# Patient Record
Sex: FEMALE | Race: BLACK OR AFRICAN AMERICAN | NOT HISPANIC OR LATINO | Employment: FULL TIME | URBAN - METROPOLITAN AREA
[De-identification: names, ages, dates, MRNs, and addresses within clinical notes are randomized per-mention and may not be internally consistent; named-entity substitution may affect disease eponyms.]

---

## 2023-02-21 LAB
ALANINE AMINOTRANSFERASE (SGPT) (U/L) IN SER/PLAS: 14 U/L (ref 7–45)
ALBUMIN (G/DL) IN SER/PLAS: 3.9 G/DL (ref 3.4–5)
ALKALINE PHOSPHATASE (U/L) IN SER/PLAS: 39 U/L (ref 33–110)
ANION GAP IN SER/PLAS: 13 MMOL/L (ref 10–20)
ASPARTATE AMINOTRANSFERASE (SGOT) (U/L) IN SER/PLAS: 18 U/L (ref 9–39)
BASOPHILS (10*3/UL) IN BLOOD BY AUTOMATED COUNT: 0.02 X10E9/L (ref 0–0.1)
BASOPHILS/100 LEUKOCYTES IN BLOOD BY AUTOMATED COUNT: 0.3 % (ref 0–2)
BILIRUBIN TOTAL (MG/DL) IN SER/PLAS: 0.5 MG/DL (ref 0–1.2)
CALCIDIOL (25 OH VITAMIN D3) (NG/ML) IN SER/PLAS: 12 NG/ML
CALCIUM (MG/DL) IN SER/PLAS: 9.2 MG/DL (ref 8.6–10.3)
CARBON DIOXIDE, TOTAL (MMOL/L) IN SER/PLAS: 25 MMOL/L (ref 21–32)
CHLORIDE (MMOL/L) IN SER/PLAS: 103 MMOL/L (ref 98–107)
CHOLESTEROL (MG/DL) IN SER/PLAS: 181 MG/DL (ref 0–199)
CHOLESTEROL IN HDL (MG/DL) IN SER/PLAS: 64.8 MG/DL
CHOLESTEROL/HDL RATIO: 2.8
COBALAMIN (VITAMIN B12) (PG/ML) IN SER/PLAS: 275 PG/ML (ref 211–911)
CREATININE (MG/DL) IN SER/PLAS: 0.75 MG/DL (ref 0.5–1.05)
EOSINOPHILS (10*3/UL) IN BLOOD BY AUTOMATED COUNT: 0.02 X10E9/L (ref 0–0.7)
EOSINOPHILS/100 LEUKOCYTES IN BLOOD BY AUTOMATED COUNT: 0.3 % (ref 0–6)
ERYTHROCYTE DISTRIBUTION WIDTH (RATIO) BY AUTOMATED COUNT: 16.9 % (ref 11.5–14.5)
ERYTHROCYTE MEAN CORPUSCULAR HEMOGLOBIN CONCENTRATION (G/DL) BY AUTOMATED: 31.4 G/DL (ref 32–36)
ERYTHROCYTE MEAN CORPUSCULAR VOLUME (FL) BY AUTOMATED COUNT: 82 FL (ref 80–100)
ERYTHROCYTES (10*6/UL) IN BLOOD BY AUTOMATED COUNT: 4.72 X10E12/L (ref 4–5.2)
GFR FEMALE: >90 ML/MIN/1.73M2
GLUCOSE (MG/DL) IN SER/PLAS: 70 MG/DL (ref 74–99)
HEMATOCRIT (%) IN BLOOD BY AUTOMATED COUNT: 38.9 % (ref 36–46)
HEMOGLOBIN (G/DL) IN BLOOD: 12.2 G/DL (ref 12–16)
IMMATURE GRANULOCYTES/100 LEUKOCYTES IN BLOOD BY AUTOMATED COUNT: 0 % (ref 0–0.9)
IRON (UG/DL) IN SER/PLAS: 109 UG/DL (ref 35–150)
IRON BINDING CAPACITY (UG/DL) IN SER/PLAS: 327 UG/DL (ref 240–445)
IRON SATURATION (%) IN SER/PLAS: 33 % (ref 25–45)
LDL: 100 MG/DL (ref 0–99)
LEUKOCYTES (10*3/UL) IN BLOOD BY AUTOMATED COUNT: 5.8 X10E9/L (ref 4.4–11.3)
LYMPHOCYTES (10*3/UL) IN BLOOD BY AUTOMATED COUNT: 1.69 X10E9/L (ref 1.2–4.8)
LYMPHOCYTES/100 LEUKOCYTES IN BLOOD BY AUTOMATED COUNT: 29.1 % (ref 13–44)
MONOCYTES (10*3/UL) IN BLOOD BY AUTOMATED COUNT: 0.39 X10E9/L (ref 0.1–1)
MONOCYTES/100 LEUKOCYTES IN BLOOD BY AUTOMATED COUNT: 6.7 % (ref 2–10)
NEUTROPHILS (10*3/UL) IN BLOOD BY AUTOMATED COUNT: 3.69 X10E9/L (ref 1.2–7.7)
NEUTROPHILS/100 LEUKOCYTES IN BLOOD BY AUTOMATED COUNT: 63.6 % (ref 40–80)
PLATELETS (10*3/UL) IN BLOOD AUTOMATED COUNT: 204 X10E9/L (ref 150–450)
POTASSIUM (MMOL/L) IN SER/PLAS: 3.7 MMOL/L (ref 3.5–5.3)
PROTEIN TOTAL: 7.2 G/DL (ref 6.4–8.2)
SODIUM (MMOL/L) IN SER/PLAS: 137 MMOL/L (ref 136–145)
THYROTROPIN (MIU/L) IN SER/PLAS BY DETECTION LIMIT <= 0.05 MIU/L: 1.01 MIU/L (ref 0.44–3.98)
TRIGLYCERIDE (MG/DL) IN SER/PLAS: 83 MG/DL (ref 0–149)
UREA NITROGEN (MG/DL) IN SER/PLAS: 9 MG/DL (ref 6–23)
VLDL: 17 MG/DL (ref 0–40)

## 2023-02-22 LAB — FERRITIN (UG/LL) IN SER/PLAS: 118 UG/L (ref 8–150)

## 2023-05-05 ENCOUNTER — HOSPITAL ENCOUNTER (OUTPATIENT)
Dept: DATA CONVERSION | Facility: HOSPITAL | Age: 43
End: 2023-05-05
Attending: ANESTHESIOLOGY | Admitting: ANESTHESIOLOGY
Payer: COMMERCIAL

## 2023-05-05 DIAGNOSIS — M25.551 PAIN IN RIGHT HIP: ICD-10-CM

## 2023-05-05 DIAGNOSIS — M16.11 UNILATERAL PRIMARY OSTEOARTHRITIS, RIGHT HIP: ICD-10-CM

## 2023-05-22 DIAGNOSIS — Z00.00 ROUTINE GENERAL MEDICAL EXAMINATION AT A HEALTH CARE FACILITY: Primary | ICD-10-CM

## 2023-05-23 RX ORDER — MELOXICAM 7.5 MG/1
TABLET ORAL
Qty: 60 TABLET | Refills: 3 | Status: SHIPPED | OUTPATIENT
Start: 2023-05-23 | End: 2023-11-07 | Stop reason: WASHOUT

## 2023-06-13 ENCOUNTER — HOSPITAL ENCOUNTER (OUTPATIENT)
Dept: DATA CONVERSION | Facility: HOSPITAL | Age: 43
End: 2023-06-13
Attending: ANESTHESIOLOGY

## 2023-06-15 ENCOUNTER — TELEPHONE (OUTPATIENT)
Dept: PRIMARY CARE | Facility: CLINIC | Age: 43
End: 2023-06-15
Payer: COMMERCIAL

## 2023-06-15 NOTE — TELEPHONE ENCOUNTER
Shoneia has been on an antibiotic for sinus infection.  She now has a yeast infection and is requesting that something be sent in for that.  She would also like a cream or gel as well.  Thank you

## 2023-06-19 DIAGNOSIS — B37.9 CANDIDIASIS: Primary | ICD-10-CM

## 2023-06-19 RX ORDER — FLUCONAZOLE 150 MG/1
150 TABLET ORAL ONCE
Qty: 1 TABLET | Refills: 0 | Status: SHIPPED | OUTPATIENT
Start: 2023-06-19 | End: 2023-12-27

## 2023-07-11 ENCOUNTER — HOSPITAL ENCOUNTER (OUTPATIENT)
Dept: DATA CONVERSION | Facility: HOSPITAL | Age: 43
End: 2023-07-11
Attending: ANESTHESIOLOGY
Payer: COMMERCIAL

## 2023-07-11 DIAGNOSIS — M25.551 PAIN IN RIGHT HIP: ICD-10-CM

## 2023-07-11 DIAGNOSIS — M16.11 UNILATERAL PRIMARY OSTEOARTHRITIS, RIGHT HIP: ICD-10-CM

## 2023-07-20 ENCOUNTER — TELEPHONE (OUTPATIENT)
Dept: PRIMARY CARE | Facility: CLINIC | Age: 43
End: 2023-07-20
Payer: COMMERCIAL

## 2023-07-20 DIAGNOSIS — Z12.31 ENCOUNTER FOR SCREENING MAMMOGRAM FOR MALIGNANT NEOPLASM OF BREAST: ICD-10-CM

## 2023-07-20 DIAGNOSIS — R92.8 ABNORMAL MAMMOGRAM OF RIGHT BREAST: Primary | ICD-10-CM

## 2023-07-20 NOTE — TELEPHONE ENCOUNTER
Shoneia is requesting an order for a mammogram & ultrasound.  She thought that [facility] had already ordered these, but I don't see anything.

## 2023-07-27 ENCOUNTER — APPOINTMENT (OUTPATIENT)
Dept: PRIMARY CARE | Facility: CLINIC | Age: 43
End: 2023-07-27
Payer: COMMERCIAL

## 2023-07-28 ENCOUNTER — APPOINTMENT (OUTPATIENT)
Dept: PRIMARY CARE | Facility: CLINIC | Age: 43
End: 2023-07-28
Payer: COMMERCIAL

## 2023-08-02 ENCOUNTER — OFFICE VISIT (OUTPATIENT)
Dept: PRIMARY CARE | Facility: CLINIC | Age: 43
End: 2023-08-02
Payer: COMMERCIAL

## 2023-08-02 VITALS
WEIGHT: 293 LBS | RESPIRATION RATE: 18 BRPM | OXYGEN SATURATION: 98 % | DIASTOLIC BLOOD PRESSURE: 60 MMHG | HEIGHT: 67 IN | SYSTOLIC BLOOD PRESSURE: 110 MMHG | BODY MASS INDEX: 45.99 KG/M2 | HEART RATE: 66 BPM | TEMPERATURE: 98.4 F

## 2023-08-02 DIAGNOSIS — M16.11 PRIMARY OSTEOARTHRITIS OF RIGHT HIP: Primary | ICD-10-CM

## 2023-08-02 DIAGNOSIS — Z90.710 S/P TAH (TOTAL ABDOMINAL HYSTERECTOMY): ICD-10-CM

## 2023-08-02 DIAGNOSIS — R10.84 GENERALIZED ABDOMINAL PAIN: ICD-10-CM

## 2023-08-02 DIAGNOSIS — R10.32 LEFT LOWER QUADRANT ABDOMINAL PAIN: ICD-10-CM

## 2023-08-02 DIAGNOSIS — E11.9 TYPE 2 DIABETES MELLITUS WITHOUT COMPLICATION, WITHOUT LONG-TERM CURRENT USE OF INSULIN (MULTI): ICD-10-CM

## 2023-08-02 DIAGNOSIS — E55.9 VITAMIN D DEFICIENCY: ICD-10-CM

## 2023-08-02 PROBLEM — M19.90 ARTHRITIS: Status: ACTIVE | Noted: 2023-08-02

## 2023-08-02 PROBLEM — R43.0 ANOSMIA: Status: ACTIVE | Noted: 2023-08-02

## 2023-08-02 PROBLEM — K21.9 GERD (GASTROESOPHAGEAL REFLUX DISEASE): Status: ACTIVE | Noted: 2023-08-02

## 2023-08-02 PROBLEM — I83.893 VARICOSE VEINS OF LEG WITH EDEMA, BILATERAL: Status: ACTIVE | Noted: 2023-08-02

## 2023-08-02 PROBLEM — I80.9 THROMBOPHLEBITIS: Status: ACTIVE | Noted: 2023-08-02

## 2023-08-02 PROBLEM — R53.83 FATIGUE: Status: ACTIVE | Noted: 2023-08-02

## 2023-08-02 PROBLEM — E53.8 VITAMIN B12 DEFICIENCY: Status: ACTIVE | Noted: 2023-08-02

## 2023-08-02 PROBLEM — R06.83 SNORING: Status: ACTIVE | Noted: 2023-08-02

## 2023-08-02 PROBLEM — B37.31 CANDIDAL VULVOVAGINITIS: Status: ACTIVE | Noted: 2023-08-02

## 2023-08-02 PROBLEM — R63.2 POLYPHAGIA: Status: ACTIVE | Noted: 2023-08-02

## 2023-08-02 PROBLEM — L73.2 HIDRADENITIS SUPPURATIVA: Status: ACTIVE | Noted: 2023-08-02

## 2023-08-02 PROBLEM — I10 BENIGN HYPERTENSION: Status: ACTIVE | Noted: 2018-10-30

## 2023-08-02 PROBLEM — E66.01 MORBID OBESITY (MULTI): Status: ACTIVE | Noted: 2023-08-02

## 2023-08-02 PROBLEM — R59.0 CERVICAL LYMPHADENOPATHY: Status: ACTIVE | Noted: 2023-08-02

## 2023-08-02 PROBLEM — G47.30 SLEEP APNEA: Status: ACTIVE | Noted: 2023-08-02

## 2023-08-02 PROBLEM — D64.9 ANEMIA: Status: ACTIVE | Noted: 2023-08-02

## 2023-08-02 PROBLEM — N97.0 ANOVULAR MENSTRUATION: Status: ACTIVE | Noted: 2023-08-02

## 2023-08-02 PROBLEM — N63.10 MASS OF BREAST, RIGHT: Status: ACTIVE | Noted: 2022-08-16

## 2023-08-02 PROBLEM — N92.0 MENORRHAGIA: Status: ACTIVE | Noted: 2023-08-02

## 2023-08-02 PROBLEM — N94.6 DYSMENORRHEA: Status: ACTIVE | Noted: 2023-08-02

## 2023-08-02 PROBLEM — B35.4 TINEA CORPORIS: Status: ACTIVE | Noted: 2023-08-02

## 2023-08-02 LAB
POC APPEARANCE, URINE: ABNORMAL
POC BILIRUBIN, URINE: ABNORMAL
POC BLOOD, URINE: NEGATIVE
POC COLOR, URINE: ABNORMAL
POC GLUCOSE, URINE: NEGATIVE MG/DL
POC KETONES, URINE: ABNORMAL MG/DL
POC LEUKOCYTES, URINE: NEGATIVE
POC NITRITE,URINE: NEGATIVE
POC PH, URINE: 6 PH
POC PROTEIN, URINE: ABNORMAL MG/DL
POC SPECIFIC GRAVITY, URINE: >=1.03
POC UROBILINOGEN, URINE: 0.2 EU/DL

## 2023-08-02 PROCEDURE — 3008F BODY MASS INDEX DOCD: CPT | Performed by: INTERNAL MEDICINE

## 2023-08-02 PROCEDURE — 87086 URINE CULTURE/COLONY COUNT: CPT

## 2023-08-02 PROCEDURE — 81002 URINALYSIS NONAUTO W/O SCOPE: CPT | Performed by: INTERNAL MEDICINE

## 2023-08-02 PROCEDURE — 3074F SYST BP LT 130 MM HG: CPT | Performed by: INTERNAL MEDICINE

## 2023-08-02 PROCEDURE — 1036F TOBACCO NON-USER: CPT | Performed by: INTERNAL MEDICINE

## 2023-08-02 PROCEDURE — 3078F DIAST BP <80 MM HG: CPT | Performed by: INTERNAL MEDICINE

## 2023-08-02 PROCEDURE — 99215 OFFICE O/P EST HI 40 MIN: CPT | Performed by: INTERNAL MEDICINE

## 2023-08-02 RX ORDER — FLUTICASONE PROPIONATE 50 MCG
SPRAY, SUSPENSION (ML) NASAL AS NEEDED
COMMUNITY
Start: 2022-06-30

## 2023-08-02 RX ORDER — CLINDAMYCIN PHOSPHATE 10 UG/ML
LOTION TOPICAL
COMMUNITY
Start: 2022-07-06 | End: 2023-08-02 | Stop reason: ALTCHOICE

## 2023-08-02 RX ORDER — UBIDECARENONE 75 MG
1 CAPSULE ORAL DAILY
COMMUNITY
Start: 2023-02-22 | End: 2024-04-09 | Stop reason: SDUPTHER

## 2023-08-02 RX ORDER — TIZANIDINE HYDROCHLORIDE 4 MG/1
4 CAPSULE, GELATIN COATED ORAL 3 TIMES DAILY PRN
Qty: 30 CAPSULE | Refills: 3 | Status: SHIPPED | OUTPATIENT
Start: 2023-08-02 | End: 2023-08-10

## 2023-08-02 RX ORDER — FLUCONAZOLE 150 MG/1
TABLET ORAL
COMMUNITY
Start: 2023-06-19 | End: 2023-08-02 | Stop reason: ALTCHOICE

## 2023-08-02 RX ORDER — KETOCONAZOLE 20 MG/G
CREAM TOPICAL 2 TIMES DAILY
COMMUNITY
Start: 2022-09-19 | End: 2023-08-02 | Stop reason: ALTCHOICE

## 2023-08-02 RX ORDER — DULOXETIN HYDROCHLORIDE 30 MG/1
CAPSULE, DELAYED RELEASE ORAL
COMMUNITY
Start: 2023-04-24 | End: 2023-08-02 | Stop reason: SDDI

## 2023-08-02 RX ORDER — DULOXETIN HYDROCHLORIDE 60 MG/1
CAPSULE, DELAYED RELEASE ORAL
COMMUNITY
Start: 2023-04-24 | End: 2023-08-02 | Stop reason: SDDI

## 2023-08-02 RX ORDER — FOLIC ACID 1 MG/1
TABLET ORAL
COMMUNITY
Start: 2022-06-23 | End: 2024-04-09 | Stop reason: SDUPTHER

## 2023-08-02 RX ORDER — FERROUS SULFATE 325(65) MG
1 TABLET ORAL DAILY
COMMUNITY
Start: 2021-10-25 | End: 2023-08-02 | Stop reason: SDUPTHER

## 2023-08-02 RX ORDER — TIZANIDINE HYDROCHLORIDE 4 MG/1
CAPSULE, GELATIN COATED ORAL
COMMUNITY
Start: 2022-04-11 | End: 2023-08-02 | Stop reason: SDUPTHER

## 2023-08-02 RX ORDER — LORATADINE 10 MG
1 TABLET,DISINTEGRATING ORAL DAILY
COMMUNITY
Start: 2023-06-11

## 2023-08-02 RX ORDER — FERROUS SULFATE 325(65) MG
1 TABLET ORAL DAILY
Qty: 90 TABLET | Refills: 3 | Status: SHIPPED | OUTPATIENT
Start: 2023-08-02 | End: 2024-04-09 | Stop reason: SDUPTHER

## 2023-08-02 RX ORDER — ERGOCALCIFEROL 1.25 MG/1
CAPSULE ORAL
COMMUNITY
Start: 2021-07-28 | End: 2023-08-02 | Stop reason: ALTCHOICE

## 2023-08-02 ASSESSMENT — PAIN SCALES - GENERAL: PAINLEVEL: 10-WORST PAIN EVER

## 2023-08-02 NOTE — LETTER
August 2, 2023     Patient: Shoneia M Collins   YOB: 1980   Date of Visit: 8/2/2023       To Whom It May Concern:    Shoneia Collins was seen in my clinic on 8/2/2023 at 2:40 pm. Please excuse Shoneia for her absence from work on this day to make the appointment and until 8/5/2023.    If you have any questions or concerns, please don't hesitate to call.         Sincerely,         Goran Triplett,         CC: No Recipients

## 2023-08-02 NOTE — PROGRESS NOTES
"Subjective   Shoneia M Collins is a 43 y.o. female who presents for Follow-up (Medications ), Establish Care (Previous pcp was [facility] ), Constipation, and Abdominal Pain.      Appointment made to get tizanidine refilled.  Since Friday has had trouble with eating and sleeping.  All day Saturday laid in bed.  Urinating frequently and has had incontinence when standing up.  No burning with urination.      Sore to the touch and feels like someone is pushing and jabbing there.   Relief with pressure to the LLQ.  Has had this 2 other times before and thought it was associated with vaginal intercourse.  Has had anal sex but not associated with it this time.  Did not do the enema.      Patient states that she hasn't been able to eat, only that she can keep down is fruit, water and tea anything else she will throw up   Gets a little relief when she is able to make a bowel movement, but her bowel movements are very small, states that its like a \" pebble\" that comes out  Patient states that this has happened before and she has always associated it to intercourse but this time it has lasted longer then previous times   Patient has no gallbladder   Patient states that her lower abdomen is really hard and feels like \" someone is twisting her insides\"   Feeling very weak and fatigue- has been taking her muscle relaxer's to help her sleep through the pain     Was recently treated for bacterial vaginosis and a recent sinusitis.  Amoxil given.        Abdominal Pain  This is a new problem. The current episode started in the past 7 days. The onset quality is sudden. The problem occurs constantly. The problem has been gradually worsening. The pain is located in the generalized abdominal region. The pain is at a severity of 10/10. The pain is moderate. The quality of the pain is sharp. The pain is aggravated by eating and vomiting. The pain is relieved by Bowel movements. She has tried nothing for the symptoms. The treatment " "provided no relief.   Fatigue  This is a new problem. The current episode started in the past 7 days. The problem occurs constantly. The problem has been unchanged.       Review of Systems   All other systems reviewed and are negative.      Objective   /60   Pulse 66   Temp 36.9 °C (98.4 °F)   Resp 18   Ht 1.702 m (5' 7\")   Wt (!) 151 kg (332 lb 12.8 oz)   LMP 10/07/2021   SpO2 98%   BMI 52.12 kg/m²       Physical Exam    Assessment/Plan   Problem List Items Addressed This Visit       Diabetes mellitus (CMS/[facility])    Osteoarthritis of right hip - Primary    Relevant Medications    ferrous sulfate 325 (65 Fe) MG tablet    tiZANidine (Zanaflex) 4 mg capsule    Vitamin D deficiency    Relevant Orders    Vitamin D 25-Hydroxy,Total    S/P GAVIN (total abdominal hysterectomy)    Relevant Medications    ferrous sulfate 325 (65 Fe) MG tablet    tiZANidine (Zanaflex) 4 mg capsule    Other Relevant Orders    CBC and Auto Differential    Vitamin D 25-Hydroxy,Total    CT abdomen pelvis w IV contrast    Comprehensive Metabolic Panel    Left lower quadrant abdominal pain    Relevant Medications    ferrous sulfate 325 (65 Fe) MG tablet    tiZANidine (Zanaflex) 4 mg capsule    Other Relevant Orders    CBC and Auto Differential    Vitamin D 25-Hydroxy,Total    CT abdomen pelvis w IV contrast    Comprehensive Metabolic Panel     Other Visit Diagnoses       Generalized abdominal pain        Relevant Medications    ferrous sulfate 325 (65 Fe) MG tablet    tiZANidine (Zanaflex) 4 mg capsule    Other Relevant Orders    POCT UA (nonautomated) manually resulted (Completed)    Urine Culture          Encounter Diagnoses   Name Primary?    Generalized abdominal pain     Primary osteoarthritis of right hip Yes    Left lower quadrant abdominal pain     S/P GAIVN (total abdominal hysterectomy)     Vitamin D deficiency     Type 2 diabetes mellitus without complication, without long-term current use of insulin (CMS/[facility])      Patient with " LLQ ain and discomfort and constipation.  She has had some chils but is afebrile at this time.  Suspect Diverticulitis and will order labs and CT abdomen STAT and she will call after CT for further orders.    Follow up in 24 to 48 hours.      Goran Triplett DO

## 2023-08-03 LAB — URINE CULTURE: NORMAL

## 2023-08-04 ENCOUNTER — TELEPHONE (OUTPATIENT)
Dept: PRIMARY CARE | Facility: CLINIC | Age: 43
End: 2023-08-04
Payer: COMMERCIAL

## 2023-08-04 DIAGNOSIS — K57.33 DIVERTICULITIS OF LARGE INTESTINE WITHOUT PERFORATION OR ABSCESS WITH BLEEDING: Primary | ICD-10-CM

## 2023-08-04 RX ORDER — AMOXICILLIN AND CLAVULANATE POTASSIUM 875; 125 MG/1; MG/1
1 TABLET, FILM COATED ORAL 2 TIMES DAILY
Qty: 14 TABLET | Refills: 0 | Status: SHIPPED | OUTPATIENT
Start: 2023-08-04 | End: 2023-08-11

## 2023-08-04 NOTE — TELEPHONE ENCOUNTER
Shoneia called and would like someone to call her and review the results of her stat labs and CT with her.

## 2023-08-10 ENCOUNTER — TELEPHONE (OUTPATIENT)
Dept: PRIMARY CARE | Facility: CLINIC | Age: 43
End: 2023-08-10
Payer: COMMERCIAL

## 2023-08-10 DIAGNOSIS — M62.838 MUSCLE SPASM: ICD-10-CM

## 2023-08-10 NOTE — TELEPHONE ENCOUNTER
Patient has been having issues getting her tizanidine covered by insurance. The pharmacy suggested switching it from capsules to tablets and it's likely the insurance would cover it that way. So she would like to know if that could be done. ALSO she wants to know if a nutritionist referral can be put in for her.

## 2023-08-15 DIAGNOSIS — M62.838 MUSCLE SPASM: ICD-10-CM

## 2023-08-15 RX ORDER — TIZANIDINE 4 MG/1
TABLET ORAL
Qty: 30 TABLET | Refills: 3 | Status: SHIPPED | OUTPATIENT
Start: 2023-08-15 | End: 2023-11-14 | Stop reason: WASHOUT

## 2023-08-15 RX ORDER — TIZANIDINE 4 MG/1
TABLET ORAL
Qty: 90 TABLET | Refills: 0 | OUTPATIENT
Start: 2023-08-15

## 2023-08-22 PROBLEM — R73.9 HYPERGLYCEMIA: Status: ACTIVE | Noted: 2023-08-22

## 2023-08-22 PROBLEM — M75.52 SUBACROMIAL BURSITIS OF LEFT SHOULDER JOINT: Status: ACTIVE | Noted: 2023-08-22

## 2023-08-22 PROBLEM — J35.1 LINGUAL TONSIL HYPERTROPHY: Status: ACTIVE | Noted: 2023-08-22

## 2023-08-22 PROBLEM — M25.512 CHRONIC LEFT SHOULDER PAIN: Status: ACTIVE | Noted: 2023-08-22

## 2023-08-22 PROBLEM — M54.9 BACK PAIN: Status: ACTIVE | Noted: 2023-08-22

## 2023-08-22 PROBLEM — R43.9 SMELL AND TASTE DISORDER: Status: ACTIVE | Noted: 2023-08-22

## 2023-08-22 PROBLEM — I83.892 HEMORRHAGE OF VARICOSE VEINS OF LEFT LOWER EXTREMITY: Status: ACTIVE | Noted: 2023-08-22

## 2023-08-22 PROBLEM — I83.819 VARICOSE VEINS WITH PAIN: Status: ACTIVE | Noted: 2023-08-22

## 2023-08-22 PROBLEM — G89.29 CHRONIC LEFT SHOULDER PAIN: Status: ACTIVE | Noted: 2023-08-22

## 2023-08-22 RX ORDER — CALCIUM CARBONATE 200(500)MG
1 TABLET,CHEWABLE ORAL DAILY
COMMUNITY
End: 2023-11-07 | Stop reason: WASHOUT

## 2023-08-22 RX ORDER — DULOXETIN HYDROCHLORIDE 30 MG/1
30 CAPSULE, DELAYED RELEASE ORAL
COMMUNITY
End: 2023-11-07 | Stop reason: WASHOUT

## 2023-08-22 RX ORDER — CLINDAMYCIN PHOSPHATE 10 UG/ML
LOTION TOPICAL AS NEEDED
COMMUNITY

## 2023-08-22 RX ORDER — DULOXETIN HYDROCHLORIDE 60 MG/1
60 CAPSULE, DELAYED RELEASE ORAL EVERY EVENING
COMMUNITY
End: 2024-04-09 | Stop reason: ALTCHOICE

## 2023-08-22 RX ORDER — KETOCONAZOLE 20 MG/G
CREAM TOPICAL AS NEEDED
COMMUNITY

## 2023-08-22 RX ORDER — CHLORHEXIDINE GLUCONATE 40 MG/ML
SOLUTION TOPICAL
COMMUNITY
End: 2023-12-11 | Stop reason: ALTCHOICE

## 2023-08-22 RX ORDER — ACETAMINOPHEN 500 MG
500 TABLET ORAL EVERY 8 HOURS PRN
COMMUNITY
End: 2024-04-09

## 2023-10-02 ENCOUNTER — APPOINTMENT (OUTPATIENT)
Dept: SURGERY | Facility: CLINIC | Age: 43
End: 2023-10-02
Payer: COMMERCIAL

## 2023-10-02 NOTE — OP NOTE
Post Operative Note:     PreOp Diagnosis: Right hip osteoarthritis   Post-Procedure Diagnosis: Right hip osteoarthritis   Procedure: 1.  Right hip intra-articular corticosteroid  injection under fluoroscopic guidance   2.  moderate sedation   Surgeon: Jami Chinchilla MD PhD   Resident/Fellow/Other Assistant: Yehuda Castillo DO   Estimated Blood Loss (mL): none   Specimen: no   Findings: None     Operative Report Dictated:  Dictation: not applicable - note contains Operative  Report   Operative Report:    Patient is a pleasant 43-year-old female who has severe right hip osteoarthritis, she is currently awaiting total joint replacement when she is medically optimized  and added healthy weight to have the surgery performed.  In the interim we have offered intra-articular corticosteroid injection under fluoroscopic guidance as well as possible consideration of referral and obturator nerve block if we do not see significant  lasting efficacy from this injection.    After informed consent was obtained, the patient was brought to the operating   room and placed in the left lateral decubitus position (procedure side up).  The skin and subcutaneous tissue overlying needle trajectory was anesthetized with 0.5% lidocaine under fluoroscopic guidance.  A 23-gauge spinal needle was then introduced into  the hip joint.  Injection of contrast revealed appropriate spread.  Subsequently 3mL of 0.5% ropivacaine and 40 mg methylprednisolone were injected.  The patient was then transferred to the recovery room in stable condition.       FOLLOW UP:  The patient will update us on their response to this procedure, and agrees to continue currently prescribed/recommended therapies       Attestation:   Note Completion:  I am a: Resident/Fellow   Attending Attestation I was present for the entire procedure          Electronic Signatures:  Jami Chinchilla)  (Signed 05-May-2023 12:07)   Authored: Post Operative Note, Note  Completion   Co-Signer: Post Operative Note, Note Completion  Yehuda Castillo (DO (Resident))  (Signed 05-May-2023 12:02)   Authored: Post Operative Note, Note Completion      Last Updated: 05-May-2023 12:07 by Jami Chinchilla)

## 2023-10-02 NOTE — OP NOTE
Post Operative Note:     PreOp Diagnosis: right hip osteoarthritis   Post-Procedure Diagnosis: right hip osteoarthritis   Procedure: 1. RIGHT HIP INTRAARTICULAR JOINT INJECTION  2. Fluoroscopic guidance   Surgeon: Jami Chinchilla MD PHD   Resident/Fellow/Other Assistant: Jeff Vences DO   Estimated Blood Loss (mL): none   Specimen: no   Findings: none     Operative Report Dictated:  Dictation: not applicable - note contains Operative  Report   Operative Report:    Ms. Shoeneia Collins is a 43-year-old female with past medical history of osteoarthritis of the hips who was seen in the pain clinic recently.  She presents for a  right-sided intra-articular hip injection.  Should she not receive pain relief with this she may benefit from a femoral and obturator nerve block of the right hip.    After informed consent was obtained, patient was brought to the operating room and placed in the left lateral position with the right side superior. a time out procedure was performed. The right hip area was prepped and draped with chlorhexidine in the  usual sterile fashion.    The appropriate entry point on the right hip was identified with fluoroscopic guidance.  Following this the skin and subcutaneous tissue was anesthetized with 2 mL of 0.5% lidocaine.  A 22-gauge Chiba needle was then inserted and a coaxial technique and  a lateral view to the desired area.  Following this confirmation of placement was confirmed with 1 mL of radiopaque dye in a AP view.  3 mL of half percent ropivacaine and 1 mL (40 mg) methylprednisolone was injected after negative aspiration.  At the  end of the procedure, the needles were removed.  Patient was transferred to recovery room in stable condition.     Given the patient's relatively young age, she would benefit from diagnostic injections of the sensory branches of the femoral and obturator nerves to the right hip and if she obtains adequate relief with the diagnostic injections, she may be  scheduled  for radiofrequency neurotomy in the future as these procedures are steroid free.      Attestation:   Note Completion:  I am a: Resident/Fellow   Attending Attestation I was present for the entire procedure          Electronic Signatures:  Jeff Vences ( (Resident))  (Signed 11-Jul-2023 15:41)   Authored: Post Operative Note, Note Completion  Jami Chinchilla)  (Signed 11-Jul-2023 15:44)   Authored: Post Operative Note, Note Completion   Co-Signer: Post Operative Note, Note Completion      Last Updated: 11-Jul-2023 15:44 by Jami Chinchilla)

## 2023-10-06 ENCOUNTER — OFFICE VISIT (OUTPATIENT)
Dept: SURGERY | Facility: HOSPITAL | Age: 43
End: 2023-10-06
Payer: COMMERCIAL

## 2023-10-06 VITALS
TEMPERATURE: 96.7 F | BODY MASS INDEX: 45.99 KG/M2 | WEIGHT: 293 LBS | HEIGHT: 67 IN | DIASTOLIC BLOOD PRESSURE: 85 MMHG | SYSTOLIC BLOOD PRESSURE: 141 MMHG | RESPIRATION RATE: 18 BRPM | HEART RATE: 64 BPM

## 2023-10-06 DIAGNOSIS — E63.9 INADEQUATE DIETARY CALORIC INTAKE: ICD-10-CM

## 2023-10-06 DIAGNOSIS — E11.9 TYPE 2 DIABETES MELLITUS WITHOUT COMPLICATION, WITHOUT LONG-TERM CURRENT USE OF INSULIN (MULTI): ICD-10-CM

## 2023-10-06 DIAGNOSIS — Z98.84 BARIATRIC SURGERY STATUS: ICD-10-CM

## 2023-10-06 DIAGNOSIS — E66.01 CLASS 3 SEVERE OBESITY DUE TO EXCESS CALORIES WITH SERIOUS COMORBIDITY AND BODY MASS INDEX (BMI) OF 50.0 TO 59.9 IN ADULT (MULTI): Primary | ICD-10-CM

## 2023-10-06 DIAGNOSIS — E63.9 INADEQUATE CALORIC INTAKE: ICD-10-CM

## 2023-10-06 PROCEDURE — 3008F BODY MASS INDEX DOCD: CPT

## 2023-10-06 PROCEDURE — 99204 OFFICE O/P NEW MOD 45 MIN: CPT

## 2023-10-06 PROCEDURE — 3079F DIAST BP 80-89 MM HG: CPT

## 2023-10-06 PROCEDURE — 3077F SYST BP >= 140 MM HG: CPT

## 2023-10-06 PROCEDURE — 99214 OFFICE O/P EST MOD 30 MIN: CPT

## 2023-10-06 PROCEDURE — 1036F TOBACCO NON-USER: CPT

## 2023-10-06 SDOH — ECONOMIC STABILITY: FOOD INSECURITY: WITHIN THE PAST 12 MONTHS, THE FOOD YOU BOUGHT JUST DIDN'T LAST AND YOU DIDN'T HAVE MONEY TO GET MORE.: NEVER TRUE

## 2023-10-06 SDOH — ECONOMIC STABILITY: FOOD INSECURITY: WITHIN THE PAST 12 MONTHS, YOU WORRIED THAT YOUR FOOD WOULD RUN OUT BEFORE YOU GOT MONEY TO BUY MORE.: NEVER TRUE

## 2023-10-06 ASSESSMENT — LIFESTYLE VARIABLES
AUDIT-C TOTAL SCORE: 6
HOW OFTEN DO YOU HAVE SIX OR MORE DRINKS ON ONE OCCASION: MONTHLY
HOW MANY STANDARD DRINKS CONTAINING ALCOHOL DO YOU HAVE ON A TYPICAL DAY: 1 OR 2
SKIP TO QUESTIONS 9-10: 0
HOW OFTEN DO YOU HAVE A DRINK CONTAINING ALCOHOL: 4 OR MORE TIMES A WEEK

## 2023-10-06 ASSESSMENT — SOCIAL DETERMINANTS OF HEALTH (SDOH)

## 2023-10-06 ASSESSMENT — ENCOUNTER SYMPTOMS
ARTHRALGIAS: 1
LOSS OF SENSATION IN FEET: 0
OCCASIONAL FEELINGS OF UNSTEADINESS: 1
DEPRESSION: 0

## 2023-10-06 ASSESSMENT — PATIENT HEALTH QUESTIONNAIRE - PHQ9
SUM OF ALL RESPONSES TO PHQ9 QUESTIONS 1 & 2: 0
2. FEELING DOWN, DEPRESSED OR HOPELESS: NOT AT ALL
1. LITTLE INTEREST OR PLEASURE IN DOING THINGS: NOT AT ALL

## 2023-10-06 ASSESSMENT — PAIN SCALES - GENERAL: PAINLEVEL: 8

## 2023-10-06 NOTE — PATIENT INSTRUCTIONS
"It was great to meet with you today!    Next steps include:    Please go to: [email or web address] - click on the link \"get started\" - watch the video and complete the patient information form on the website.    Once our navigators have completed your insurance verification, they will reach out to you directly to schedule your initial appointments with one of our bariatric surgeons and bariatric dietitians.    Per insurance guidelines all patients will need:   1. Copy of Sleep Study &/or a CPAP Compliance Report.  2. Cardiology Clearance; referral placed if needed.  3. Psychiatric Evaluation/Clearance; referral placed if needed.  4. Initial Bariatric Lab Work; requisition given today.  You will be contacted regarding abnormal results, results available to view on  Portal.   Other clearances may be needed depending on your specific medical conditions and history.     I have placed orders for your initial screening lab work which can be completed at any outpatient  lab facility.    I have also placed a sleep medicine evaluation which can be scheduled by calling [phone].    Here are some additional tips to help you prepare and begin to implement some of the recommended diet and exercise modifications to help you with successful weight loss long term:     Keep a food journal and log everything you eat and drink. You can use a phone applications like \"MyfitnessPal,\" \"Lose it\", \"Baritastic\" or a notebook.   Eat between 0735-3056 calories per day.    Consume less than 100 carbohydrates per day. Examples of carbohydrates include: bread, pasta, cakes, cookies, rice, cereal, fruit drinks, juice, soda and fruit.  This typically means keeping portion sizes of starchy foods as listed above to less than ½ cup serving size with meals and choosing snacks that are less than 15g of carbohydrate per serving.  If you use one of the suggested apps above, you can keep track of this by weighing/measuring portion sizes " to help keep within this goal.    Eat 3 meals and 1 snack. Each meal should have 3-4 oz of protein and vegetables. Limit starches.  Eat on a schedule and do not skip meals.  Eat more vegetables throughout your day; aim for 5+ servings daily.  When you want something sweet, have fruit but no more than 1 serving per day.   Eliminate any pop, juice or added sugar beverages, if you haven't already, from your diet.  Start taking a multivitamin daily- Try Flintstones complete.    Exercise Recommendations:   Aim for 30 minutes per day, 5 days per week to start, with progression over several weeks to more vigorous intensity.   Emphasize increasing duration, rather than intensity initially.   Moderate-intensity physical activity includes:  - Brisk Walking  - Biking (slower than 10 MPH)  - Water Aerobics  - Vigorous housework (washing windows, vacuuming, mopping)  - Mowing the lawn (push mower)  - Gardening      Take care!  Mery Aguliar, MSN, APRN, AGNP-C

## 2023-10-06 NOTE — H&P
History Of Present Illness  Alicja Trimble is a 43 y.o. female presenting with CWM.    Initial Weight: 341 lbs  Initial BMI:  Highest Weight: 474 lbs. Alicja has lost > 130 on her own within 1 year by adjusting her diet   Lowest weight:      Past Medical History  Past Medical History:   Diagnosis Date    Gestational diabetes mellitus in pregnancy, unspecified control 2017    Gestational diabetes mellitus (GDM) in second trimester, gestational diabetes method of control unspecified    Hypertrophy of tonsils with hypertrophy of adenoids 2014    Tonsillar and adenoid hypertrophy    Personal history of gestational diabetes 2017    History of gestational diabetes mellitus (GDM)    Snoring 11/10/2014    Snoring       Surgical History  Past Surgical History:   Procedure Laterality Date     SECTION, CLASSIC  11/10/2014     Section    GALLBLADDER SURGERY  11/10/2014    Gallbladder Surgery    OTHER SURGICAL HISTORY  2021    Laparoscopic hysterectomy    OTHER SURGICAL HISTORY  2021    Bilateral salpingectomy    OTHER SURGICAL HISTORY  2021    Cystoscopy    TUBAL LIGATION  2017    Tubal Ligation        Social History  She reports that she quit smoking about 20 years ago. Her smoking use included cigarettes. She has never used smokeless tobacco. She reports current alcohol use. She reports current drug use. Drug: Marijuana.    Family History  Family History   Problem Relation Name Age of Onset    Lung disease Mother      Diabetes Paternal Grandmother      Diabetes Other          Allergies  Pollen extracts      Review of Systems   Musculoskeletal:  Positive for arthralgias (left hip).          Physical Exam  General- No acute distress, well appearing and well nourished. Obese  HEENT - no erythema, swelling or discharge. Neck supple, no cervical lymphadenopathy.   Pulmonary - respiratory effort normal, lungs CTAB.   Cardiovascular - HRR, no m/r/g  Extremities - no edema  "and/or varicosities, no peripheral edema  Abdomen - Soft, Non-tender, non-distended, no abdominal masses.   Musculoskeletal - Range of motion WNL  Skin - normal without rashes or lesions.  Neurologic - reflexes intact, coordination WNL, gait WNL  Psychiatric - AXO x3, mood and affect appropriate       Last Recorded Vitals  Blood pressure 141/85, pulse 64, temperature 35.9 °C (96.7 °F), resp. rate 18, height 1.702 m (5' 7\"), weight (!) 155 kg (341 lb 1.6 oz), last menstrual period 10/07/2021.    Relevant Results          Current Outpatient Medications:     acetaminophen (Tylenol) 500 mg tablet, Take 1 tablet (500 mg) by mouth every 6 hours if needed for mild pain (1 - 3)., Disp: , Rfl:     calcium carbonate (Tums) 200 mg calcium chewable tablet, Chew 1 tablet (500 mg) once daily., Disp: , Rfl:     chlorhexidine (Hibiclens) 4 % external liquid, Apply topically., Disp: , Rfl:     clindamycin (Cleocin T) 1 % lotion, Apply topically., Disp: , Rfl:     cyanocobalamin (Vitamin B-12) 500 mcg tablet, Take 1 tablet (500 mcg) by mouth once daily., Disp: , Rfl:     ferrous sulfate 325 (65 Fe) MG tablet, Take 1 tablet (325 mg) by mouth once daily., Disp: 90 tablet, Rfl: 3    fluticasone (Flonase) 50 mcg/actuation nasal spray, Administer into affected nostril(s)., Disp: , Rfl:     folic acid (Folvite) 1 mg tablet, Take by mouth., Disp: , Rfl:     ketoconazole (NIZOral) 2 % cream, Apply topically 2 times a day., Disp: , Rfl:     loratadine (Claritin Reditabs) 10 mg disintegrating tablet, Take 1 tablet (10 mg) by mouth once daily., Disp: , Rfl:     meloxicam (Mobic) 7.5 mg tablet, TAKE ONE (1) OR TWO (2) TABLETS BY MOUTH AS NEEDED ONCE DAILY, Disp: 60 tablet, Rfl: 3    tiZANidine (Zanaflex) 4 mg tablet, Take 1 tablet three times daily as needed for muscle spasms, Disp: 30 tablet, Rfl: 3    DULoxetine (Cymbalta) 30 mg DR capsule, Take 1 capsule (30 mg) by mouth 1 (one) time per week. Do not crush or chew., Disp: , Rfl:     " DULoxetine (Cymbalta) 60 mg DR capsule, Take 1 capsule (60 mg) by mouth once daily in the evening. Do not crush or chew., Disp: , Rfl:     famotidine (Pepcid) 40 mg tablet, Take 1 tablet (40 mg) by mouth once daily., Disp: 19 tablet, Rfl: 0      Assessment/Plan   Diagnoses and all orders for this visit:  Inadequate caloric intake  Inadequate dietary caloric intake  Class 3 severe obesity due to excess calories without serious comorbidity with body mass index (BMI) of 50.0 to 59.9 in adult (CMS/[facility])  Bariatric surgery status  -     Referral to Gastroenterology; Future  -     C-Peptide; Future  -     Drug Screen, Urine With Reflex to Confirmation; Future  -     H. Pylori Breath Test; Future  -     Hemoglobin A1C; Future  -     Urinalysis with Reflex Microscopic and Culture; Future  -     Nicotine + Metabolites, Urine; Future  -     CBC and Auto Differential; Future  -     Ferritin; Future  -     Iron and TIBC; Future  -     Comprehensive Metabolic Panel; Future  -     Parathyroid Hormone, Intact; Future  -     Folate; Future  -     Vitamin B12; Future  -     Vitamin B1, Whole Blood; Future  -     Vitamin B6; Future  -     Vitamin A; Future  -     Vitamin K; Future  -     Vitamin E; Future  -     TSH with reflex to Free T4 if abnormal; Future  -     Lipid Panel; Future  -     Coagulation Screen; Future  -     Referral to Adult Sleep Medicine; Future  -     Referral to Pulmonology; Future  -     Referral to Cardiology; Future  -     Referral to Psychology; Future  -     In-Center Sleep Study (Non-Sleep Provider Only)    - Initiate process for bariatric surgery  - Counseling on dietary modifications to support weight loss: Recommend calorie reduced diet through an emphasis on high quality protein, avoidance of processed carbohydrates and added sugars in favor of high fiber whole food carbohydrate sources and appropriately portioned heart healthy fat source.  Encourage elimination of all dietary added sugar beverages.   Dietary resource handout provided.  - Initiate exercise program, start with light aerobic activity in short term intervals until gradually able to increase intensity and duration to goal > 250 minutes weekly  - Obtain baseline labs  - Refer to sleep medicine for MUMTAZ evaluation, cardiology for presurg clearance & psychology  - Establish with outpatient nutrition for dietary counseling until transition over to bariatric nutrition team     Follow up with bariatric surgeon.     I spent 60 minutes in the professional and overall care of this patient.      Mery Aguilar, TORRES-CNP

## 2023-10-07 PROBLEM — E63.9 INADEQUATE CALORIC INTAKE: Status: ACTIVE | Noted: 2023-10-07

## 2023-10-07 PROBLEM — Z98.84 BARIATRIC SURGERY STATUS: Status: ACTIVE | Noted: 2023-10-07

## 2023-10-07 PROBLEM — E66.813 CLASS 3 SEVERE OBESITY DUE TO EXCESS CALORIES WITHOUT SERIOUS COMORBIDITY WITH BODY MASS INDEX (BMI) OF 50.0 TO 59.9 IN ADULT: Status: ACTIVE | Noted: 2023-08-02

## 2023-10-07 PROBLEM — E63.9 INADEQUATE DIETARY CALORIC INTAKE: Status: ACTIVE | Noted: 2023-10-07

## 2023-10-16 NOTE — PROGRESS NOTES
Subjective     Patient ID: Alicja Trimble is a 43 y.o. female who presents for New Patient Visit (Visit for weight management.).    HPI      History Of Present Illness  Alicja Trimble is a 43 y.o. female presenting with CWM.     Initial Weight: 341 lbs  Initial BMI:  Highest Weight: 474 lbs. Alicja has lost > 130 on her own within 1 year by adjusting her diet   Lowest weight:           Past Medical History:   Diagnosis Date    Gestational diabetes mellitus in pregnancy, unspecified control 2017    Gestational diabetes mellitus (GDM) in second trimester, gestational diabetes method of control unspecified    Hypertrophy of tonsils with hypertrophy of adenoids 2014    Tonsillar and adenoid hypertrophy    Personal history of gestational diabetes 2017    History of gestational diabetes mellitus (GDM)    Snoring 11/10/2014    Snoring      Past Surgical History:   Procedure Laterality Date     SECTION, CLASSIC  11/10/2014     Section    GALLBLADDER SURGERY  11/10/2014    Gallbladder Surgery    OTHER SURGICAL HISTORY  2021    Laparoscopic hysterectomy    OTHER SURGICAL HISTORY  2021    Bilateral salpingectomy    OTHER SURGICAL HISTORY  2021    Cystoscopy    TUBAL LIGATION  2017    Tubal Ligation      Family History   Problem Relation Name Age of Onset    Lung disease Mother      Diabetes Paternal Grandmother      Diabetes Other        Social History     Tobacco Use   Smoking Status Former    Types: Cigarettes    Quit date:     Years since quittin.8   Smokeless Tobacco Never      Social History     Substance and Sexual Activity   Drug Use Yes    Types: Marijuana      Social History     Substance and Sexual Activity   Alcohol Use Yes    Comment: occassionally      Social History  She reports that she quit smoking about 20 years ago. Her smoking use included cigarettes. She has never used smokeless tobacco. She reports current alcohol use. She reports  "current drug use. Drug: Marijuana.    Allergies  Allergies   Allergen Reactions    Pollen Extracts Runny nose          VITALS  Visit Vitals  /85   Pulse 64   Temp 35.9 °C (96.7 °F)   Resp 18   Ht 1.702 m (5' 7\")   Wt (!) 155 kg (341 lb 1.6 oz)   LMP 10/07/2021   BMI 53.42 kg/m²   OB Status Hysterectomy   Smoking Status Former   BSA 2.71 m²        LABS  Lab Results   Component Value Date/Time    RNYRRMGM96 275 02/21/2023 1202    TSH 1.01 02/21/2023 1202    FOLATE 3.7 (A) 06/23/2022 0910    VITD25 37 08/03/2023 0830    TIBC 327 02/21/2023 1202    IRON 109 02/21/2023 1202    FERRITIN 118 02/21/2023 1202       ROS  Review of Systems  Musculoskeletal:  Positive for arthralgias (left hip).    Rest of the systems are negative       PHYSICAL EXAM  Physical Exam  General- No acute distress, well appearing and well nourished. Obese  HEENT - no erythema, swelling or discharge. Neck supple, no cervical lymphadenopathy.   Pulmonary - respiratory effort normal, lungs CTAB.   Cardiovascular - HRR, no m/r/g  Extremities - no edema and/or varicosities, no peripheral edema  Abdomen - Soft, Non-tender, non-distended, no abdominal masses.   Musculoskeletal - Range of motion WNL  Skin - normal without rashes or lesions.  Neurologic - reflexes intact, coordination WNL, gait WNL  Psychiatric - AXO x3, mood and affect appropriate      ASSESSMENT/PLAN  Patient here for medical weight loss.    Assessment/Plan   Problem List Items Addressed This Visit       Diabetes mellitus (CMS/HCC)    Class 3 severe obesity due to excess calories without serious comorbidity with body mass index (BMI) of 50.0 to 59.9 in adult (CMS/HCC) - Primary    Inadequate caloric intake    Inadequate dietary caloric intake    Bariatric surgery status    Relevant Orders    Referral to Gastroenterology    C-Peptide    Drug Screen, Urine With Reflex to Confirmation    H. Pylori Breath Test    Hemoglobin A1C    Urinalysis with Reflex Microscopic and Culture    " Nicotine + Metabolites, Urine    CBC and Auto Differential    Ferritin    Iron and TIBC    Comprehensive Metabolic Panel    Parathyroid Hormone, Intact    Folate    Vitamin B12    Vitamin B1, Whole Blood    Vitamin B6    Vitamin A    Vitamin K    Vitamin E    TSH with reflex to Free T4 if abnormal    Lipid Panel    Coagulation Screen    Referral to Adult Sleep Medicine    Referral to Pulmonology    Referral to Cardiology    Referral to Psychology    In-Center Sleep Study (Non-Sleep Provider Only)       - Initiate process for bariatric surgery  - Counseling on dietary modifications to support weight loss: Recommend calorie reduced diet through an emphasis on high quality protein, avoidance of processed carbohydrates and added sugars in favor of high fiber whole food carbohydrate sources and appropriately portioned heart healthy fat source.  Encourage elimination of all dietary added sugar beverages.  Dietary resource handout provided.  - Initiate exercise program, start with light aerobic activity in short term intervals until gradually able to increase intensity and duration to goal > 250 minutes weekly  - Obtain baseline labs  - Refer to sleep medicine for MUMTAZ evaluation, cardiology for presurg clearance & psychology  - Establish with outpatient nutrition for dietary counseling until transition over to bariatric nutrition team      Follow up with bariatric surgeon.      I spent 60 minutes in the professional and overall care of this patient.        Mery Aguilar, TORRES-CNP

## 2023-10-23 ENCOUNTER — APPOINTMENT (OUTPATIENT)
Dept: RADIOLOGY | Facility: CLINIC | Age: 43
End: 2023-10-23
Payer: COMMERCIAL

## 2023-10-31 ENCOUNTER — TELEPHONE (OUTPATIENT)
Dept: PRIMARY CARE | Facility: CLINIC | Age: 43
End: 2023-10-31
Payer: COMMERCIAL

## 2023-10-31 NOTE — TELEPHONE ENCOUNTER
Meloxicam to [facility]. Pt requested this med refill last week and never received it. Per Pt, please refill asap as she is now out.

## 2023-11-02 ENCOUNTER — APPOINTMENT (OUTPATIENT)
Dept: SLEEP MEDICINE | Facility: HOSPITAL | Age: 43
End: 2023-11-02
Payer: COMMERCIAL

## 2023-11-06 ENCOUNTER — TELEPHONE (OUTPATIENT)
Dept: SURGERY | Facility: HOSPITAL | Age: 43
End: 2023-11-06

## 2023-11-06 NOTE — TELEPHONE ENCOUNTER
Spoke with patient, name and  verified:     Patient states she has been waiting for a patient navigator to reach out in regards to her process for surgery.     Patient had an OV with PARKER Mery 10/6 and expressed interest in surgery, pt also completed online form and watched video.

## 2023-11-07 ENCOUNTER — APPOINTMENT (OUTPATIENT)
Dept: CARDIOLOGY | Facility: HOSPITAL | Age: 43
End: 2023-11-07
Payer: COMMERCIAL

## 2023-11-07 ENCOUNTER — OFFICE VISIT (OUTPATIENT)
Dept: GASTROENTEROLOGY | Facility: HOSPITAL | Age: 43
End: 2023-11-07
Payer: COMMERCIAL

## 2023-11-07 VITALS
RESPIRATION RATE: 20 BRPM | SYSTOLIC BLOOD PRESSURE: 156 MMHG | BODY MASS INDEX: 45.99 KG/M2 | WEIGHT: 293 LBS | HEART RATE: 60 BPM | TEMPERATURE: 96.9 F | OXYGEN SATURATION: 97 % | DIASTOLIC BLOOD PRESSURE: 87 MMHG | HEIGHT: 67 IN

## 2023-11-07 DIAGNOSIS — Z98.84 BARIATRIC SURGERY STATUS: ICD-10-CM

## 2023-11-07 DIAGNOSIS — K57.92 DIVERTICULITIS: Primary | ICD-10-CM

## 2023-11-07 PROCEDURE — 3079F DIAST BP 80-89 MM HG: CPT | Performed by: NURSE PRACTITIONER

## 2023-11-07 PROCEDURE — 99204 OFFICE O/P NEW MOD 45 MIN: CPT | Performed by: NURSE PRACTITIONER

## 2023-11-07 PROCEDURE — 3008F BODY MASS INDEX DOCD: CPT | Performed by: NURSE PRACTITIONER

## 2023-11-07 PROCEDURE — 1036F TOBACCO NON-USER: CPT | Performed by: NURSE PRACTITIONER

## 2023-11-07 PROCEDURE — 3077F SYST BP >= 140 MM HG: CPT | Performed by: NURSE PRACTITIONER

## 2023-11-07 PROCEDURE — 99214 OFFICE O/P EST MOD 30 MIN: CPT | Performed by: NURSE PRACTITIONER

## 2023-11-07 RX ORDER — POLYETHYLENE GLYCOL-3350 AND ELECTROLYTES 236; 6.74; 5.86; 2.97; 22.74 G/274.31G; G/274.31G; G/274.31G; G/274.31G; G/274.31G
4000 POWDER, FOR SOLUTION ORAL ONCE
Qty: 4000 ML | Refills: 0 | Status: SHIPPED | OUTPATIENT
Start: 2023-11-07 | End: 2023-11-14 | Stop reason: ALTCHOICE

## 2023-11-07 RX ORDER — METHYLCELLULOSE 500 MG/1
1 TABLET ORAL ONCE
Qty: 90 TABLET | Refills: 2 | Status: SHIPPED | OUTPATIENT
Start: 2023-11-07 | End: 2023-11-14 | Stop reason: ALTCHOICE

## 2023-11-07 ASSESSMENT — PAIN SCALES - GENERAL: PAINLEVEL: 9

## 2023-11-07 ASSESSMENT — ENCOUNTER SYMPTOMS
EYES NEGATIVE: 1
CONSTITUTIONAL NEGATIVE: 1
ALLERGIC/IMMUNOLOGIC NEGATIVE: 1
GASTROINTESTINAL NEGATIVE: 1
CARDIOVASCULAR NEGATIVE: 1
ENDOCRINE NEGATIVE: 1
PSYCHIATRIC NEGATIVE: 1
HEMATOLOGIC/LYMPHATIC NEGATIVE: 1
MUSCULOSKELETAL NEGATIVE: 1
NEUROLOGICAL NEGATIVE: 1
RESPIRATORY NEGATIVE: 1

## 2023-11-07 ASSESSMENT — PATIENT HEALTH QUESTIONNAIRE - PHQ9
2. FEELING DOWN, DEPRESSED OR HOPELESS: NOT AT ALL
SUM OF ALL RESPONSES TO PHQ9 QUESTIONS 1 AND 2: 0
1. LITTLE INTEREST OR PLEASURE IN DOING THINGS: NOT AT ALL

## 2023-11-07 NOTE — PROGRESS NOTES
Subjective   Patient ID: Tom Trimble is a 43 y.o. female who presents for Diverticulitis (New patient visit).  HPI  43-year-old female for evaluation of diverticulitis.  2023 H&H 11.5 and 36.5  Lipase 6  CT abdomen pelvis with contrast shows a small fat-containing umbilical hernia, gallbladder surgically absent, diverticulitis in the sigmoid colon (SCAD).    Was seen in PCP office and was told to follow up with GI  Bouts of diarrhea with mucous and then hard stool with some blood  Doesn't feel complete  LLQ pain at times  Bloating and pain relieved after BM  Wakes her up form sleep at times to move her bowels  Eats lots of fruits, veggies  Drinks water  Never had a colonoscopy  Bm: daily , sometimes twice  Not easy to move out stool at times  Sometimes has to change positions   3 children - vaginal and 2   No tearing or episitomy            Review of Systems   Constitutional: Negative.    HENT: Negative.     Eyes: Negative.    Respiratory: Negative.     Cardiovascular: Negative.    Gastrointestinal: Negative.    Endocrine: Negative.    Genitourinary: Negative.    Musculoskeletal: Negative.    Skin: Negative.    Allergic/Immunologic: Negative.    Neurological: Negative.    Hematological: Negative.    Psychiatric/Behavioral: Negative.         Objective   Physical Exam  Constitutional:       Appearance: Normal appearance.   HENT:      Head: Normocephalic and atraumatic.      Nose: Nose normal.      Mouth/Throat:      Mouth: Mucous membranes are moist.   Eyes:      Pupils: Pupils are equal, round, and reactive to light.   Cardiovascular:      Rate and Rhythm: Normal rate and regular rhythm.      Pulses: Normal pulses.      Heart sounds: Normal heart sounds.   Pulmonary:      Effort: Pulmonary effort is normal.      Breath sounds: Normal breath sounds.   Abdominal:      General: Bowel sounds are normal.      Palpations: Abdomen is soft.   Musculoskeletal:         General: Normal range of motion.       Cervical back: Normal range of motion and neck supple.   Skin:     General: Skin is warm and dry.   Neurological:      Mental Status: She is alert.   Psychiatric:         Mood and Affect: Mood normal.         Assessment/Plan     S/p Diverticulitis-I would recommend a high-fiber diet to help prevent stool from getting stuck in the pockets in your colon.  I would also recommend a colonoscopy for evaluation of the colon and possible biopsy of the diverticuli to rule out (SCAD) segmental colitis associated with diverticulosis.     You can add a fiber supplement such as metamucil , benefiber or citrucel to help add fiber to your GI tract.     ?constipation - this may be causing the bladder symptoms you are having as well. If you do not see and improvement with the  fiber I may recommend pelvic floor therapy.    I do not recommend you get any further colonics as this can put you at risk for perforation.    I will call you with your results and determine follow up

## 2023-11-07 NOTE — PATIENT INSTRUCTIONS
S/p Diverticulitis-I would recommend a high-fiber diet to help prevent stool from getting stuck in the pockets in your colon.  I would also recommend a colonoscopy for evaluation of the colon and possible biopsy of the diverticuli to rule out (SCAD) segmental colitis associated with diverticulosis.     You can add a fiber supplement such as metamucil , benefiber or citrucel to help add fiber to your GI tract.     ?constipation - this may be causing the bladder symptoms you are having as well. If you do not see and improvement with the  fiber I may recommend pelvic floor therapy.    I do not recommend you get any further colonics as this can put you at risk for perforation.    I will call you with your results and determine follow up

## 2023-11-09 ENCOUNTER — APPOINTMENT (OUTPATIENT)
Dept: PULMONOLOGY | Facility: HOSPITAL | Age: 43
End: 2023-11-09
Payer: COMMERCIAL

## 2023-11-14 ENCOUNTER — APPOINTMENT (OUTPATIENT)
Dept: SLEEP MEDICINE | Facility: CLINIC | Age: 43
End: 2023-11-14
Payer: COMMERCIAL

## 2023-11-14 ENCOUNTER — OFFICE VISIT (OUTPATIENT)
Dept: PRIMARY CARE | Facility: CLINIC | Age: 43
End: 2023-11-14
Payer: COMMERCIAL

## 2023-11-14 ENCOUNTER — OFFICE VISIT (OUTPATIENT)
Dept: CARDIOLOGY | Facility: CLINIC | Age: 43
End: 2023-11-14
Payer: COMMERCIAL

## 2023-11-14 VITALS
WEIGHT: 293 LBS | HEART RATE: 66 BPM | BODY MASS INDEX: 45.99 KG/M2 | HEIGHT: 67 IN | SYSTOLIC BLOOD PRESSURE: 160 MMHG | DIASTOLIC BLOOD PRESSURE: 90 MMHG

## 2023-11-14 VITALS
HEIGHT: 67 IN | HEART RATE: 57 BPM | OXYGEN SATURATION: 100 % | BODY MASS INDEX: 45.99 KG/M2 | RESPIRATION RATE: 20 BRPM | WEIGHT: 293 LBS

## 2023-11-14 DIAGNOSIS — Z98.84 BARIATRIC SURGERY STATUS: ICD-10-CM

## 2023-11-14 DIAGNOSIS — I10 PRIMARY HYPERTENSION: ICD-10-CM

## 2023-11-14 DIAGNOSIS — M16.11 PRIMARY OSTEOARTHRITIS OF RIGHT HIP: Primary | ICD-10-CM

## 2023-11-14 DIAGNOSIS — M62.838 MUSCLE SPASM: ICD-10-CM

## 2023-11-14 PROCEDURE — 3008F BODY MASS INDEX DOCD: CPT | Performed by: INTERNAL MEDICINE

## 2023-11-14 PROCEDURE — 1036F TOBACCO NON-USER: CPT | Performed by: INTERNAL MEDICINE

## 2023-11-14 PROCEDURE — 99213 OFFICE O/P EST LOW 20 MIN: CPT | Performed by: INTERNAL MEDICINE

## 2023-11-14 PROCEDURE — 93010 ELECTROCARDIOGRAM REPORT: CPT | Performed by: INTERNAL MEDICINE

## 2023-11-14 PROCEDURE — 3080F DIAST BP >= 90 MM HG: CPT | Performed by: INTERNAL MEDICINE

## 2023-11-14 PROCEDURE — 93005 ELECTROCARDIOGRAM TRACING: CPT | Performed by: INTERNAL MEDICINE

## 2023-11-14 PROCEDURE — 99214 OFFICE O/P EST MOD 30 MIN: CPT | Performed by: NURSE PRACTITIONER

## 2023-11-14 PROCEDURE — 3008F BODY MASS INDEX DOCD: CPT | Performed by: NURSE PRACTITIONER

## 2023-11-14 PROCEDURE — 99203 OFFICE O/P NEW LOW 30 MIN: CPT | Performed by: INTERNAL MEDICINE

## 2023-11-14 PROCEDURE — 1036F TOBACCO NON-USER: CPT | Performed by: NURSE PRACTITIONER

## 2023-11-14 PROCEDURE — 99204 OFFICE O/P NEW MOD 45 MIN: CPT | Performed by: NURSE PRACTITIONER

## 2023-11-14 PROCEDURE — 3077F SYST BP >= 140 MM HG: CPT | Performed by: INTERNAL MEDICINE

## 2023-11-14 RX ORDER — MELOXICAM 7.5 MG/1
7.5 TABLET ORAL DAILY
Qty: 30 TABLET | Refills: 11 | Status: SHIPPED | OUTPATIENT
Start: 2023-11-14 | End: 2024-04-09 | Stop reason: ALTCHOICE

## 2023-11-14 RX ORDER — TIZANIDINE HYDROCHLORIDE 4 MG/1
4 CAPSULE, GELATIN COATED ORAL NIGHTLY
Qty: 30 CAPSULE | Refills: 11 | Status: SHIPPED | OUTPATIENT
Start: 2023-11-14 | End: 2024-11-13

## 2023-11-14 RX ORDER — DULOXETIN HYDROCHLORIDE 30 MG/1
30 CAPSULE, DELAYED RELEASE ORAL 2 TIMES DAILY
Qty: 60 CAPSULE | Refills: 0 | Status: SHIPPED | OUTPATIENT
Start: 2023-11-14 | End: 2023-12-21 | Stop reason: ALTCHOICE

## 2023-11-14 ASSESSMENT — ENCOUNTER SYMPTOMS
DEPRESSION: 0
LOSS OF SENSATION IN FEET: 0
OCCASIONAL FEELINGS OF UNSTEADINESS: 0
HYPERTENSION: 1

## 2023-11-14 ASSESSMENT — PAIN SCALES - GENERAL
PAINLEVEL: 6
PAINLEVEL: 8

## 2023-11-14 ASSESSMENT — COLUMBIA-SUICIDE SEVERITY RATING SCALE - C-SSRS: 1. IN THE PAST MONTH, HAVE YOU WISHED YOU WERE DEAD OR WISHED YOU COULD GO TO SLEEP AND NOT WAKE UP?: NO

## 2023-11-14 NOTE — PROGRESS NOTES
"Subjective Shoneia M Collins \"Tom\" is a 43 y.o. female who presents for New Patient Visit.  HPI    Here to establish care. Notes that her last PCP moved out of town.     PMH: Diverticulitus, hip arthritis, secondary knee pain, hidrenitis suppertiva,     PSH: 2 c-sections, hidradenitis excision, tonsillectomy, hysterectomy, cholecystectomy,   Bariatric program, suspecting she will have surgery by the end of the year. Plans to complete the rest of specialist consultation in the next few weeks. She is motivated to have bariatric surgery to improve her health and to eventually undergo necessary hip replacement. She has lost 130 pounds currently. She typically takes meloxicam for hip pain but has been without the medication for the last month, reports that she was unable to get a refill. She notes that her weight has increased since being without medication because the pain does not allow her to be active. She would like to re-start medication.   She also reports that she was given a prescription for cymbalta from pain management that she never started. Requesting 30mg tablet be refilled. She has the 60mg dosing.     Needs refill of meloxicam today     Elevated blood pressure noted over the last several visits. Patient refused to have blood pressure taken today. She feels that the blood pressures are elevated due to inaccurate reads. Denies headache, chest pain, palpitations, blurred vision, or dizziness  She has been treated with a diuretic in the past but is not currently taking medication. We discussed the benefits of blood pressure optimization prior to surgery. She will continue to closely self monitor her blood pressure and [place] back if she would like to initiate medication management, or will discuss with bariatric surgery prior to procedure.     All systems reviewed. Review of systems negative except for noted positives in HPI    Objective     Pulse 57   Resp 20   Ht 1.702 m (5' 7\")   Wt (!) 156 kg " (344 lb)   LMP 10/07/2021   SpO2 100%   BMI 53.88 kg/m²    Vital signs noted and reviewed.       Physical Exam  Constitutional:       Appearance: Normal appearance.   Cardiovascular:      Rate and Rhythm: Normal rate and regular rhythm.   Pulmonary:      Effort: Pulmonary effort is normal. No respiratory distress.      Breath sounds: Normal breath sounds.   Skin:     General: Skin is warm and dry.   Neurological:      Mental Status: She is oriented to person, place, and time.   Psychiatric:         Mood and Affect: Mood normal.             Assessment/Plan   Problem List Items Addressed This Visit       Osteoarthritis of right hip - Primary    Relevant Medications    meloxicam (Mobic) 7.5 mg tablet    DULoxetine (Cymbalta) 30 mg DR capsule     Other Visit Diagnoses       Muscle spasm        Relevant Medications    tiZANidine (Zanaflex) 4 mg capsule

## 2023-11-14 NOTE — PROGRESS NOTES
Subjective   Tom Trimble is a 43 y.o. female.    Chief Complaint:  Pre-op clearance and Hypertension     Hypertension      This is a 44 y/o female here today for pre-op clearance for weight loss surgery. Reviewed medical/surgical history, lab/test results, and current medications. Denies chest pain, sob or heart palpitations.    Review of Systems   All other systems reviewed and are negative.      Objective   Physical Exam  Gen.: Pleasant, 44 y/o female in no obvious distress.   HEENT: Normal EOMs without thyromegaly or lymphadenopathy.  Lungs: Clear with good air movement no crackles or wheezing.  Carotid: Normal JVP and carotid upstrokes without bruit.   Cardiac: There is a normal S1 and S2 with normal heart tones and no murmur rub or S3.  Abdomen: Nontender with normal bowel sounds and no bruits.  Extremities: No edema.  Skin: No acute rash.  Neuro: Intact without focal motor deficit.  Vascular: Normal radial pulses bilaterally. Normal distal pulses bilaterally.      Lab Review:   [facility] Encounter on 08/03/2023   Component Date Value    WBC 08/03/2023 6.3     RBC 08/03/2023 4.66     Hemoglobin 08/03/2023 11.9 (L)     Hematocrit 08/03/2023 37.1     MCV 08/03/2023 80     MCHC 08/03/2023 32.1     Platelets 08/03/2023 240     RDW 08/03/2023 16.5 (H)     Neutrophils % 08/03/2023 70.8     Immature Granulocytes %,* 08/03/2023 0.2     Lymphocytes % 08/03/2023 21.7     Monocytes % 08/03/2023 6.3     Eosinophils % 08/03/2023 0.8     Basophils % 08/03/2023 0.2     Neutrophils Absolute 08/03/2023 4.47     Lymphocytes Absolute 08/03/2023 1.37     Monocytes Absolute 08/03/2023 0.40     Eosinophils Absolute 08/03/2023 0.05     Basophils Absolute 08/03/2023 0.01     Glucose 08/03/2023 69 (L)     Sodium 08/03/2023 135 (L)     Potassium 08/03/2023 3.4 (L)     Chloride 08/03/2023 100     Bicarbonate 08/03/2023 21     Anion Gap 08/03/2023 17     Urea Nitrogen 08/03/2023 8     Creatinine 08/03/2023 0.69     GFR Female  08/03/2023 >90     Calcium 08/03/2023 9.0     Albumin 08/03/2023 3.9     Alkaline Phosphatase 08/03/2023 48     Total Protein 08/03/2023 7.6     AST 08/03/2023 16     Total Bilirubin 08/03/2023 1.1     ALT (SGPT) 08/03/2023 14     Vitamin D, 25-Hydroxy 08/03/2023 37    Office Visit on 08/02/2023   Component Date Value    POC Color, Urine 08/02/2023 Shelli (A)     POC Appearance, Urine 08/02/2023 Hazy (A)     POC Specific Gravity, Ur* 08/02/2023 >=1.030     POC PH, Urine 08/02/2023 6.0     POC Protein, Urine 08/02/2023 30 (1+)     POC Glucose, Urine 08/02/2023 NEGATIVE     POC Blood, Urine 08/02/2023 NEGATIVE     POC Ketones, Urine 08/02/2023 >=160 (4+) (A)     POC Bilirubin, Urine 08/02/2023 MODERATE (2+) (A)     POC Urobilinogen, Urine 08/02/2023 0.2     Poc Nitrite, Urine 08/02/2023 NEGATIVE     POC Leukocytes, Urine 08/02/2023 NEGATIVE     Urine Culture 08/02/2023 **Culture Comments - See Below        Assessment/Plan   The encounter diagnosis was Bariatric surgery status.  1.?  Hypertension.  The patient is a 43-year-old black female with a history of obesity.  She denies a history of hypertension although she was diagnosed as such in 2014 and simply watch her salt intake.  She did have preeclampsia with pregnancy.  She was frequently placed on medication which was then discontinued.  Blood pressure borderline today.  Will defer institution of antihypertensive agent such as amlodipine pending her return follow-up visit in 3/2024.  Will check echocardiogram at that time as well.  EKG today shows sinus rhythm and is unremarkable.    2.  Obesity.  The patient denies a history of diabetes although he did have gestational diabetes with pregnancy.  The patient is being evaluated for bariatric therapy.    3.  Bilateral hip DJD right greater than left.  Patient has used meloxicam as needed in the past.

## 2023-11-14 NOTE — PATIENT INSTRUCTIONS
Thank you for coming in for your visit today!    Please follow up in 5 months or sooner if needed.    RESTART meloxicam  START duloxetine. We sent the 30mg starter dose to your pharmacy.     For your blood pressure:    Keep a log of your blood pressure. Be sure to bring it with you to your next appointment so we can review it together.  Adhere to the DASH diet. This includes decreasing your salt/sodium intake. Avoid canned foods, lunch meats, and frozen foods.  Exercise for 30 minutes daily.    If you would like to start medication, please come back for a follow up.       Call 911 or go to the emergency room if you have pain in your chest, difficulty breathing, or other life threatening symptoms.

## 2023-11-15 ENCOUNTER — DOCUMENTATION (OUTPATIENT)
Dept: SURGERY | Facility: CLINIC | Age: 43
End: 2023-11-15
Payer: COMMERCIAL

## 2023-11-15 NOTE — PROGRESS NOTES
Recvd telephone encounter from Cynthia regarding pt's request to be scheduled. Pt called and scheduled. She eleceted [facility] for soonest appointment, and Thong Browne was assigned. Appt confirmation and np packet was emailed to confirmed email address by patient. Work list was created.

## 2023-11-16 LAB
ATRIAL RATE: 66 BPM
P AXIS: 33 DEGREES
P OFFSET: 185 MS
P ONSET: 128 MS
PR INTERVAL: 190 MS
Q ONSET: 223 MS
QRS COUNT: 10 BEATS
QRS DURATION: 86 MS
QT INTERVAL: 378 MS
QTC CALCULATION(BAZETT): 396 MS
QTC FREDERICIA: 390 MS
R AXIS: 20 DEGREES
T AXIS: 26 DEGREES
T OFFSET: 412 MS
VENTRICULAR RATE: 66 BPM

## 2023-11-17 ENCOUNTER — APPOINTMENT (OUTPATIENT)
Dept: PULMONOLOGY | Facility: HOSPITAL | Age: 43
End: 2023-11-17
Payer: COMMERCIAL

## 2023-11-17 ENCOUNTER — HOSPITAL ENCOUNTER (OUTPATIENT)
Dept: RADIOLOGY | Facility: HOSPITAL | Age: 43
Discharge: HOME | End: 2023-11-17
Payer: COMMERCIAL

## 2023-11-17 ENCOUNTER — ANCILLARY PROCEDURE (OUTPATIENT)
Dept: RADIOLOGY | Facility: CLINIC | Age: 43
End: 2023-11-17
Payer: COMMERCIAL

## 2023-11-17 ENCOUNTER — OFFICE VISIT (OUTPATIENT)
Dept: PULMONOLOGY | Facility: HOSPITAL | Age: 43
End: 2023-11-17
Payer: COMMERCIAL

## 2023-11-17 VITALS
SYSTOLIC BLOOD PRESSURE: 117 MMHG | WEIGHT: 293 LBS | BODY MASS INDEX: 53.68 KG/M2 | OXYGEN SATURATION: 97 % | HEART RATE: 75 BPM | TEMPERATURE: 97.1 F | DIASTOLIC BLOOD PRESSURE: 86 MMHG

## 2023-11-17 VITALS — WEIGHT: 293 LBS | HEIGHT: 67 IN | BODY MASS INDEX: 45.99 KG/M2

## 2023-11-17 DIAGNOSIS — Z98.84 BARIATRIC SURGERY STATUS: Primary | ICD-10-CM

## 2023-11-17 DIAGNOSIS — Z12.31 ENCOUNTER FOR SCREENING MAMMOGRAM FOR MALIGNANT NEOPLASM OF BREAST: ICD-10-CM

## 2023-11-17 DIAGNOSIS — R92.8 ABNORMAL MAMMOGRAM OF RIGHT BREAST: ICD-10-CM

## 2023-11-17 DIAGNOSIS — Z98.84 BARIATRIC SURGERY STATUS: ICD-10-CM

## 2023-11-17 PROCEDURE — 3079F DIAST BP 80-89 MM HG: CPT | Performed by: NURSE PRACTITIONER

## 2023-11-17 PROCEDURE — 76642 ULTRASOUND BREAST LIMITED: CPT | Performed by: STUDENT IN AN ORGANIZED HEALTH CARE EDUCATION/TRAINING PROGRAM

## 2023-11-17 PROCEDURE — 1036F TOBACCO NON-USER: CPT | Performed by: NURSE PRACTITIONER

## 2023-11-17 PROCEDURE — 77066 DX MAMMO INCL CAD BI: CPT

## 2023-11-17 PROCEDURE — 77066 DX MAMMO INCL CAD BI: CPT | Performed by: STUDENT IN AN ORGANIZED HEALTH CARE EDUCATION/TRAINING PROGRAM

## 2023-11-17 PROCEDURE — 71046 X-RAY EXAM CHEST 2 VIEWS: CPT | Mod: FY

## 2023-11-17 PROCEDURE — 3074F SYST BP LT 130 MM HG: CPT | Performed by: NURSE PRACTITIONER

## 2023-11-17 PROCEDURE — 99204 OFFICE O/P NEW MOD 45 MIN: CPT | Performed by: NURSE PRACTITIONER

## 2023-11-17 PROCEDURE — 99214 OFFICE O/P EST MOD 30 MIN: CPT | Mod: 25 | Performed by: NURSE PRACTITIONER

## 2023-11-17 PROCEDURE — 76642 ULTRASOUND BREAST LIMITED: CPT | Mod: RT

## 2023-11-17 PROCEDURE — 3008F BODY MASS INDEX DOCD: CPT | Performed by: NURSE PRACTITIONER

## 2023-11-17 PROCEDURE — 71046 X-RAY EXAM CHEST 2 VIEWS: CPT | Performed by: RADIOLOGY

## 2023-11-17 PROCEDURE — 77062 BREAST TOMOSYNTHESIS BI: CPT | Performed by: STUDENT IN AN ORGANIZED HEALTH CARE EDUCATION/TRAINING PROGRAM

## 2023-11-17 SDOH — ECONOMIC STABILITY: FOOD INSECURITY: WITHIN THE PAST 12 MONTHS, THE FOOD YOU BOUGHT JUST DIDN'T LAST AND YOU DIDN'T HAVE MONEY TO GET MORE.: NEVER TRUE

## 2023-11-17 SDOH — ECONOMIC STABILITY: FOOD INSECURITY: WITHIN THE PAST 12 MONTHS, YOU WORRIED THAT YOUR FOOD WOULD RUN OUT BEFORE YOU GOT MONEY TO BUY MORE.: NEVER TRUE

## 2023-11-17 ASSESSMENT — PATIENT HEALTH QUESTIONNAIRE - PHQ9
1. LITTLE INTEREST OR PLEASURE IN DOING THINGS: NOT AT ALL
2. FEELING DOWN, DEPRESSED OR HOPELESS: NOT AT ALL
SUM OF ALL RESPONSES TO PHQ9 QUESTIONS 1 & 2: 0

## 2023-11-17 ASSESSMENT — ENCOUNTER SYMPTOMS
NUMBNESS: 0
ABDOMINAL PAIN: 0
NERVOUS/ANXIOUS: 0
FEVER: 0
WEAKNESS: 0
VOICE CHANGE: 0
FATIGUE: 0
PALPITATIONS: 0
DIARRHEA: 0
VOMITING: 0
ARTHRALGIAS: 0
NAUSEA: 0
DIZZINESS: 0
RHINORRHEA: 0
BACK PAIN: 0
HEADACHES: 0
JOINT SWELLING: 0
AGITATION: 0
EYE PAIN: 0
SINUS PRESSURE: 0
MYALGIAS: 0

## 2023-11-17 ASSESSMENT — LIFESTYLE VARIABLES: HOW OFTEN DO YOU HAVE A DRINK CONTAINING ALCOHOL: 2-4 TIMES A MONTH

## 2023-11-17 ASSESSMENT — PAIN SCALES - GENERAL: PAINLEVEL: 5

## 2023-11-17 NOTE — PROGRESS NOTES
" Patient: Shoneia M Collins    [id number]  : 1980 -- AGE 43 y.o.    Provider: CA Sexton     Location [facility]   Service Date: 2023              [facility]  New Visit Note      HISTORY OF PRESENT ILLNESS     The patient's referring provider is: Mery Aguilar APRN-C*    HISTORY OF PRESENT ILLNESS   Shoneia M Collins \"Tom\" is a 43 y.o. female who presents to a [facility] for an evaluation with concerns of Procedure (Pt. Here today for NPV ). I have independently interviewed and examined the patient in the office and reviewed available records.    Current History    On today's visit, the patient reports she has no breathing issues. She states when she was heavier she had MONZON with going up steps. She states she lost 130lbs on her own. She denies any cough, wheezing, MONZON, SOB at rest, or CP. allergies - She states [facility] has noticed she started having seasonal allergies since she turned 41. She had a sinus infection for the first time in the spring. She saw an ENT and they started her on flonase and she was started on a a medication for GERD. She feels her post nasal drip is better and her nasal congesiton has resolved.  She was started on gaviscon PRN, but has not needed it often. She states she has runny nose/ post nasal drip from dairy.     Previous pulmonary history: She has no history of recurrent infections, or lung disease as a child.      Inhalers/nebulized medications: none     Hospitalization History: She has not been hospitalized over the last year for breathing related problem.    Sleep history: she needs appt with sleep     ALLERGIES AND MEDICATIONS     ALLERGIES  Allergies   Allergen Reactions    Pollen Extracts Runny nose       MEDICATIONS  Current Outpatient Medications   Medication Sig Dispense Refill    acetaminophen (Tylenol) 500 mg tablet Take 1 tablet (500 mg) by mouth " every 6 hours if needed for mild pain (1 - 3).      chlorhexidine (Hibiclens) 4 % external liquid Apply topically.      clindamycin (Cleocin T) 1 % lotion Apply topically.      cyanocobalamin (Vitamin B-12) 500 mcg tablet Take 1 tablet (500 mcg) by mouth once daily.      DULoxetine (Cymbalta) 30 mg DR capsule Take 1 capsule (30 mg) by mouth 2 times a day. Do not crush or chew. 60 capsule 0    DULoxetine (Cymbalta) 60 mg DR capsule Take 1 capsule (60 mg) by mouth once daily in the evening. Do not crush or chew.      ferrous sulfate 325 (65 Fe) MG tablet Take 1 tablet (325 mg) by mouth once daily. 90 tablet 3    fluticasone (Flonase) 50 mcg/actuation nasal spray Administer into affected nostril(s).      folic acid (Folvite) 1 mg tablet Take by mouth.      ketoconazole (NIZOral) 2 % cream Apply topically 2 times a day.      loratadine (Claritin Reditabs) 10 mg disintegrating tablet Take 1 tablet (10 mg) by mouth once daily.      meloxicam (Mobic) 7.5 mg tablet Take 1 tablet (7.5 mg) by mouth once daily. 30 tablet 11    tiZANidine (Zanaflex) 4 mg capsule Take 1 capsule (4 mg) by mouth once daily at bedtime. 30 capsule 11     No current facility-administered medications for this visit.         PAST HISTORY     PAST MEDICAL HISTORY  - HTN -- in the past   - GERD   - pain - OA right hip   - seasonal allergies     PAST SURGICAL HISTORY  Past Surgical History:   Procedure Laterality Date     SECTION, CLASSIC  11/10/2014     Section    GALLBLADDER SURGERY  11/10/2014    Gallbladder Surgery    OTHER SURGICAL HISTORY  2021    Laparoscopic hysterectomy    OTHER SURGICAL HISTORY  2021    Bilateral salpingectomy    OTHER SURGICAL HISTORY  2021    Cystoscopy    TUBAL LIGATION  2017    Tubal Ligation       IMMUNIZATION HISTORY  Immunization History   Administered Date(s) Administered    PPD Test 2014    Tdap vaccine, age 7 year and older (BOOSTRIX) 2017       SOCIAL  HISTORY  Smokin-17 few cigarettes a week   Alcohol:  2-3 drinks per week   Illicit drugs:  marijuana occasionally -- stopped in prep for surgery    Cat at home     OCCUPATIONAL/ENVIRONMENTAL HISTORY  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    RESULTS/DATA     Pulmonary Function Test Results       None on record     Chest Radiograph     23 - No acute cardiopulmonary process        Chest CT Scan     None on record        Echocardiogram     None on record -- pending per cardiology     Other testing/ Labs      REVIEW OF SYSTEMS     REVIEW OF SYSTEMS  Review of Systems   Constitutional:  Negative for fatigue and fever.   HENT:  Negative for congestion, postnasal drip, rhinorrhea, sinus pressure and voice change.    Eyes:  Negative for pain and visual disturbance.   Cardiovascular:  Negative for chest pain, palpitations and leg swelling.   Gastrointestinal:  Negative for abdominal pain, diarrhea, nausea and vomiting.   Endocrine: Negative for cold intolerance and heat intolerance.   Musculoskeletal:  Negative for arthralgias, back pain, joint swelling and myalgias.   Skin:  Negative for rash.   Neurological:  Negative for dizziness, weakness, numbness and headaches.   Psychiatric/Behavioral:  Negative for agitation. The patient is not nervous/anxious.          PHYSICAL EXAM     VITAL SIGNS: /86   Pulse 75   Temp 36.2 °C (97.1 °F)   Wt (!) 155 kg (342 lb)   LMP 10/07/2021   SpO2 97% Comment: RA  BMI 53.68 kg/m²      CURRENT WEIGHT: [unfilled]  BMI: [unfilled]  PREVIOUS WEIGHTS:  Wt Readings from Last 3 Encounters:   23 (!) 155 kg (342 lb)   23 (!) 156 kg (343 lb 14.7 oz)   23 (!) 156 kg (345 lb)       Physical Exam  Vitals reviewed.   Constitutional:       General: She is not in acute distress.     Appearance: Normal appearance. She is not ill-appearing or toxic-appearing.    HENT:      Head: Normocephalic.      Nose: No rhinorrhea.   Cardiovascular:      Rate and Rhythm: Normal rate and regular rhythm.      Heart sounds: Normal heart sounds.   Pulmonary:      Effort: Pulmonary effort is normal. No respiratory distress.      Breath sounds: Normal breath sounds. No stridor.   Abdominal:      General: Abdomen is flat.   Musculoskeletal:         General: No swelling. Normal range of motion.   Skin:     General: Skin is warm and dry.      Nails: There is no clubbing.   Neurological:      General: No focal deficit present.      Mental Status: She is alert.   Psychiatric:         Mood and Affect: Mood normal.         Behavior: Behavior normal.         Judgment: Judgment normal.         ASSESSMENT/PLAN        1. Bariatric surgery clearance: no pulmonary symptoms   - will get CXR today     2. Snoring:   - make appt with sleep medicine     # Preoperative evaluation:  Patient is at increased risk of postoperative pulmonary complications given poor general health status (American Society of Anesthesiologists [ASA] class >2) and possible obstructive sleep apnea.  However this increased risk should not preclude the surgery.  - CXR with  No acute cardiopulmonary process   - To minimize the risk for complications we recommend the following: incentive spirometry to be started before surgery, smoking cessation for at least 8 weeks before surgery,  Use of CPAP machine , early ambulation, minimize sedation, DVT prophylaxis if appropriate per surgical team.   -  can call pulmonary consult while inpatient if needed     Preop risk assessment:  --- ARISCAT score 15 pts = low risk - 1.6% risk of in- hospital post-op pulmonary complications.   --- Per arozullah respiratory failure index - Estimated risk probability for postoperative respiratory failure 0.88%.

## 2023-11-17 NOTE — PATIENT INSTRUCTIONS
1. Bariatric surgery clearance: no pulmonary symptoms   - will get CXR today     2. Snoring:   - make appt with sleep medicine     Thank you for visiting the Pulmonary clinic today!   Return to clinic - as needed   Marley Hayward CNP  My office number is [phone]  Jaki is my  and Nicole is my nurse.   Radiology scheduling [phone]   Appointment scheduling [phone]

## 2023-11-28 ENCOUNTER — HOSPITAL ENCOUNTER (OUTPATIENT)
Dept: CARDIOLOGY | Facility: CLINIC | Age: 43
Discharge: HOME | End: 2023-11-28
Payer: COMMERCIAL

## 2023-11-28 DIAGNOSIS — I10 PRIMARY HYPERTENSION: ICD-10-CM

## 2023-11-28 DIAGNOSIS — Z98.84 BARIATRIC SURGERY STATUS: ICD-10-CM

## 2023-11-28 DIAGNOSIS — I10 HYPERTENSION, UNSPECIFIED TYPE: Primary | ICD-10-CM

## 2023-11-28 PROCEDURE — 2500000004 HC RX 250 GENERAL PHARMACY W/ HCPCS (ALT 636 FOR OP/ED): Performed by: NURSE PRACTITIONER

## 2023-11-28 PROCEDURE — 93306 TTE W/DOPPLER COMPLETE: CPT

## 2023-11-28 PROCEDURE — 93306 TTE W/DOPPLER COMPLETE: CPT | Performed by: INTERNAL MEDICINE

## 2023-11-28 RX ADMIN — PERFLUTREN 3 ML OF DILUTION: 6.52 INJECTION, SUSPENSION INTRAVENOUS at 11:38

## 2023-12-02 LAB
AORTIC VALVE PEAK VELOCITY: 1.3
AV PEAK GRADIENT: 6.7
AVA (PEAK VEL): 2.93
EJECTION FRACTION APICAL 4 CHAMBER: 50.5
EJECTION FRACTION: 49
LEFT ATRIUM VOLUME AREA LENGTH INDEX BSA: 26.6
LEFT VENTRICLE INTERNAL DIMENSION DIASTOLE: 5.19 (ref 3.5–6)
LEFT VENTRICULAR OUTFLOW TRACT DIAMETER: 2.1
MITRAL VALVE E/A RATIO: 0.97
MITRAL VALVE E/E' RATIO: 10.3
RIGHT VENTRICLE FREE WALL PEAK S': 12
RIGHT VENTRICLE PEAK SYSTOLIC PRESSURE: 36.2
TRICUSPID ANNULAR PLANE SYSTOLIC EXCURSION: 2.6

## 2023-12-06 ENCOUNTER — OFFICE VISIT (OUTPATIENT)
Dept: SURGERY | Facility: CLINIC | Age: 43
End: 2023-12-06
Payer: COMMERCIAL

## 2023-12-06 ENCOUNTER — NUTRITION (OUTPATIENT)
Dept: SURGERY | Facility: CLINIC | Age: 43
End: 2023-12-06
Payer: COMMERCIAL

## 2023-12-06 ENCOUNTER — HOSPITAL ENCOUNTER (OUTPATIENT)
Dept: VASCULAR MEDICINE | Facility: HOSPITAL | Age: 43
Discharge: HOME | End: 2023-12-06
Payer: COMMERCIAL

## 2023-12-06 VITALS — WEIGHT: 293 LBS | BODY MASS INDEX: 45.99 KG/M2 | HEIGHT: 67 IN

## 2023-12-06 VITALS
DIASTOLIC BLOOD PRESSURE: 86 MMHG | BODY MASS INDEX: 45.99 KG/M2 | HEIGHT: 67 IN | HEART RATE: 72 BPM | OXYGEN SATURATION: 99 % | WEIGHT: 293 LBS | SYSTOLIC BLOOD PRESSURE: 156 MMHG

## 2023-12-06 DIAGNOSIS — R22.42 LOCALIZED SWELLING OF LEFT LOWER LEG: ICD-10-CM

## 2023-12-06 DIAGNOSIS — K21.9 GASTROESOPHAGEAL REFLUX DISEASE, UNSPECIFIED WHETHER ESOPHAGITIS PRESENT: ICD-10-CM

## 2023-12-06 DIAGNOSIS — E66.01 CLASS 3 SEVERE OBESITY DUE TO EXCESS CALORIES WITHOUT SERIOUS COMORBIDITY WITH BODY MASS INDEX (BMI) OF 50.0 TO 59.9 IN ADULT (MULTI): Primary | ICD-10-CM

## 2023-12-06 DIAGNOSIS — I10 BENIGN HYPERTENSION: ICD-10-CM

## 2023-12-06 DIAGNOSIS — M16.0 PRIMARY OSTEOARTHRITIS OF BOTH HIPS: ICD-10-CM

## 2023-12-06 DIAGNOSIS — Z91.89 AT RISK FOR OBSTRUCTIVE SLEEP APNEA: ICD-10-CM

## 2023-12-06 PROBLEM — E11.9 DIABETES MELLITUS (MULTI): Status: RESOLVED | Noted: 2022-07-22 | Resolved: 2023-12-06

## 2023-12-06 PROBLEM — K42.9 UMBILICAL HERNIA WITHOUT OBSTRUCTION AND WITHOUT GANGRENE: Status: ACTIVE | Noted: 2023-12-06

## 2023-12-06 PROCEDURE — 1036F TOBACCO NON-USER: CPT | Performed by: SURGERY

## 2023-12-06 PROCEDURE — 99205 OFFICE O/P NEW HI 60 MIN: CPT | Performed by: SURGERY

## 2023-12-06 PROCEDURE — 93971 EXTREMITY STUDY: CPT | Performed by: INTERNAL MEDICINE

## 2023-12-06 PROCEDURE — 93971 EXTREMITY STUDY: CPT

## 2023-12-06 PROCEDURE — 3008F BODY MASS INDEX DOCD: CPT | Performed by: SURGERY

## 2023-12-06 PROCEDURE — 3077F SYST BP >= 140 MM HG: CPT | Performed by: SURGERY

## 2023-12-06 PROCEDURE — 3079F DIAST BP 80-89 MM HG: CPT | Performed by: SURGERY

## 2023-12-06 NOTE — H&P (VIEW-ONLY)
BARIATRIC SURGERY NEW PATIENT CONSULTATION  HISTORY AND PHYSICAL      Date: 12/6/2023 Time: 1:27 PM  Name: Shoneia M Collins  MRN: [id number]    This is a 43 y.o. female with morbid obesity (Body mass index is 53.09 kg/m².) who presents to clinic for consideration of bariatric surgery. she has attempted and failed multiple diet and exercise regimens for weight loss.     PMH:   Benign hypertension  Diet controlled    Class 3 severe obesity due to excess calories without serious comorbidity with body mass index (BMI) of 50.0 to 59.9 in adult (CMS/[facility])  BMI 53    GERD (gastroesophageal reflux disease)  Laryngeal  Takes no meds.    Primary osteoarthritis of both hips  Severe on right. Moderate on left.    Had a PSG 3 years ago when she was much heavier. She does not know the results. Her snoring has stopped since she had her tonsils out and lost 130 lbs.    PSH: lap jairo, lap hysterectomy.    Denies smoking/vaping/regular EtOH/drug use. She does smoke marijuana.   STOPBANG 4 (+ obesity, HTN, neck circum, daytime sleepiness).   No personal/family hx of VTE.    The risks of sleeve gastrectomy, Padmini-en-Y gastric bypass, and duodenal switch surgery including bleeding, leak, wound infection, dehydration, ulcers, internal hernia, DVT/PE, prolonged nausea/vomiting, incomplete resolution of associated medical conditions, reflux, weight regain, vitamin/mineral deficiencies, and death have been explained to the patient and Shoneia M Collins has expressed understanding and acceptance of them.     The increased risk of substance and alcohol abuse following bariatric surgery was discussed with the patient, along with the negative consequences of substance/alcohol use after surgery including addiction, worsening of mental health disorders, and injury to the stomach. The risk of smoking and vaping (tobacco or any other substance) after bariatric surgery was explained to the patient. This includes risk of anastamotic ulcers, gastritis,  bleeding, perforation, stricture, and PO intolerance.  The patient expressed understanding and acceptance of these risks.    The patient was advised not to become pregnant within 12-18 months following bariatric surgery. She was educated on the increased risks to mother and fetus associated with pregnancy within 2 years of bariatric surgery. She has had a hysterectomy.    The benefits of the above surgeries including weight loss, improvement/resolution of associated medical and mental health conditions, improved mobility, and decreased mortality have been explained the the patient and Shoneia M Collins has expressed understanding and acceptance of them.    PAST MEDICAL HISTORY:  Problem List Items Addressed This Visit          Cardiac and Vasculature    Benign hypertension - Primary    Current Assessment & Plan     Diet controlled            Endocrine/Metabolic    Class 3 severe obesity due to excess calories without serious comorbidity with body mass index (BMI) of 50.0 to 59.9 in adult (CMS/[facility])    Current Assessment & Plan     BMI 53            Gastrointestinal and Abdominal    GERD (gastroesophageal reflux disease)    Current Assessment & Plan     Laryngeal  Takes no meds.            Musculoskeletal and Injuries    Primary osteoarthritis of both hips    Current Assessment & Plan     Severe on right. Moderate on left.              PAST SURGICAL HISTORY:  Past Surgical History:   Procedure Laterality Date     SECTION, CLASSIC  11/10/2014     Section    GALLBLADDER SURGERY  11/10/2014    Gallbladder Surgery    OTHER SURGICAL HISTORY  2021    Laparoscopic hysterectomy    OTHER SURGICAL HISTORY  2021    Bilateral salpingectomy    OTHER SURGICAL HISTORY  2021    Cystoscopy    TUBAL LIGATION  2017    Tubal Ligation       FAMILY HISTORY:  Family History   Problem Relation Name Age of Onset    Lung disease Mother      Diabetes Paternal Grandmother      Diabetes Other           SOCIAL HISTORY:  Social History     Tobacco Use    Smoking status: Former     Types: Cigarettes     Quit date:      Years since quittin.9     Passive exposure: Never    Smokeless tobacco: Never   Vaping Use    Vaping Use: Never used   Substance Use Topics    Alcohol use: Yes     Comment: occassionally    Drug use: Yes     Types: Marijuana       MEDICATIONS:  Prior to Admission Medications:  Current Outpatient Medications   Medication Sig Dispense Refill    acetaminophen (Tylenol) 500 mg tablet Take 1 tablet (500 mg) by mouth every 6 hours if needed for mild pain (1 - 3).      chlorhexidine (Hibiclens) 4 % external liquid Apply topically.      clindamycin (Cleocin T) 1 % lotion Apply topically.      cyanocobalamin (Vitamin B-12) 500 mcg tablet Take 1 tablet (500 mcg) by mouth once daily.      ferrous sulfate 325 (65 Fe) MG tablet Take 1 tablet (325 mg) by mouth once daily. 90 tablet 3    fluticasone (Flonase) 50 mcg/actuation nasal spray Administer into affected nostril(s).      folic acid (Folvite) 1 mg tablet Take by mouth.      ketoconazole (NIZOral) 2 % cream Apply topically 2 times a day.      loratadine (Claritin Reditabs) 10 mg disintegrating tablet Take 1 tablet (10 mg) by mouth once daily.      meloxicam (Mobic) 7.5 mg tablet Take 1 tablet (7.5 mg) by mouth once daily. 30 tablet 11    tiZANidine (Zanaflex) 4 mg capsule Take 1 capsule (4 mg) by mouth once daily at bedtime. 30 capsule 11    DULoxetine (Cymbalta) 30 mg DR capsule Take 1 capsule (30 mg) by mouth 2 times a day. Do not crush or chew. 60 capsule 0    DULoxetine (Cymbalta) 60 mg DR capsule Take 1 capsule (60 mg) by mouth once daily in the evening. Do not crush or chew.       No current facility-administered medications for this visit.       ALLERGIES:  Allergies   Allergen Reactions    Pollen Extracts Runny nose       REVIEW OF SYSTEMS:  GENERAL: Negative for malaise, significant weight loss and fever  HEAD: Negative for headache,  swelling.  NECK: Negative for lumps, goiter, pain and significant neck swelling  RESPIRATORY: Negative for cough, wheezing or shortness of breath.  CARDIOVASCULAR: Negative for chest pain, leg swelling or palpitations.  GI: Negative for abdominal discomfort, blood in stools or black stools or change in bowel habits  : No history of dysuria, frequency or incontinence  MUSCULOSKELETAL: Negative for joint pain or swelling, back pain or muscle pain.  SKIN: Negative for lesions, rash, and itching.  PSYCH: Negative for sleep disturbance, mood disorder and recent psychosocial stressors.  ENDOCRINE: Negative for cold or heat intolerance, polyuria, polydipsia and goiter.    PHYSICAL EXAM:  Vitals:    12/06/23 1307   BP: 156/86   Pulse: 72   SpO2: 99%     General appearance: obese, NAD  Neuro: AOx3  Head: EOMI; no swelling or lesions of scalp or face  ENT:  no lumps or lymphadenopathy, thyroid normal to palpation; oropharynx clear, no swelling or erythema  Skin: warm, no erythema or rashes  Lungs: clear to percussion and auscultation  Heart: regular rhythm and S1, S2 normal  Abdomen: soft, non-tender, no masses, no organomegaly  Extremities: Normal exam of the extremities. No swelling or pain.  Psych: no hurried speech, no flight of ideas, normal affect    IMPRESSION:  Shoneia M Collins is a 43 y.o. female with the following diagnosis and co-morbidities:  Body mass index is 53.09 kg/m².   Patient Active Problem List   Diagnosis    Anemia    Anosmia    Anovular menstruation    Arthritis    Benign hypertension    Candidal vulvovaginitis    Cervical lymphadenopathy    Snoring    Dysmenorrhea    Fatigue    GERD (gastroesophageal reflux disease)    Hidradenitis suppurativa    Class 3 severe obesity due to excess calories without serious comorbidity with body mass index (BMI) of 50.0 to 59.9 in adult (CMS/[facility])    Mass of breast, right    Menorrhagia    Primary osteoarthritis of both hips    Polyphagia    Sleep apnea    Tinea  corporis    Varicose veins of leg with edema, bilateral    Vitamin B12 deficiency    Vitamin D deficiency    Thrombophlebitis    S/P GAVIN (total abdominal hysterectomy)    Left lower quadrant abdominal pain    Back pain    Hemorrhage of varicose veins of left lower extremity    Hyperglycemia    Chronic left shoulder pain    Lingual tonsil hypertrophy    Smell and taste disorder    Subacromial bursitis of left shoulder joint    Varicose veins with pain    Inadequate caloric intake    Inadequate dietary caloric intake    Bariatric surgery status     Diagnosis noted as above, no additional diagnosis at this time.  This patient does meet the criteria for a surgical weight loss procedure according to NIH guidelines.    PLAN:  The plan of treatment for Shoneia M Collins is to continue with the consultations and tests ordered today in hopes of qualifying for pre-operative clearance for bariatric surgery. This includes:    Consult Nutrition for education and 0 mo SWL  Consult Psychology  Consult cardiology  Consult pulmonology  Labs ordered  EGD  PCP for medical optimization  Consult sleep medicine - concern for MUMTAZ  Counseled on preop weight loss goal of at least 10 lbs    Patient is interested in: RYGB    45 minutes were spent with patient including history, physical exam, and education.    Luis Armando Benito MD  Bariatric and Minimally Invasive General Surgery

## 2023-12-06 NOTE — PROGRESS NOTES
"Initial Bariatric Nutrition Assessment    Surgeon:   Thong  Patient is considering: RNYGB    ASSESSMENT:  Current weight:   Vitals:    12/06/23 1344   Weight: (!) 154 kg (339 lb)     Ht:  1.702 m (5' 7\")   BMI:  Body mass index is 53.09 kg/m².  Pre-Op Excess Body Weight (EBW):   180lbs    Target Post-Op weight goal:    195-231lbs     This is a 43 y.o. female with morbid obesity (Body mass index is 53.09 kg/m².) who presents to clinic for consideration of bariatric surgery. she has attempted and failed multiple diet and exercise regimens for weight loss.   Initial Onset of obesity was in childhood.  Their goal for surgery is to  be healthier  and to reduce pain and to lose weight to qualify for a hip replacement. The patient has tried multiple diets to lose weight including  vegan/vegetarian, fruit diet, exercise, Adipex and IF . The patient did both of these diets and this was very successful. The most pounds lost on this diet were 130 lbs. The patient considers their dietary weakness to be sweets The patient reports a  highest weight ever of 492 pounds and is unsure of her lowest weight  Current diet: regular  The patient exercises 1-2 times /week  20 Minutes/Day Types of Exercise : hand weights  Has arthritis in her leg and has a lot of pain.     Food allergies/intolerances:  milk and cream intolerance   Chewing/Swallowing/Dentition: no  Nausea / Vomiting / Hx Gastroparesis:   n/v w/ diverticulitis   Diarrhea/ Constipation: no  Smoking/Tobacco use: no  Vitamins/Minerals supplements: B12, iron, folate   Hours of sleep/night: 8 hours  Work: [employer]- grabs things on the go     Medications:     Current Outpatient Medications:     acetaminophen (Tylenol) 500 mg tablet, Take 1 tablet (500 mg) by mouth every 6 hours if needed for mild pain (1 - 3)., Disp: , Rfl:     chlorhexidine (Hibiclens) 4 % external liquid, Apply topically., Disp: , Rfl:     clindamycin (Cleocin T) 1 % lotion, Apply topically., Disp: , Rfl:     " cyanocobalamin (Vitamin B-12) 500 mcg tablet, Take 1 tablet (500 mcg) by mouth once daily., Disp: , Rfl:     DULoxetine (Cymbalta) 30 mg DR capsule, Take 1 capsule (30 mg) by mouth 2 times a day. Do not crush or chew., Disp: 60 capsule, Rfl: 0    DULoxetine (Cymbalta) 60 mg DR capsule, Take 1 capsule (60 mg) by mouth once daily in the evening. Do not crush or chew., Disp: , Rfl:     ferrous sulfate 325 (65 Fe) MG tablet, Take 1 tablet (325 mg) by mouth once daily., Disp: 90 tablet, Rfl: 3    fluticasone (Flonase) 50 mcg/actuation nasal spray, Administer into affected nostril(s)., Disp: , Rfl:     folic acid (Folvite) 1 mg tablet, Take by mouth., Disp: , Rfl:     ketoconazole (NIZOral) 2 % cream, Apply topically 2 times a day., Disp: , Rfl:     loratadine (Claritin Reditabs) 10 mg disintegrating tablet, Take 1 tablet (10 mg) by mouth once daily., Disp: , Rfl:     meloxicam (Mobic) 7.5 mg tablet, Take 1 tablet (7.5 mg) by mouth once daily., Disp: 30 tablet, Rfl: 11    tiZANidine (Zanaflex) 4 mg capsule, Take 1 capsule (4 mg) by mouth once daily at bedtime., Disp: 30 capsule, Rfl: 11      24 HOUR RECALL/DIET HISTORY:  Breakfast:  skip  Snack:    Lunch: salad and 2 tacos  Snack:   Dinner: spaghetti and fried chicken leg  Snack: kit destini   Beverages: coffee- latte or espresso w/milk from [employer], 12oz, 48oz of water  Alcohol: 2-3x per week (3 shots)    Person responsible for cooking & shopping?   self  How often do you eat sweet snacks?   1-3x per week  How often do you eat savory snacks?  2-4x per month  How often do you eat out?  3-4x per week   Do you feel overly stuffed?   no  Binge Eating?  no  Night Eating?  no  Emotional Eating?  Yes- has been stressed and has been wanting to eat sweets at night        READINESS TO LEARN:  Motivation to learn: Interested        Understanding of instruction: Good      Anticipated Compliance: Good       Family Support: yes          Educational Materials Provided:    Schedules for  MSWL class and support group   1500  Calorie meal plan   Goals sheet  Chair exercises    Nutrition assessment completed today.  Pt will be scheduled for video education class to discuss the 2 week pre op diet, post op protein and fluid goals, vitamin and mineral supplementation, exercise goals, and post op diet progression closer to the time of surgery.     Patient is seeking RNYGB    Instructed pt on a 1500 calorie meal plan and to measure and record intake daily. Has started meal prepping to help her set up her day. Advised to eat 3 meals per and 1-2 high protein snacks. Discussed ideas to help prevent her from eating out during the day. Recommend eating 4-5 oz per meal. Reviewed the postop behaviors to start practicing. Discussed how to reduce emotional eating. Set a goal to start doing exercise of choices for  3x per week for 20 minutes.     Patient was receptive to nutritional recommendations, asked numerous questions, and verbalized understanding of the weight loss surgery diet.  Patient expressed understanding about the importance of strict dietary compliance post-surgery to avoid nutritional deficiencies and achieve optimal weight loss and verbalized intent to follow dietary recommendations.    Malnutrition Screening:   Significant unintentional weight loss? n/a   Eating less than 75% of usual intake for more than 2 weeks? n/a      Nutrition Diagnosis:   Overweight/obesity related to excess energy intake as evidenced by BMI >= 40 kg/m^2.  Food- and nutrition-related knowledge deficit related to lack of prior exposure to surgical weight loss information as evidenced by pt new to surgical program.    Nutrition Interventions:   Modify type and amount of food and nutrients within meals and snacks.  Comprehensive Nutrition Education    Recommendations:  1. Begin following your meal plan.  Measure and record intake daily.   2. Structure meal patterns, eating three meals and 1-2 snacks per day.   3. Aim for 4-5 oz  protein per meal.  Have 1-2 high protein snacks that are 10-20 g protein each.  You can try a tuna or chicken packet, Greek yogurt, 2 string cheeses, Protein bars like Quest, Pure Protein, Premier, or Built Bars. you can also try protein chips form Quest or Atkins.    4. Drink 64oz of calorie-free, caffeine-free, and non-carbonated beverages. Practice taking sips.   5. Practice no drinking 30 minutes before meals, nothing with meals and wait 30 minutes after meals to drink again. Make meals last 30 minutes-chew thoroughly.   6. Limit or omit eating out/sweets/savory snacks to 1-2 times per week. Bring a packed lunch with you when you are working.   7. Begin daily multivitamin.  [employer] Complete has everything you need.  8. Continue hand weight exercises. Consider adding walking and/or chair exercises. Aim to do it 3x per week for 20 minutes. Increase physical activity by 10-15 minutes as tolerated to an end goal of 60 minutes 5 x per week. Consistency is the key.  9. Identify emotional eating. Only snack when you feel hungry.       Pre-op Goal weight: lose 5% of body weight    Nutrition Monitoring and Evaluation: 1-2 pound weight loss per week  Criteria: weight check  Need for Follow-up: one month     Patient does meet [employer] guidelines for weight loss surgery, however needs to demonstrate consistent effort in making dietary changes before giving clearance. It is anticipated that the patient will need at least 1-2 nutritional follow-up visits prior to clearance for surgery.    Leela Chatterjee MS, RD, LD  Phone: [phone]

## 2023-12-06 NOTE — PROGRESS NOTES
BARIATRIC SURGERY NEW PATIENT CONSULTATION  HISTORY AND PHYSICAL      Date: 12/6/2023 Time: 1:27 PM  Name: Shoneia M Collins  MRN: [id number]    This is a 43 y.o. female with morbid obesity (Body mass index is 53.09 kg/m².) who presents to clinic for consideration of bariatric surgery. she has attempted and failed multiple diet and exercise regimens for weight loss.     PMH:   Benign hypertension  Diet controlled    Class 3 severe obesity due to excess calories without serious comorbidity with body mass index (BMI) of 50.0 to 59.9 in adult (CMS/[facility])  BMI 53    GERD (gastroesophageal reflux disease)  Laryngeal  Takes no meds.    Primary osteoarthritis of both hips  Severe on right. Moderate on left.    Had a PSG 3 years ago when she was much heavier. She does not know the results. Her snoring has stopped since she had her tonsils out and lost 130 lbs.    PSH: lap jairo, lap hysterectomy.    Denies smoking/vaping/regular EtOH/drug use. She does smoke marijuana.   STOPBANG 4 (+ obesity, HTN, neck circum, daytime sleepiness).   No personal/family hx of VTE.    The risks of sleeve gastrectomy, Padmini-en-Y gastric bypass, and duodenal switch surgery including bleeding, leak, wound infection, dehydration, ulcers, internal hernia, DVT/PE, prolonged nausea/vomiting, incomplete resolution of associated medical conditions, reflux, weight regain, vitamin/mineral deficiencies, and death have been explained to the patient and Shoneia M Collins has expressed understanding and acceptance of them.     The increased risk of substance and alcohol abuse following bariatric surgery was discussed with the patient, along with the negative consequences of substance/alcohol use after surgery including addiction, worsening of mental health disorders, and injury to the stomach. The risk of smoking and vaping (tobacco or any other substance) after bariatric surgery was explained to the patient. This includes risk of anastamotic ulcers, gastritis,  bleeding, perforation, stricture, and PO intolerance.  The patient expressed understanding and acceptance of these risks.    The patient was advised not to become pregnant within 12-18 months following bariatric surgery. She was educated on the increased risks to mother and fetus associated with pregnancy within 2 years of bariatric surgery. She has had a hysterectomy.    The benefits of the above surgeries including weight loss, improvement/resolution of associated medical and mental health conditions, improved mobility, and decreased mortality have been explained the the patient and Shoneia M Collins has expressed understanding and acceptance of them.    PAST MEDICAL HISTORY:  Problem List Items Addressed This Visit          Cardiac and Vasculature    Benign hypertension - Primary    Current Assessment & Plan     Diet controlled            Endocrine/Metabolic    Class 3 severe obesity due to excess calories without serious comorbidity with body mass index (BMI) of 50.0 to 59.9 in adult (CMS/[facility])    Current Assessment & Plan     BMI 53            Gastrointestinal and Abdominal    GERD (gastroesophageal reflux disease)    Current Assessment & Plan     Laryngeal  Takes no meds.            Musculoskeletal and Injuries    Primary osteoarthritis of both hips    Current Assessment & Plan     Severe on right. Moderate on left.              PAST SURGICAL HISTORY:  Past Surgical History:   Procedure Laterality Date     SECTION, CLASSIC  11/10/2014     Section    GALLBLADDER SURGERY  11/10/2014    Gallbladder Surgery    OTHER SURGICAL HISTORY  2021    Laparoscopic hysterectomy    OTHER SURGICAL HISTORY  2021    Bilateral salpingectomy    OTHER SURGICAL HISTORY  2021    Cystoscopy    TUBAL LIGATION  2017    Tubal Ligation       FAMILY HISTORY:  Family History   Problem Relation Name Age of Onset    Lung disease Mother      Diabetes Paternal Grandmother      Diabetes Other           SOCIAL HISTORY:  Social History     Tobacco Use    Smoking status: Former     Types: Cigarettes     Quit date:      Years since quittin.9     Passive exposure: Never    Smokeless tobacco: Never   Vaping Use    Vaping Use: Never used   Substance Use Topics    Alcohol use: Yes     Comment: occassionally    Drug use: Yes     Types: Marijuana       MEDICATIONS:  Prior to Admission Medications:  Current Outpatient Medications   Medication Sig Dispense Refill    acetaminophen (Tylenol) 500 mg tablet Take 1 tablet (500 mg) by mouth every 6 hours if needed for mild pain (1 - 3).      chlorhexidine (Hibiclens) 4 % external liquid Apply topically.      clindamycin (Cleocin T) 1 % lotion Apply topically.      cyanocobalamin (Vitamin B-12) 500 mcg tablet Take 1 tablet (500 mcg) by mouth once daily.      ferrous sulfate 325 (65 Fe) MG tablet Take 1 tablet (325 mg) by mouth once daily. 90 tablet 3    fluticasone (Flonase) 50 mcg/actuation nasal spray Administer into affected nostril(s).      folic acid (Folvite) 1 mg tablet Take by mouth.      ketoconazole (NIZOral) 2 % cream Apply topically 2 times a day.      loratadine (Claritin Reditabs) 10 mg disintegrating tablet Take 1 tablet (10 mg) by mouth once daily.      meloxicam (Mobic) 7.5 mg tablet Take 1 tablet (7.5 mg) by mouth once daily. 30 tablet 11    tiZANidine (Zanaflex) 4 mg capsule Take 1 capsule (4 mg) by mouth once daily at bedtime. 30 capsule 11    DULoxetine (Cymbalta) 30 mg DR capsule Take 1 capsule (30 mg) by mouth 2 times a day. Do not crush or chew. 60 capsule 0    DULoxetine (Cymbalta) 60 mg DR capsule Take 1 capsule (60 mg) by mouth once daily in the evening. Do not crush or chew.       No current facility-administered medications for this visit.       ALLERGIES:  Allergies   Allergen Reactions    Pollen Extracts Runny nose       REVIEW OF SYSTEMS:  GENERAL: Negative for malaise, significant weight loss and fever  HEAD: Negative for headache,  swelling.  NECK: Negative for lumps, goiter, pain and significant neck swelling  RESPIRATORY: Negative for cough, wheezing or shortness of breath.  CARDIOVASCULAR: Negative for chest pain, leg swelling or palpitations.  GI: Negative for abdominal discomfort, blood in stools or black stools or change in bowel habits  : No history of dysuria, frequency or incontinence  MUSCULOSKELETAL: Negative for joint pain or swelling, back pain or muscle pain.  SKIN: Negative for lesions, rash, and itching.  PSYCH: Negative for sleep disturbance, mood disorder and recent psychosocial stressors.  ENDOCRINE: Negative for cold or heat intolerance, polyuria, polydipsia and goiter.    PHYSICAL EXAM:  Vitals:    12/06/23 1307   BP: 156/86   Pulse: 72   SpO2: 99%     General appearance: obese, NAD  Neuro: AOx3  Head: EOMI; no swelling or lesions of scalp or face  ENT:  no lumps or lymphadenopathy, thyroid normal to palpation; oropharynx clear, no swelling or erythema  Skin: warm, no erythema or rashes  Lungs: clear to percussion and auscultation  Heart: regular rhythm and S1, S2 normal  Abdomen: soft, non-tender, no masses, no organomegaly  Extremities: Normal exam of the extremities. No swelling or pain.  Psych: no hurried speech, no flight of ideas, normal affect    IMPRESSION:  Shoneia M Collins is a 43 y.o. female with the following diagnosis and co-morbidities:  Body mass index is 53.09 kg/m².   Patient Active Problem List   Diagnosis    Anemia    Anosmia    Anovular menstruation    Arthritis    Benign hypertension    Candidal vulvovaginitis    Cervical lymphadenopathy    Snoring    Dysmenorrhea    Fatigue    GERD (gastroesophageal reflux disease)    Hidradenitis suppurativa    Class 3 severe obesity due to excess calories without serious comorbidity with body mass index (BMI) of 50.0 to 59.9 in adult (CMS/[facility])    Mass of breast, right    Menorrhagia    Primary osteoarthritis of both hips    Polyphagia    Sleep apnea    Tinea  corporis    Varicose veins of leg with edema, bilateral    Vitamin B12 deficiency    Vitamin D deficiency    Thrombophlebitis    S/P GAVIN (total abdominal hysterectomy)    Left lower quadrant abdominal pain    Back pain    Hemorrhage of varicose veins of left lower extremity    Hyperglycemia    Chronic left shoulder pain    Lingual tonsil hypertrophy    Smell and taste disorder    Subacromial bursitis of left shoulder joint    Varicose veins with pain    Inadequate caloric intake    Inadequate dietary caloric intake    Bariatric surgery status     Diagnosis noted as above, no additional diagnosis at this time.  This patient does meet the criteria for a surgical weight loss procedure according to NIH guidelines.    PLAN:  The plan of treatment for Shoneia M Collins is to continue with the consultations and tests ordered today in hopes of qualifying for pre-operative clearance for bariatric surgery. This includes:    Consult Nutrition for education and 0 mo SWL  Consult Psychology  Consult cardiology  Consult pulmonology  Labs ordered  EGD  PCP for medical optimization  Consult sleep medicine - concern for MUMTAZ  Counseled on preop weight loss goal of at least 10 lbs    Patient is interested in: RYGB    45 minutes were spent with patient including history, physical exam, and education.    Luis Armando Benito MD  Bariatric and Minimally Invasive General Surgery

## 2023-12-08 ENCOUNTER — TELEPHONE (OUTPATIENT)
Dept: VASCULAR SURGERY | Facility: HOSPITAL | Age: 43
End: 2023-12-08
Payer: COMMERCIAL

## 2023-12-08 ENCOUNTER — TELEPHONE (OUTPATIENT)
Dept: SURGERY | Facility: CLINIC | Age: 43
End: 2023-12-08
Payer: COMMERCIAL

## 2023-12-08 DIAGNOSIS — I80.9 SUPERFICIAL PHLEBITIS: Primary | ICD-10-CM

## 2023-12-08 NOTE — TELEPHONE ENCOUNTER
Discussed her DVT study report on the phone. No evidence of DVT. + phlebitis in a varicose vein. You can apply warm compress to phlebitis, wear compression stocking daily and prn tylenol or Advil for pain.

## 2023-12-08 NOTE — TELEPHONE ENCOUNTER
Dr. Benito reviewed patient's left lower extremity venous duplex results and educated RN that there does not appear to be any deep vein thrombosis, but there does appear to be blood clot in varicose veins. Dr. Benito requested that RN call patient have her make an appointment with her vascular surgeon as soon as possible. This RN called and educated patient on what Dr. Benito had told RN. Patient verbalized understanding and stated that she would call her vascular surgeon today. Educated patient to call with any questions or concerns.

## 2023-12-11 ENCOUNTER — OFFICE VISIT (OUTPATIENT)
Dept: VASCULAR SURGERY | Facility: HOSPITAL | Age: 43
End: 2023-12-11
Payer: COMMERCIAL

## 2023-12-11 VITALS
BODY MASS INDEX: 45.99 KG/M2 | HEIGHT: 67 IN | WEIGHT: 293 LBS | DIASTOLIC BLOOD PRESSURE: 98 MMHG | OXYGEN SATURATION: 100 % | SYSTOLIC BLOOD PRESSURE: 152 MMHG | RESPIRATION RATE: 20 BRPM | HEART RATE: 69 BPM

## 2023-12-11 DIAGNOSIS — I83.893 VARICOSE VEINS OF LEG WITH EDEMA, BILATERAL: Primary | ICD-10-CM

## 2023-12-11 DIAGNOSIS — I80.9 SUPERFICIAL PHLEBITIS: ICD-10-CM

## 2023-12-11 PROCEDURE — 3080F DIAST BP >= 90 MM HG: CPT | Performed by: CLINICAL NURSE SPECIALIST

## 2023-12-11 PROCEDURE — 3077F SYST BP >= 140 MM HG: CPT | Performed by: CLINICAL NURSE SPECIALIST

## 2023-12-11 PROCEDURE — 1036F TOBACCO NON-USER: CPT | Performed by: CLINICAL NURSE SPECIALIST

## 2023-12-11 PROCEDURE — 99214 OFFICE O/P EST MOD 30 MIN: CPT | Performed by: CLINICAL NURSE SPECIALIST

## 2023-12-11 PROCEDURE — 3008F BODY MASS INDEX DOCD: CPT | Performed by: CLINICAL NURSE SPECIALIST

## 2023-12-11 ASSESSMENT — ENCOUNTER SYMPTOMS
OCCASIONAL FEELINGS OF UNSTEADINESS: 0
DEPRESSION: 0
LOSS OF SENSATION IN FEET: 0

## 2023-12-11 ASSESSMENT — VISUAL ACUITY: OU: 1

## 2023-12-11 NOTE — PATIENT INSTRUCTIONS
It was a pleasure to see you today.  Your superficial left posterior calf phlebitis seems to have been improving.  Your venous test from 2022 confirms you have bilateral lower extremity venous insufficiency.  Patient is lost 130 pounds and she is scheduled for show insurance approval for bariatric surgery in the upcoming months.  Lets schedule appointment with Dr. Mcgrath around March or April with a repeat OSITO study then she discussed how to treat your varicose veins.  In the meantime to start wearing medical compression stockings daily and remove anything  If you try the pantyhose or even the medical impression leggins  Please call [phone] if you should need anything.   Please call the office with any questions at [phone].   You can speak directly to my Vein Center Nurse Coordinator, Divya, for specific questions.   You can also email: [email or web address]  If you need coordinating your appointments and testing you can do these

## 2023-12-11 NOTE — PROGRESS NOTES
Vascular Surgery Consultation, History, Physical    Shoneia M Collins is a 43 y.o. year old female patient who returns to clinic for evaluation of left lower leg superficial phlebitis.  Patient tells me approximately a couple weeks ago she developed severe left leg swelling and pain behind her left calf.  She did have an ultrasound confirms no DVT but does have a superficial phlebitis of her calf vein.  Patient denies any chest pain, shortness of breath, fever or chills.  Patient has lost 130 pounds on her own and she is scheduled to have her bariatric surgery soon.  History:   2022  Varicose veins on bilateral lower bilateral leg swelling, and itching.  + prior left lateral knee vein bleed  wearing medical compression stockings without relief of symptoms  VVI study confirms bilateral GSV reflux   I would like to schedule a virtual visit with Dr. Mcgrath to discuss venous interventions  we discussed risk and benefits of surgical interventions.   C2 disease   Past Medical History:   Diagnosis Date    Benign hypertension 10/30/2018    Class 3 severe obesity due to excess calories without serious comorbidity with body mass index (BMI) of 50.0 to 59.9 in adult (CMS/[facility]) 08/02/2023    Diabetes mellitus (CMS/[facility]) 07/22/2022    GERD (gastroesophageal reflux disease) 08/02/2023    Gestational diabetes mellitus in pregnancy, unspecified control 07/13/2017    Gestational diabetes mellitus (GDM) in second trimester, gestational diabetes method of control unspecified    Hypertrophy of tonsils with hypertrophy of adenoids 11/24/2014    Tonsillar and adenoid hypertrophy    Personal history of gestational diabetes 07/13/2017    History of gestational diabetes mellitus (GDM)    Sleep apnea 08/02/2023    Snoring 11/10/2014    Snoring    Thrombophlebitis 08/02/2023    Varicose veins with pain 08/22/2023    Vitamin B12 deficiency 08/02/2023    Vitamin D deficiency 08/02/2023       Past Surgical History:   Procedure Laterality Date      SECTION, CLASSIC  11/10/2014     Section    GALLBLADDER SURGERY  11/10/2014    Gallbladder Surgery    OTHER SURGICAL HISTORY  2021    Laparoscopic hysterectomy    OTHER SURGICAL HISTORY  2021    Bilateral salpingectomy    OTHER SURGICAL HISTORY  2021    Cystoscopy    TUBAL LIGATION  2017    Tubal Ligation       Active Ambulatory Problems     Diagnosis Date Noted    Anemia 2023    Anosmia 2023    Anovular menstruation 2023    Arthritis 2023    Benign hypertension 10/30/2018    Candidal vulvovaginitis 2023    Cervical lymphadenopathy 2023    Snoring 2023    Dysmenorrhea 2023    Fatigue 2023    GERD (gastroesophageal reflux disease) 2023    Hidradenitis suppurativa 2023    Class 3 severe obesity due to excess calories without serious comorbidity with body mass index (BMI) of 50.0 to 59.9 in adult (CMS/[facility]) 2023    Mass of breast, right 2022    Menorrhagia 2023    Primary osteoarthritis of both hips 2022    Polyphagia 2023    Sleep apnea 2023    Tinea corporis 2023    Varicose veins of leg with edema, bilateral 2023    Vitamin B12 deficiency 2023    Vitamin D deficiency 2023    Thrombophlebitis 2023    S/P GAVIN (total abdominal hysterectomy) 2023    Left lower quadrant abdominal pain 2023    Back pain 2023    Hemorrhage of varicose veins of left lower extremity 2023    Hyperglycemia 2023    Chronic left shoulder pain 2023    Lingual tonsil hypertrophy 2023    Smell and taste disorder 2023    Subacromial bursitis of left shoulder joint 2023    Varicose veins with pain 2023    Inadequate caloric intake 10/07/2023    Inadequate dietary caloric intake 10/07/2023    Bariatric surgery status 10/07/2023    Umbilical hernia without obstruction and without gangrene 2023     Resolved Ambulatory  Problems     Diagnosis Date Noted    Diabetes mellitus (CMS/[facility]) 07/22/2022     Past Medical History:   Diagnosis Date    Gestational diabetes mellitus in pregnancy, unspecified control 07/13/2017    Hypertrophy of tonsils with hypertrophy of adenoids 11/24/2014    Personal history of gestational diabetes 07/13/2017       Review of Systems   All other systems reviewed and are negative.      Allergies   Allergen Reactions    Pollen Extracts Runny nose       Vitals:   Visit Vitals  BP (!) 152/98 (BP Location: Left arm, Patient Position: Sitting, BP Cuff Size: Adult)   Pulse 69   Resp 20     Physical Exam:   Vascular Physical Exam  Vitals and nursing note reviewed.   Constitutional:       General: She is awake.   Eyes:      General: Vision grossly intact.   Cardiovascular:      Rate and Rhythm: Normal rate and regular rhythm.      Pulses:           Carotid pulses are 2+ on the right side and 2+ on the left side.       Radial pulses are 2+ on the right side and 2+ on the left side.        Dorsalis pedis pulses are 2+ on the right side and 2+ on the left side.          Posterior tibial pulses are 2+ on the right side and 2+ on the left side.    Pulmonary:      Effort: Pulmonary effort is normal.      Breath sounds: Normal breath sounds and air entry.   Musculoskeletal:      Neck: Neck supple.   Skin:     General: Skin is warm and dry.      Comments: Positive large ropey varicosity left posterior calf and positive large left lateral leg varicosity as well.  Positive LDS skin changes bilateral lower extremities.   Neurological:      Mental Status: She is alert.     C2 disease   VCSS score right leg 10  VCSS score left leg 11                          Labs:  LABS:  CBC with Differential:    Lab Results   Component Value Date    WBC 6.6 08/06/2023    RBC 4.56 08/06/2023    HGB 11.5 (L) 08/06/2023    HCT 36.5 08/06/2023     08/06/2023    MCV 80 08/06/2023    MCHC 31.5 (L) 08/06/2023    RDW 16.2 (H) 08/06/2023    NRBC  "0.0 08/06/2023    LYMPHOPCT 31.5 08/06/2023    MONOPCT 8.7 08/06/2023    EOSPCT 1.1 08/06/2023    BASOPCT 0.3 08/06/2023    MONOSABS 0.57 08/06/2023    LYMPHSABS 2.07 08/06/2023    EOSABS 0.07 08/06/2023    BASOSABS 0.02 08/06/2023     CMP:    Lab Results   Component Value Date     08/06/2023    K 3.3 (L) 08/06/2023     08/06/2023    CO2 26 08/06/2023    BUN 8 08/06/2023    CREATININE 0.73 08/06/2023    GLUCOSE 88 08/06/2023    PROT 7.1 08/06/2023    CALCIUM 9.4 08/06/2023    BILITOT 0.8 08/06/2023    ALKPHOS 62 08/06/2023    AST 53 (H) 08/06/2023    ALT 22 08/06/2023     BMP:    Lab Results   Component Value Date     08/06/2023    K 3.3 (L) 08/06/2023     08/06/2023    CO2 26 08/06/2023    BUN 8 08/06/2023    CREATININE 0.73 08/06/2023    CALCIUM 9.4 08/06/2023    GLUCOSE 88 08/06/2023     Magnesium:No results found for: \"MG\"  Troponin:    Lab Results   Component Value Date    TROPHS 8 08/06/2023     BNP: No results found for: \"BNP\"    Lab Results   Component Value Date    CHOL 181 02/21/2023    LDLF 100 (H) 02/21/2023    HDL 64.8 02/21/2023       Imaging: VVI    Impression: Confirms bilateral GSV reflux    Plan:   It was a pleasure to see you today.  Your superficial left posterior calf phlebitis seems to have been improving.  Your venous test from 2022 confirms you have bilateral lower extremity venous insufficiency.  Patient is lost 130 pounds and she is scheduled for show insurance approval for bariatric surgery in the upcoming months.  Lets schedule appointment with Dr. Mcgrath around March or April with a repeat OSITO study then she discussed how to treat your varicose veins.  In the meantime to start wearing medical compression stockings daily and remove anything  If you try the pantyhose or even the medical impression leggins  Please call [phone] if you should need anything.   Please call the office with any questions at [phone].   You can speak directly to my Vein Center Nurse " Coordinator, Divya, for specific questions.   You can also email: [email or web address]  If you need coordinating your appointments and testing you can do these at the  or by calling my office shortly after your visit.

## 2023-12-12 ENCOUNTER — APPOINTMENT (OUTPATIENT)
Dept: SLEEP MEDICINE | Facility: CLINIC | Age: 43
End: 2023-12-12
Payer: COMMERCIAL

## 2023-12-19 ENCOUNTER — OFFICE VISIT (OUTPATIENT)
Dept: SLEEP MEDICINE | Facility: CLINIC | Age: 43
End: 2023-12-19
Payer: COMMERCIAL

## 2023-12-19 DIAGNOSIS — Z91.89 AT RISK FOR OBSTRUCTIVE SLEEP APNEA: ICD-10-CM

## 2023-12-19 DIAGNOSIS — I10 BENIGN HYPERTENSION: ICD-10-CM

## 2023-12-19 DIAGNOSIS — E66.01 CLASS 3 SEVERE OBESITY DUE TO EXCESS CALORIES WITHOUT SERIOUS COMORBIDITY WITH BODY MASS INDEX (BMI) OF 50.0 TO 59.9 IN ADULT (MULTI): ICD-10-CM

## 2023-12-19 PROCEDURE — 99213 OFFICE O/P EST LOW 20 MIN: CPT | Performed by: PHYSICIAN ASSISTANT

## 2023-12-19 PROCEDURE — 3008F BODY MASS INDEX DOCD: CPT | Performed by: PHYSICIAN ASSISTANT

## 2023-12-19 PROCEDURE — 1036F TOBACCO NON-USER: CPT | Performed by: PHYSICIAN ASSISTANT

## 2023-12-19 ASSESSMENT — PAIN SCALES - GENERAL: PAINLEVEL: 0-NO PAIN

## 2023-12-19 NOTE — PATIENT INSTRUCTIONS
Good seeing you today,    I am not recommending repeat sleep study based on information I have today.    Let us know if you need anything else>    Pedro Edouard PA-C    IMPORTANT PHONE NUMBERS     [phone] ([id number])  [employer] ([employer]): [phone]  For clinical questions and refilling prescriptions: [phone]  Mady Will (For Tobin/Silke): P: [phone]

## 2023-12-19 NOTE — PROGRESS NOTES
"[facility]  New Visit Note        HISTORY OF PRESENT ILLNESS     The patient's referring provider is: Luis Armando Benito MD    HISTORY OF PRESENT ILLNESS   Shoneia M Collins \"Tom\" is a 43 y.o. female who presents to a [facility] for a sleep medicine evaluation with concerns of Review Sleep Study Results.     PAST SLEEP HISTORY    Patient has the following sleep-related diagnoses and sleep study results: negative for MUMTAZ in 2021.    CURRENT HISTORY    On today's visit, the patient reports she is  pursuing bariatric surgery with Dr. Benito.  She denies any symptoms of sleep disordered breathing including daytime sleepiness, snoring, frequent waking at night, or morning headaches sore throat and dry mouth.    STOP  1 P  BANG 2 BN    Sleep schedule  on weekdays / work days:  Usual Bedtime  8 p  Falls asleep around  9 p  Wake time  6 a    Sleep schedule  on weekends/non work days :  same    Naps:   no    Average sleep duration ? hours/day    Preferred sleeping position: side, stomach    Sleep-related ROS:    Sleep Initiation: no problems going to sleep    Sleep Maintenance: denies problematic awakenings    Breathing during sleep: no symptoms of snoring or concerns of breathing at night    RLS screen:  denies    Sleep-related behaviors:  denies    Daytime Symptoms  On awakening patient reports: no issues    Patient report some daytime symptoms including: none    Recreational drug use  Caffeine consumption:  no  Alcohol consumption: 2-3/wk  Smoking: quit 2001  Marijuana: no    ESS: 0  AMY: 0  FOSQ:  40      REVIEW OF SYSTEMS     All other systems negative      ALLERGIES AND MEDICATIONS     ALLERGIES  Allergies   Allergen Reactions    Pollen Extracts Runny nose       MEDICATIONS  Current Outpatient Medications   Medication Sig Dispense Refill    acetaminophen (Tylenol) 500 mg tablet Take 1 tablet (500 mg) by mouth every 6 hours if needed for mild pain (1 - 3). "      clindamycin (Cleocin T) 1 % lotion Apply topically.      cyanocobalamin (Vitamin B-12) 500 mcg tablet Take 1 tablet (500 mcg) by mouth once daily.      DULoxetine (Cymbalta) 30 mg DR capsule Take 1 capsule (30 mg) by mouth 2 times a day. Do not crush or chew. 60 capsule 0    DULoxetine (Cymbalta) 60 mg DR capsule Take 1 capsule (60 mg) by mouth once daily in the evening. Do not crush or chew.      ferrous sulfate 325 (65 Fe) MG tablet Take 1 tablet (325 mg) by mouth once daily. 90 tablet 3    fluticasone (Flonase) 50 mcg/actuation nasal spray Administer into affected nostril(s).      folic acid (Folvite) 1 mg tablet Take by mouth.      ketoconazole (NIZOral) 2 % cream Apply topically 2 times a day.      loratadine (Claritin Reditabs) 10 mg disintegrating tablet Take 1 tablet (10 mg) by mouth once daily.      meloxicam (Mobic) 7.5 mg tablet Take 1 tablet (7.5 mg) by mouth once daily. 30 tablet 11    tiZANidine (Zanaflex) 4 mg capsule Take 1 capsule (4 mg) by mouth once daily at bedtime. 30 capsule 11     No current facility-administered medications for this visit.         PAST HISTORY     PAST MEDICAL HISTORY  She  has a past medical history of Benign hypertension (10/30/2018), Class 3 severe obesity due to excess calories without serious comorbidity with body mass index (BMI) of 50.0 to 59.9 in adult (CMS/[facility]) (2023), Diabetes mellitus (CMS/[facility]) (2022), GERD (gastroesophageal reflux disease) (2023), Gestational diabetes mellitus in pregnancy, unspecified control (2017), Hypertrophy of tonsils with hypertrophy of adenoids (2014), Personal history of gestational diabetes (2017), Sleep apnea (2023), Snoring (11/10/2014), Thrombophlebitis (2023), Varicose veins with pain (2023), Vitamin B12 deficiency (2023), and Vitamin D deficiency (2023).    PAST SURGICAL HISTORY:  Past Surgical History:   Procedure Laterality Date     SECTION, CLASSIC   11/10/2014     Section    GALLBLADDER SURGERY  11/10/2014    Gallbladder Surgery    OTHER SURGICAL HISTORY  2021    Laparoscopic hysterectomy    OTHER SURGICAL HISTORY  2021    Bilateral salpingectomy    OTHER SURGICAL HISTORY  2021    Cystoscopy    TUBAL LIGATION  2017    Tubal Ligation       FAMILY HISTORY  Family History   Problem Relation Name Age of Onset    Lung disease Mother      Diabetes Paternal Grandmother      Diabetes Other         SOCIAL HISTORY  She  reports that she quit smoking about 20 years ago. Her smoking use included cigarettes. She has never been exposed to tobacco smoke. She has never used smokeless tobacco. She reports current alcohol use. She reports current drug use. Drug: Marijuana.       PHYSICAL EXAM     VITAL SIGNS: LMP 10/07/2021      CURRENT WEIGHT: [unfilled]  BMI: [unfilled]  PREVIOUS WEIGHTS:  Wt Readings from Last 3 Encounters:   23 (!) 154 kg (338 lb 8 oz)   23 (!) 154 kg (339 lb)   23 (!) 154 kg (339 lb)       PHYSICAL EXAM: GENERAL: alert oriented x 3 pleasant and cooperative no acute distress  MODIFIED THOMAS SCORE: 2+  MODIFIED MALLAMPATI SCORE: 2+  LATERAL PHARYNGEAL WALL: 2+  TONSILLAR SCORE:   UVULA: midline  TONGUE SCALLOPING: normal  NECK EXAM: normal supple no adenopathy    RESULTS/DATA     Iron (ug/dL)   Date Value   2023 109   2022 42   2021 78     Iron Saturation (%)   Date Value   2023 33   2022 13 (L)   2021 22 (L)     TIBC (ug/dL)   Date Value   2023 327   2022 317   2021 356     Ferritin (ug/L)   Date Value   2023 118   2022 80   2021 56       ASSESSMENT/PLAN     Ms. Trimble is a 43 y.o. female and She was referred to the [facility] for the following issues:    OBSTRUCTIVE SLEEP APNEA, NEGATIVE  -Testing from  negative for MUMTAZ, she has lost about 30 kg since this study  -Denies any symptoms of MUMTAZ including  snoring, witnessed apneas, AM headache, sore throat, dry mouth  -I don't recommend further sleep testing from sleep medicine perspective at this time  -Order is already in system if sleep study is required    OBESITY  -BMI 53 TODAY  -Encouraged weight loss through diet and exercise     Follow up as needed

## 2023-12-21 ENCOUNTER — OFFICE VISIT (OUTPATIENT)
Dept: BEHAVIORAL HEALTH | Facility: CLINIC | Age: 43
End: 2023-12-21
Payer: COMMERCIAL

## 2023-12-21 ENCOUNTER — TELEPHONE (OUTPATIENT)
Dept: PREADMISSION TESTING | Facility: HOSPITAL | Age: 43
End: 2023-12-21

## 2023-12-21 ENCOUNTER — ANESTHESIA EVENT (OUTPATIENT)
Dept: OPERATING ROOM | Facility: HOSPITAL | Age: 43
End: 2023-12-21
Payer: COMMERCIAL

## 2023-12-21 VITALS — BODY MASS INDEX: 45.99 KG/M2 | WEIGHT: 293 LBS | HEIGHT: 67 IN

## 2023-12-21 DIAGNOSIS — F12.90 USE OF CANNABIS: ICD-10-CM

## 2023-12-21 DIAGNOSIS — E66.01 CLASS 3 SEVERE OBESITY DUE TO EXCESS CALORIES WITHOUT SERIOUS COMORBIDITY WITH BODY MASS INDEX (BMI) OF 50.0 TO 59.9 IN ADULT (MULTI): ICD-10-CM

## 2023-12-21 PROCEDURE — 3008F BODY MASS INDEX DOCD: CPT | Performed by: PSYCHOLOGIST

## 2023-12-21 PROCEDURE — 90791 PSYCH DIAGNOSTIC EVALUATION: CPT | Performed by: PSYCHOLOGIST

## 2023-12-21 PROCEDURE — 1036F TOBACCO NON-USER: CPT | Performed by: PSYCHOLOGIST

## 2023-12-21 RX ORDER — BISMUTH SUBSALICYLATE 262 MG
1 TABLET,CHEWABLE ORAL DAILY
COMMUNITY
End: 2024-04-09 | Stop reason: SDUPTHER

## 2023-12-21 ASSESSMENT — ANXIETY QUESTIONNAIRES
6. BECOMING EASILY ANNOYED OR IRRITABLE: SEVERAL DAYS
2. NOT BEING ABLE TO STOP OR CONTROL WORRYING: NOT AT ALL
3. WORRYING TOO MUCH ABOUT DIFFERENT THINGS: SEVERAL DAYS
7. FEELING AFRAID AS IF SOMETHING AWFUL MIGHT HAPPEN: NOT AT ALL
4. TROUBLE RELAXING: NOT AT ALL
5. BEING SO RESTLESS THAT IT IS HARD TO SIT STILL: NOT AT ALL
GAD7 TOTAL SCORE: 3
1. FEELING NERVOUS, ANXIOUS, OR ON EDGE: SEVERAL DAYS
IF YOU CHECKED OFF ANY PROBLEMS ON THIS QUESTIONNAIRE, HOW DIFFICULT HAVE THESE PROBLEMS MADE IT FOR YOU TO DO YOUR WORK, TAKE CARE OF THINGS AT HOME, OR GET ALONG WITH OTHER PEOPLE: SOMEWHAT DIFFICULT

## 2023-12-21 ASSESSMENT — LIFESTYLE VARIABLES
HOW OFTEN DURING THE LAST YEAR HAVE YOU HAD A FEELING OF GUILT OR REMORSE AFTER DRINKING: NEVER
HOW MANY STANDARD DRINKS CONTAINING ALCOHOL DO YOU HAVE ON A TYPICAL DAY: 3 OR 4
AUDIT-C TOTAL SCORE: 4
HOW OFTEN DURING THE LAST YEAR HAVE YOU BEEN UNABLE TO REMEMBER WHAT HAPPENED THE NIGHT BEFORE BECAUSE YOU HAD BEEN DRINKING: NEVER
AUDIT-C TOTAL SCORE: 4
HOW OFTEN DURING THE LAST YEAR HAVE YOU NEEDED AN ALCOHOLIC DRINK FIRST THING IN THE MORNING TO GET YOURSELF GOING AFTER A NIGHT OF HEAVY DRINKING: NEVER
SKIP TO QUESTIONS 9-10: 0
HOW OFTEN DURING THE LAST YEAR HAVE YOU FAILED TO DO WHAT WAS NORMALLY EXPECTED FROM YOU BECAUSE OF DRINKING: NEVER
HOW OFTEN DO YOU HAVE SIX OR MORE DRINKS ON ONE OCCASION: NEVER
AUDIT TOTAL SCORE: 0
HOW OFTEN DO YOU HAVE A DRINK CONTAINING ALCOHOL: 2-3 TIMES A WEEK
HAVE YOU OR SOMEONE ELSE BEEN INJURED AS A RESULT OF YOUR DRINKING: NO
HOW OFTEN DURING THE LAST YEAR HAVE YOU FOUND THAT YOU WERE NOT ABLE TO STOP DRINKING ONCE YOU HAD STARTED: NEVER
HAS A RELATIVE, FRIEND, DOCTOR, OR ANOTHER HEALTH PROFESSIONAL EXPRESSED CONCERN ABOUT YOUR DRINKING OR SUGGESTED YOU CUT DOWN: NO
AUDIT TOTAL SCORE: 4

## 2023-12-21 ASSESSMENT — PAIN SCALES - GENERAL: PAINLEVEL: 8

## 2023-12-21 NOTE — PROGRESS NOTES
"Time started: 219  Time ended: 307    Total time spent in-person: 48 minutes    Metabolic Bariatric Surgery: Behavioral Health Evaluation:   Referral: Dr. Benito   Chief Complaint: difficulty with excess weight and comorbid medical conditions.   Brief Background:   Shoneia is a 43 year-old single, Black, female. She lives with 2 of her kids and her daughters boyfriend and she feels safe in the home. The patient was born and raised in [place], [place] and raised by her mother. The patient has 3 children (25, 22 and 21). The patient has 1 sibling.   Employment: Currently, she is unemployed. 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   Education: Some college.   Learning disorders (reading or reading comprehension) Denied     Weight history:  Shoneia \"noticed\" she was having problems with excess weight when she was approximately in her mid-30s.   Highest adult weight (non-pregnant): 492 lbs.   Lowest adult weight: 330 lbs  She is 5'7\"   Current weight: 336 lbs  Expected excess body weight loss, 12-18 months post surgery. She expects to lose 100 pounds.     Surgeon, Support system, timing, risks/benefits from a BH perspective:   Dr. Benito is the patient's surgeon. They agreed to the gastric bypass procedure and the medical risks and benefits were discussed. Risks/benefits of the surgery from a behavioral health perspective were reviewed.   Support:   Her daughter will be her support system after the surgery.   Family/friends supportive: yes  Family/friends have concerns that need to be addressed: her daughter does not want her mother to get the surgery. Her daughter spoke with Dr. Benito about her concerns.   The timing for surgery:   The patient can take time off work: Not Applicable   Current legal issues: Denied  Psychosocial stressors: daily pain in her hip, parent-related, and taking care of her grandchild, and lack of employment, and family stress.   Coping mechanisms include: eating out of boredom and " isolation  Coping was rated as not effective.   Factors contributing to weight gain and weight regain:  Genetics: Yes, her mother.   Family history of obesity and other obesity-related medical conditions:   Mother had problems with her weight. She  in  from a complicated medical condition.     Medications: denied.   Medical conditions: arthritis  Hormonal:   If applicable, weight changes after pregnancies. Yes  Any infertility treatments: No    Mental health history:   Shoneia denied a history of depression, bipolar disorder, anxiety, panic attacks or disorder, social anxiety, obsessive compulsive disorder, disorder of thought, personality disorder, and/or PTSD.   Shoenia denied a history of mental health treatment.   Hospitalizations for mental health: Denied   Suicide attempts: denied   or self-injurious behaviors: denied  Insomnia: she denied problems with sleep initiation. She stated that she has problems sleeping through the night due to pain.   History of problems with impulse control: denied   Cognitive (memory problems and/or forgetfulness): denied   Substance, tobacco, and alcohol use history:  History of alcohol use disorder, substance use disorder, tobacco dependence: yes/no  Treatment programs for substance and/or alcohol use:   Current alcohol habits:   AUDIT = 4. She was consuming alcohol 2-3 times per week. She was consuming 3-4 drinks on average on the days she consumed alcohol. She denied having 6 or more drinks on one occasion over the past year.     Binge drinking: denied   Current Tobacco use habits:   Tobacco: last time was in .     Current Cannabis use: Denied CBD products, denied edibles, denied medical marijuana, denied vaping  Last time she used cannabis was 2 weeks ago. She was smoking 3 to 5 days per week. She was smoking an eighth of an ounce per week, or approximately 3 blunts.  If street cannabis: a neighbor  Other drug use: denied     Mental Status Exam: Shoneia was  casually dressed and neatly groomed. She had difficulty walking due to pain in her right hip. Her affect was observed as anxious. She reported feeling stressed at home and that she just deals with it.  She has used alcohol and cannabis to cope. However, her depression and anxiety screenings were negative. Her judgement and decision making appears fair. She denied suicidal ideation or plan. Her thought content contained stressful themes regarding her relationship with various family members.     Summary:   Shoneia is a 43-year-old single woman who presents today with a history of obesity with comorbid medical conditions and difficulty with weight loss. The purpose of this evaluation was to conduct a behavioral health evaluation for metabolic bariatric surgery to determine if she is an appropriate candidate for weight loss surgery from a behavioral health perspective.    Eating Habits Checklist = 2. This score falls in the range of no binge eating habits.   Alcohol Use Identification Test-C (AUDIT-C) = 4. This score indicates that she may be a mild risk for hazardous alcohol use. However, she stated that she stopped drinking alcohol after she met with Dr. Benito.   Generalized anxiety disorder questionnaire-7 (SARITA-7) = 3. This score falls into the range of no to minimal generalized anxiety.   Patient Health Questionnaire -9 (PHQ-9) = 0. This is a negative screen for depression.       Behavioral Health Plan for clearance:     We will schedule in January 2024 to complete the evaluation. The appointment started late due to her getting lost. We discussed that she will need a negative drug tox  urine screen as well due to the use of cannabis.   Will also meet to provide information about coping with stress.

## 2023-12-26 ENCOUNTER — HOSPITAL ENCOUNTER (OUTPATIENT)
Dept: OPERATING ROOM | Facility: HOSPITAL | Age: 43
Setting detail: OUTPATIENT SURGERY
Discharge: HOME | End: 2023-12-26
Payer: COMMERCIAL

## 2023-12-26 ENCOUNTER — ANESTHESIA (OUTPATIENT)
Dept: OPERATING ROOM | Facility: HOSPITAL | Age: 43
End: 2023-12-26
Payer: COMMERCIAL

## 2023-12-26 VITALS
RESPIRATION RATE: 16 BRPM | SYSTOLIC BLOOD PRESSURE: 147 MMHG | WEIGHT: 293 LBS | BODY MASS INDEX: 45.99 KG/M2 | HEART RATE: 68 BPM | TEMPERATURE: 97.2 F | DIASTOLIC BLOOD PRESSURE: 70 MMHG | OXYGEN SATURATION: 98 % | HEIGHT: 67 IN

## 2023-12-26 DIAGNOSIS — K20.90 ESOPHAGITIS DETERMINED BY ENDOSCOPY: Primary | ICD-10-CM

## 2023-12-26 DIAGNOSIS — Z00.00 ROUTINE CHECK-UP: ICD-10-CM

## 2023-12-26 PROBLEM — K21.9 GERD (GASTROESOPHAGEAL REFLUX DISEASE): Status: RESOLVED | Noted: 2023-08-02 | Resolved: 2023-12-26

## 2023-12-26 PROCEDURE — 3600000007 HC OR TIME - EACH INCREMENTAL 1 MINUTE - PROCEDURE LEVEL TWO: Performed by: NURSE ANESTHETIST, CERTIFIED REGISTERED

## 2023-12-26 PROCEDURE — 2500000004 HC RX 250 GENERAL PHARMACY W/ HCPCS (ALT 636 FOR OP/ED): Performed by: NURSE ANESTHETIST, CERTIFIED REGISTERED

## 2023-12-26 PROCEDURE — 3700000001 HC GENERAL ANESTHESIA TIME - INITIAL BASE CHARGE: Performed by: NURSE ANESTHETIST, CERTIFIED REGISTERED

## 2023-12-26 PROCEDURE — 3700000002 HC GENERAL ANESTHESIA TIME - EACH INCREMENTAL 1 MINUTE: Performed by: NURSE ANESTHETIST, CERTIFIED REGISTERED

## 2023-12-26 PROCEDURE — 87900 PHENOTYPE INFECT AGENT DRUG: CPT | Performed by: SURGERY

## 2023-12-26 PROCEDURE — 88305 TISSUE EXAM BY PATHOLOGIST: CPT | Mod: TC,SUR,GEALAB | Performed by: SURGERY

## 2023-12-26 PROCEDURE — A43239 PR EDG TRANSORAL BIOPSY SINGLE/MULTIPLE: Performed by: NURSE ANESTHETIST, CERTIFIED REGISTERED

## 2023-12-26 PROCEDURE — 88305 TISSUE EXAM BY PATHOLOGIST: CPT | Performed by: PATHOLOGY

## 2023-12-26 PROCEDURE — 3600000002 HC OR TIME - INITIAL BASE CHARGE - PROCEDURE LEVEL TWO: Performed by: NURSE ANESTHETIST, CERTIFIED REGISTERED

## 2023-12-26 PROCEDURE — 7100000009 HC PHASE TWO TIME - INITIAL BASE CHARGE: Performed by: NURSE ANESTHETIST, CERTIFIED REGISTERED

## 2023-12-26 PROCEDURE — 43239 EGD BIOPSY SINGLE/MULTIPLE: CPT | Performed by: SURGERY

## 2023-12-26 PROCEDURE — 7100000010 HC PHASE TWO TIME - EACH INCREMENTAL 1 MINUTE: Performed by: NURSE ANESTHETIST, CERTIFIED REGISTERED

## 2023-12-26 RX ORDER — MIDAZOLAM HYDROCHLORIDE 1 MG/ML
INJECTION INTRAMUSCULAR; INTRAVENOUS AS NEEDED
Status: DISCONTINUED | OUTPATIENT
Start: 2023-12-26 | End: 2023-12-26

## 2023-12-26 RX ORDER — PROPOFOL 10 MG/ML
INJECTION, EMULSION INTRAVENOUS AS NEEDED
Status: DISCONTINUED | OUTPATIENT
Start: 2023-12-26 | End: 2023-12-26

## 2023-12-26 RX ORDER — FENTANYL CITRATE 50 UG/ML
INJECTION, SOLUTION INTRAMUSCULAR; INTRAVENOUS AS NEEDED
Status: DISCONTINUED | OUTPATIENT
Start: 2023-12-26 | End: 2023-12-26

## 2023-12-26 RX ORDER — FAMOTIDINE 20 MG/1
20 TABLET, FILM COATED ORAL NIGHTLY
Qty: 90 TABLET | Refills: 1 | Status: SHIPPED | OUTPATIENT
Start: 2023-12-26 | End: 2024-04-23 | Stop reason: HOSPADM

## 2023-12-26 RX ADMIN — PROPOFOL 50 MG: 10 INJECTION, EMULSION INTRAVENOUS at 11:44

## 2023-12-26 RX ADMIN — MIDAZOLAM HYDROCHLORIDE 2 MG: 1 INJECTION, SOLUTION INTRAMUSCULAR; INTRAVENOUS at 11:29

## 2023-12-26 RX ADMIN — SODIUM CHLORIDE, POTASSIUM CHLORIDE, SODIUM LACTATE AND CALCIUM CHLORIDE: 600; 310; 30; 20 INJECTION, SOLUTION INTRAVENOUS at 11:29

## 2023-12-26 RX ADMIN — FENTANYL CITRATE 50 MCG: 50 INJECTION, SOLUTION INTRAMUSCULAR; INTRAVENOUS at 11:33

## 2023-12-26 RX ADMIN — FENTANYL CITRATE 50 MCG: 50 INJECTION, SOLUTION INTRAMUSCULAR; INTRAVENOUS at 11:29

## 2023-12-26 RX ADMIN — PROPOFOL 50 MG: 10 INJECTION, EMULSION INTRAVENOUS at 11:42

## 2023-12-26 RX ADMIN — PROPOFOL 30 MG: 10 INJECTION, EMULSION INTRAVENOUS at 11:37

## 2023-12-26 RX ADMIN — PROPOFOL 50 MG: 10 INJECTION, EMULSION INTRAVENOUS at 11:35

## 2023-12-26 RX ADMIN — PROPOFOL 50 MG: 10 INJECTION, EMULSION INTRAVENOUS at 11:39

## 2023-12-26 SDOH — HEALTH STABILITY: MENTAL HEALTH: CURRENT SMOKER: 0

## 2023-12-26 ASSESSMENT — PAIN SCALES - GENERAL
PAINLEVEL_OUTOF10: 0 - NO PAIN
PAINLEVEL_OUTOF10: 0 - NO PAIN
PAINLEVEL_OUTOF10: 6

## 2023-12-26 ASSESSMENT — PAIN - FUNCTIONAL ASSESSMENT
PAIN_FUNCTIONAL_ASSESSMENT: 0-10

## 2023-12-26 NOTE — ANESTHESIA PREPROCEDURE EVALUATION
"Patient: Shoneia M Collins \"Tom\"    Procedure Information       Date/Time: 23 1000    Procedure: EGD    Location: [facility] OR          Vitals:    23 0930   BP: (!) 155/98   Pulse: 76   Resp: 18   Temp: 36.1 °C (97 °F)   SpO2: 100%       Past Surgical History:   Procedure Laterality Date     SECTION, CLASSIC  11/10/2014     Section    GALLBLADDER SURGERY  11/10/2014    Gallbladder Surgery    OTHER SURGICAL HISTORY  2021    Laparoscopic hysterectomy    OTHER SURGICAL HISTORY  2021    Bilateral salpingectomy    OTHER SURGICAL HISTORY  2021    Cystoscopy    TONSILLECTOMY      TUBAL LIGATION  2017    Tubal Ligation     Past Medical History:   Diagnosis Date    Anemia     Bariatric surgery status     Benign hypertension 10/30/2018    Class 3 severe obesity due to excess calories without serious comorbidity with body mass index (BMI) of 50.0 to 59.9 in adult (CMS/[facility]) 2023    GERD (gastroesophageal reflux disease) 2023    Gestational diabetes mellitus in pregnancy, unspecified control 2017    Gestational diabetes mellitus (GDM) in second trimester, gestational diabetes method of control unspecified    Hypertrophy of tonsils with hypertrophy of adenoids 2014    Tonsillar and adenoid hypertrophy    Personal history of gestational diabetes 2017    History of gestational diabetes mellitus (GDM)    Snoring 11/10/2014    Snoring    Thrombophlebitis 2023    Varicose veins with pain 2023    Vitamin B12 deficiency 2023    Vitamin D deficiency 2023       Current Outpatient Medications:     meloxicam (Mobic) 7.5 mg tablet, Take 1 tablet (7.5 mg) by mouth once daily., Disp: 30 tablet, Rfl: 11    acetaminophen (Tylenol) 500 mg tablet, Take 1 tablet (500 mg) by mouth every 6 hours if needed for mild pain (1 - 3)., Disp: , Rfl:     clindamycin (Cleocin T) 1 % lotion, Apply topically., Disp: , Rfl:     " cyanocobalamin (Vitamin B-12) 500 mcg tablet, Take 1 tablet (500 mcg) by mouth once daily., Disp: , Rfl:     DULoxetine (Cymbalta) 60 mg DR capsule, Take 1 capsule (60 mg) by mouth once daily in the evening. Do not crush or chew., Disp: , Rfl:     ferrous sulfate 325 (65 Fe) MG tablet, Take 1 tablet (325 mg) by mouth once daily., Disp: 90 tablet, Rfl: 3    fluticasone (Flonase) 50 mcg/actuation nasal spray, Administer into affected nostril(s)., Disp: , Rfl:     folic acid (Folvite) 1 mg tablet, Take by mouth., Disp: , Rfl:     ketoconazole (NIZOral) 2 % cream, Apply topically 2 times a day., Disp: , Rfl:     loratadine (Claritin Reditabs) 10 mg disintegrating tablet, Take 1 tablet (10 mg) by mouth once daily., Disp: , Rfl:     multivitamin tablet, Take 1 tablet by mouth once daily., Disp: , Rfl:     tiZANidine (Zanaflex) 4 mg capsule, Take 1 capsule (4 mg) by mouth once daily at bedtime., Disp: 30 capsule, Rfl: 11  Prior to Admission medications    Medication Sig Start Date End Date Taking? Authorizing Provider   meloxicam (Mobic) 7.5 mg tablet Take 1 tablet (7.5 mg) by mouth once daily. 11/14/23 11/13/24 Yes Oliva Davila, APRN-CNP   acetaminophen (Tylenol) 500 mg tablet Take 1 tablet (500 mg) by mouth every 6 hours if needed for mild pain (1 - 3).    Historical Provider, MD   clindamycin (Cleocin T) 1 % lotion Apply topically.    Historical Provider, MD   cyanocobalamin (Vitamin B-12) 500 mcg tablet Take 1 tablet (500 mcg) by mouth once daily. 2/22/23   Historical Provider, MD   DULoxetine (Cymbalta) 60 mg DR capsule Take 1 capsule (60 mg) by mouth once daily in the evening. Do not crush or chew.    Historical Provider, MD   ferrous sulfate 325 (65 Fe) MG tablet Take 1 tablet (325 mg) by mouth once daily. 8/2/23 8/1/24  Goran Triplett, DO   fluticasone (Flonase) 50 mcg/actuation nasal spray Administer into affected nostril(s). 6/30/22   Historical Provider, MD   folic acid (Folvite) 1 mg tablet Take by mouth.  "22   Historical Provider, MD   ketoconazole (NIZOral) 2 % cream Apply topically 2 times a day.    Historical Provider, MD   loratadine (Claritin Reditabs) 10 mg disintegrating tablet Take 1 tablet (10 mg) by mouth once daily. 23   Historical Provider, MD   multivitamin tablet Take 1 tablet by mouth once daily.    Historical Provider, MD   tiZANidine (Zanaflex) 4 mg capsule Take 1 capsule (4 mg) by mouth once daily at bedtime. 23  [facility]   DULoxetine (Cymbalta) 30 mg DR capsule Take 1 capsule (30 mg) by mouth 2 times a day. Do not crush or chew. 23  [facility]     Allergies   Allergen Reactions    Pollen Extracts Runny nose     Social History     Tobacco Use    Smoking status: Former     Types: Cigarettes     Quit date:      Years since quittin.9     Passive exposure: Never    Smokeless tobacco: Never   Substance Use Topics    Alcohol use: Not Currently     Comment: occassionally         Chemistry    Lab Results   Component Value Date/Time     2023 1108    K 3.3 (L) 2023 1108     2023 1108    CO2 26 2023 1108    BUN 8 2023 1108    CREATININE 0.73 2023 1108    Lab Results   Component Value Date/Time    CALCIUM 9.4 2023 1108    ALKPHOS 62 2023 1108    AST 53 (H) 2023 1108    ALT 22 2023 1108    BILITOT 0.8 2023 1108          Lab Results   Component Value Date/Time    WBC 6.6 2023 1108    HGB 11.5 (L) 2023 1108    HCT 36.5 2023 1108     2023 1108     No results found for: \"PROTIME\", \"PTT\", \"INR\"  Encounter Date: 23   ECG 12 lead (Clinic Performed)   Result Value    Ventricular Rate 66    Atrial Rate 66    CA Interval 190    QRS Duration 86    QT Interval 378    QTC Calculation(Bazett) 396    P Axis 33    R Axis 20    T Axis 26    QRS Count 10    Q Onset 223    P Onset 128    P Offset 185    T Offset 412    QTC Fredericia 390    " Narrative    Normal sinus rhythm with sinus arrhythmia  Normal ECG  When compared with ECG of 06-AUG-2023 10:24,  PREVIOUS ECG IS PRESENT  Confirmed by Liam Doss ([id number]) on 11/16/2023 9:38:49 PM     No results found for this or any previous visit from the past 1095 days.      Relevant Problems   Anesthesia (within normal limits)      Cardiovascular   (+) Benign hypertension      Endocrine   (+) Class 3 severe obesity due to excess calories without serious comorbidity with body mass index (BMI) of 50.0 to 59.9 in adult (CMS/[facility])      GI   (+) GERD (gastroesophageal reflux disease) (Resolved)      /Renal (within normal limits)      Neuro/Psych (within normal limits)      Pulmonary (within normal limits)      GI/Hepatic (within normal limits)      Hematology   (+) Anemia      Musculoskeletal   (+) Primary osteoarthritis of both hips      Infectious Disease   (+) Candidal vulvovaginitis   (+) Hidradenitis suppurativa   (+) Tinea corporis      Other   (+) Arthritis   (+) Cervical lymphadenopathy   (+) Lingual tonsil hypertrophy       Clinical information reviewed:   Tobacco  Allergies    Med Hx             NPO Detail:  No data recorded     Physical Exam    Airway  Mallampati: I  TM distance: >3 FB  Neck ROM: full     Cardiovascular - normal exam  Rhythm: regular  Rate: normal     Dental    Pulmonary   Breath sounds clear to auscultation     Abdominal            Anesthesia Plan    ASA 3     MAC     The patient is not a current smoker.  Patient was not previously instructed to abstain from smoking on day of procedure.  Patient did not smoke on day of procedure.  Education provided regarding risk of obstructive sleep apnea.  intravenous induction   Anesthetic plan and risks discussed with patient.    Plan discussed with CRNA.

## 2023-12-26 NOTE — ANESTHESIA POSTPROCEDURE EVALUATION
"Patient: Shoneia M Collins \"Tom\"    Procedure Summary       Date: 12/26/23 Room / Location: [facility] OR    Anesthesia Start: 1129 Anesthesia Stop: 1152    Procedure: EGD Diagnosis: Routine check-up    Scheduled Providers:  Responsible Provider: ALBERTINA Sosa    Anesthesia Type: MAC ASA Status: 3            Anesthesia Type: MAC    Vitals Value Taken Time   /103 12/26/23 1151   Temp 36.2 °C (97.2 °F) 12/26/23 1146   Pulse 67 12/26/23 1146   Resp 16 12/26/23 1146   SpO2 100 % 12/26/23 1209   Vitals shown include unvalidated device data.    Anesthesia Post Evaluation    Patient location during evaluation: PACU  Patient participation: complete - patient participated  Level of consciousness: awake  Pain management: adequate  Multimodal analgesia pain management approach  Airway patency: patent  Two or more strategies used to mitigate risk of obstructive sleep apnea  Cardiovascular status: acceptable  Respiratory status: acceptable  Hydration status: acceptable  Postoperative Nausea and Vomiting: none        No notable events documented.    "

## 2024-01-02 LAB
LAB AP ASR DISCLAIMER: NORMAL
LABORATORY COMMENT REPORT: NORMAL
PATH REPORT.COMMENTS IMP SPEC: NORMAL
PATH REPORT.FINAL DX SPEC: NORMAL
PATH REPORT.GROSS SPEC: NORMAL
PATH REPORT.RELEVANT HX SPEC: NORMAL
PATH REPORT.TOTAL CANCER: NORMAL

## 2024-01-05 ENCOUNTER — NUTRITION (OUTPATIENT)
Dept: SURGERY | Facility: CLINIC | Age: 44
End: 2024-01-05
Payer: COMMERCIAL

## 2024-01-05 VITALS — WEIGHT: 293 LBS | BODY MASS INDEX: 51.53 KG/M2

## 2024-01-05 NOTE — PROGRESS NOTES
PREOPERATIVE, MULTIDISCIPLINARY, MEDICALLY SUPERVISED, REDUCED CALORIE DIET, BEHAVIOR MODIFICATION AND EXERCISE PROGRAM    S:  The pt has been tracking her intake in the [employer] lui and has been learning how it works. Sometimes will skip a meal when she is not hungry. Has been meeting her protein goals. Has been eating slowly. Has been practicing the 30s rule but struggles a little bit. Has been trying to take sips. Has been drinking water and herbal tea. Has only had coffee once per month. Has been meeting her fluid goals. Has decreased her alcohol intake to special occasions.     O:    Wt: 329lbs   Ht:     67in      Body mass index is 51.53 kg/m².    Goal: 5% body weight loss over the course of program    Dietary recommendation:   1. Continue to drink 64oz of noncarbonated/caffeine free/sugar free beverages   2. Practice the 30-30-30 rule by drinking between meals. Use a timer to help with this.   3. Structure your meal plan - have 3 meals and 1 snack daily. Be sure to have at least a protein when you are not hungry.   4. Continue to track your intake and get 1500kcals and get at least 70g per day.  Put in saved recipes with specific measurements to be sure you are documenting accurate information.   5. Increase intake of non-starchy vegetables.  Have 5 servings fruits and vegetables daily.   6. Take a multivitamin daily.  7. Continue water aerobics. Consider tracking your steps and setting weekly goals. Increase physical activity by 10-15 minutes to an end goal of 60 minutes 5 x per week.    Group Topic: Label Reading   Behavioral recommendation: Pt is encouraged to read labels regularly to identify the key information on food label ingredient lists; such as fats, carbohydrates, protein and serving sizes.    A/P: Pt appears to have a good understanding of how to read labels for important nutritional information. Pt has a goal to read labels at home or when purchasing food at the supermarket. The pt is doing  well and working towards her goals. Will follow up next month.     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

## 2024-01-08 ENCOUNTER — TELEPHONE (OUTPATIENT)
Dept: SURGERY | Facility: CLINIC | Age: 44
End: 2024-01-08
Payer: COMMERCIAL

## 2024-01-08 DIAGNOSIS — A04.8 H. PYLORI INFECTION: Primary | ICD-10-CM

## 2024-01-08 RX ORDER — OMEPRAZOLE 20 MG/1
20 CAPSULE, DELAYED RELEASE ORAL
Qty: 28 CAPSULE | Refills: 0 | Status: SHIPPED | OUTPATIENT
Start: 2024-01-08 | End: 2024-04-09 | Stop reason: ALTCHOICE

## 2024-01-08 RX ORDER — AMOXICILLIN 500 MG/1
1000 TABLET, FILM COATED ORAL EVERY 12 HOURS SCHEDULED
Qty: 56 TABLET | Refills: 0 | Status: SHIPPED | OUTPATIENT
Start: 2024-01-08 | End: 2024-01-22

## 2024-01-08 RX ORDER — CLARITHROMYCIN 500 MG/1
500 TABLET, FILM COATED ORAL 2 TIMES DAILY
Qty: 28 TABLET | Refills: 0 | Status: SHIPPED | OUTPATIENT
Start: 2024-01-08 | End: 2024-01-22

## 2024-01-08 NOTE — TELEPHONE ENCOUNTER
This Rn called patient to make patient aware that her biopsy from her EGD came back positive for H. Pylori. Dr. Benito ordered patient appropriate medication and Rn educate patient on medication. RN educated patient to stop taking Pepcid while taking Omeprazole. Patient verbalized understanding. RN educated patient will do a breath test prior to surgery.

## 2024-01-09 ENCOUNTER — APPOINTMENT (OUTPATIENT)
Dept: BEHAVIORAL HEALTH | Facility: HOSPITAL | Age: 44
End: 2024-01-09
Payer: COMMERCIAL

## 2024-01-09 ENCOUNTER — LAB (OUTPATIENT)
Dept: LAB | Facility: LAB | Age: 44
End: 2024-01-09
Payer: COMMERCIAL

## 2024-01-09 ENCOUNTER — OFFICE VISIT (OUTPATIENT)
Dept: BEHAVIORAL HEALTH | Facility: HOSPITAL | Age: 44
End: 2024-01-09
Payer: COMMERCIAL

## 2024-01-09 DIAGNOSIS — F50.9 EATING DISORDER IN REMISSION: ICD-10-CM

## 2024-01-09 DIAGNOSIS — E66.01 CLASS 3 SEVERE OBESITY DUE TO EXCESS CALORIES WITHOUT SERIOUS COMORBIDITY WITH BODY MASS INDEX (BMI) OF 50.0 TO 59.9 IN ADULT (MULTI): ICD-10-CM

## 2024-01-09 DIAGNOSIS — F12.90 USE OF CANNABIS: ICD-10-CM

## 2024-01-09 DIAGNOSIS — Z98.84 BARIATRIC SURGERY STATUS: ICD-10-CM

## 2024-01-09 LAB
APPEARANCE UR: CLEAR
BILIRUB UR STRIP.AUTO-MCNC: NEGATIVE MG/DL
COLOR UR: YELLOW
GLUCOSE UR STRIP.AUTO-MCNC: NEGATIVE MG/DL
KETONES UR STRIP.AUTO-MCNC: NEGATIVE MG/DL
LEUKOCYTE ESTERASE UR QL STRIP.AUTO: NEGATIVE
NITRITE UR QL STRIP.AUTO: NEGATIVE
PH UR STRIP.AUTO: 7 [PH]
PROT UR STRIP.AUTO-MCNC: NEGATIVE MG/DL
RBC # UR STRIP.AUTO: NEGATIVE /UL
SP GR UR STRIP.AUTO: 1.01
UROBILINOGEN UR STRIP.AUTO-MCNC: <2 MG/DL

## 2024-01-09 PROCEDURE — 90837 PSYTX W PT 60 MINUTES: CPT | Performed by: PSYCHOLOGIST

## 2024-01-09 PROCEDURE — 81003 URINALYSIS AUTO W/O SCOPE: CPT

## 2024-01-09 PROCEDURE — 1036F TOBACCO NON-USER: CPT | Performed by: PSYCHOLOGIST

## 2024-01-09 PROCEDURE — 3008F BODY MASS INDEX DOCD: CPT | Performed by: PSYCHOLOGIST

## 2024-01-09 ASSESSMENT — LIFESTYLE VARIABLES
MISUSED PRESCRIPTION DRUGS: YES
ABLE TO STOP USING DRUGS WHEN WANT: YES
SPOUSE OR PARENTS COMPLAIN ABOUT INVOLVEMENT WITH DRUGS: NO
NEGLECTED FAMILY OR WORK DUE TO DRUG USE: NO
EXPERIENCED WITHDRAWAL SYMPTOMS DUE TO HEAVY DRUG INTAKE: NO
ENGAGED IN ILLEGAL ACTIVITIES TO OBTAIN DRUGS: NO
BLACKOUTS OR FLASHBACKS DUE TO DRUG USE: NO
USE MORE THAN ONE DRUG AT A TIME: NO
FEEL BAD ABOUT YOUR DRUG USE: YES
MEDICAL PROBLEMS AS RESULT OF DRUG USE: NO
DAST10 TOTAL SCORE: 3

## 2024-01-09 NOTE — PROGRESS NOTES
"Time started: 1100 am  Time ended: 1200 noon  Virtual visit  Time spent 60 minutes.     Verbal consent was requested and obtained from patient on this date for a telehealth visit.   We discussed that the note will be visible and others healthcare practitioners will have access. The patient has consented/has not consented to an unrestricted note.  Shoneia is aware that the bariatric team will read her note.     Reason for visit: complete the behavioral health portion of the bariatric evaluation.   Mental health hx continued: chaotic life with family in her 20s and had a lot of stress. She thinks she may have had one lifetime panic attack. HTN high when stressed.   Stated that she is interested in stress management. Sometimes she uses food to cope with stress.   She wants a strategy to manage her stress. Referred her to [facility] to look into their stress management course.     Dietary history:   Diets tried: vegan diet. Physician's weight loss (B-12 and Adipex), fasting, no carbs  Diet and or exercise that were the most successful included: vegan diet with exercise and lost 130 lbs.  She never regained the weight. She was 492 lbs in her late 30s. She started losing the weight in 2021. She is at a plateau.   12/21/23: 336 lbs  At another office visit at  on 12/26/23:  329 lbs. BMI = 51.5. In her 30s, her BMI was 77 at 492 lbs.   BMI requirement for hip replacement surgery 41.   Height: She is 5'7\"   Currently, how does your excess weight affect your life? Mobility and pain.   Motivation for surgery: to be eligible for the hip replacement surgery. She wants to be healthy.     The patient eats more than intended or planned in response to:  yes to boredom, yes to anxiety, no to depression, not to anger, yes to happiness, not to frustration, yes to stress.     Food weaknesses include:  chocolate or Kit tai[employer] bars. She like fast foods  or just eating out, but it is not a weakness.     Disordered eating History:   Shoneia has " a history of an eating disorder: Denied    Current eating disorder assessment:  Compensatory behaviors: She denied vomiting, taking excessive laxatives, diuretics, excessive driven exercise, or starvation to lose.     Binge Eating Disorder symptoms: denied  Night Eating Syndrome: She craves sweets at night and used to eat sweets before bed and wake up in the middle of the night and eat.  She used to eat over 20% of her food at night. It also was related to her work schedule.  She would skip breakfast and no appetite for breakfast. She thinks she needed to eat to go to sleep.   She stopped eating sweets at night a couple of years ago.  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      Adherence: She is using the [employer] liu.  Working with a  registered dietician in the program: yes  The patient's pre-surgery weight loss goal is: 10 lbs.    She started the program in October 2023 and has lost over 10 lbs.   Current exercise habits: she is exercising and walking more  Current eating habits:   # of meals per day: 3  # of or range of snacks per day: 1  Pop: denied  Sweets: sometimes  Caffeine: coffee flavored protein shakes, but was instructed to stop prior to the surgery and she reported that she agreed.   Portion Sizes: reduced.   Shoneia has not been prescribed a CPAP machine.     Shoneia reported that she takes her medications as prescribed.   Shoneia has made the following behavioral changes since starting the , surgical weight loss program: she has made several dietary changes. Please see above.       Shoneia denied a diagnostic history of an alcohol use, substance use, or tobacco use disorder.   Last smoked cannabis, one hit over the holiday. Has not smoked a blunt since the beginning of December 2023.   Family hx of SUDs? Father is dependent on cocaine.     Drug Abuse Screening Test = 2. The score showed up  as 3 put the score for response question 3 has been entered incorrectly.   DAST score of 2 suggests that she has a low level of problems related to drug abuse but to monitor.     Plan for clearance remains the same: Shoneia need a negative drug urine screen for cannabis use to be cleared from behavioral health. She has been referred to [facility] to inquire about their stress management program.     Meet post surgery.

## 2024-01-10 ENCOUNTER — TELEPHONE (OUTPATIENT)
Dept: SURGERY | Facility: CLINIC | Age: 44
End: 2024-01-10

## 2024-01-10 ENCOUNTER — APPOINTMENT (OUTPATIENT)
Dept: INTEGRATIVE MEDICINE | Facility: CLINIC | Age: 44
End: 2024-01-10
Payer: COMMERCIAL

## 2024-01-10 ENCOUNTER — TELEPHONE (OUTPATIENT)
Dept: PRIMARY CARE | Facility: CLINIC | Age: 44
End: 2024-01-10

## 2024-01-10 LAB
ELECTRONICALLY SIGNED BY: NORMAL
H. PYLORI DRUG SUSCEPTIBILITY RESULTS: NORMAL
HOLD SPECIMEN: NORMAL

## 2024-01-10 NOTE — TELEPHONE ENCOUNTER
Patient called RN to go over clearances needed for bariatric surgery. Patient has seen the majority of the providers needed for clearances. RN went over clearances and what is needed. Patient verbalized understanding.

## 2024-01-17 ENCOUNTER — TELEPHONE (OUTPATIENT)
Dept: CARDIOLOGY | Facility: CLINIC | Age: 44
End: 2024-01-17
Payer: COMMERCIAL

## 2024-02-01 ENCOUNTER — NUTRITION (OUTPATIENT)
Dept: SURGERY | Facility: CLINIC | Age: 44
End: 2024-02-01
Payer: COMMERCIAL

## 2024-02-01 ENCOUNTER — TELEPHONE (OUTPATIENT)
Dept: SURGERY | Facility: CLINIC | Age: 44
End: 2024-02-01

## 2024-02-01 VITALS — HEIGHT: 67 IN | BODY MASS INDEX: 51.53 KG/M2

## 2024-02-01 NOTE — TELEPHONE ENCOUNTER
This RN received a message from the dietician stating that patient believed to have all clearances needed for bariatric surgery. This RN went through patient's chart and attempted to call patient to go over clearances still needed for bariatric surgery. RN was unable to leave voicemail for the patient. Will place an updated letter in [facility].

## 2024-02-01 NOTE — PROGRESS NOTES
PREOPERATIVE, MULTIDISCIPLINARY, MEDICALLY SUPERVISED, REDUCED CALORIE DIET, BEHAVIOR MODIFICATION AND EXERCISE PROGRAM    S:  The pt's phone has been broke so she has not been able to track. Has been better at having 3 meals a day consistently. B: fruit and greek yogurt, L: salad w/chicken (3-4oz) or soup (broccoli cheddar or chicken noodle or butternut squash) w/chicken, D: turkey burger (3-4oz) w/veggies, S: protein bar. When able to track, she makes sure that she is getting enough protein. Has been drinking water and herbal tea and has been meeting her goals. The pt has been feeling more comfortable practicing the post op behaviors. Has been taking a MVI.     O:    Wt: n/a    Goal: 5% body weight loss over the course of program    Dietary recommendation:   1. Continue to drink 64oz of noncarbonated/caffeine free/sugar free beverages   2. Practice the 30-30-30 rule by drinking between meals.  3. Structure your meal plan - have 3 meals and 1 snack daily.  4. Have balanced meals that always contain a good source of protein.  5. Increase intake of non-starchy vegetables.  Have 5 servings fruits and vegetables daily.   6. Continue to take a multivitamin daily.  7. Continue water aerobics and weighted exercises. Increase physical activity by 10-15 minutes to an end goal of 60 minutes 5 x per week.    Group Topic: Portion Control   Behavioral recommendation: Pt is encouraged to identify and use the appropriate serving sizes from each food group.    A/P: Pt appears to have a good understanding of how to incorporate appropriate portion sizes into their daily meal pattern.  Pt has a goal to begin weighing and measuring portions until able to visualize the appropriate portion size. The pt is doing well and meeting her goals. Is cleared from a nutrition standpoint. Will follow up next month.     Exercise:  water aerobics (60 min)/weighted exercises (45 minutes) 3-4x per week     Leela Chatterjee RDN, LD

## 2024-02-01 NOTE — LETTER
Hi Shoneia M Collins!    Thank you for choosing [facility] to guide you on your journey to Bariatric Surgery and a healthier you!      The following items remain for your bariatric surgery steps:   Psychological evaluation:  Please refer to the list of providers in your new patient handbook for scheduling.  If you are currently established with a mental health provider, you may see them for this evaluation if you wish; however, some providers may not feel comfortable performing this clearance, so you may need to reach out to one of the providers on the list.  This evaluation is only good for 6 months.     Initial lab work You should expect to provide a blood, urine, and potential stool sample at the lab.  If you do not provide all three, your lab work may be incomplete. You will need to be fasting for 8 hours prior to having these labs done. This includes a H. Pylori breath test to be completed. *Do not wait until the last minute to have your labs completed as this could potentially delay approval for surgery.*        Please direct any questions you might have to your navigator, Peg Sinha, at [phone].  Kindly,  Peg Sinha, RN

## 2024-02-03 ENCOUNTER — LAB (OUTPATIENT)
Dept: LAB | Facility: LAB | Age: 44
End: 2024-02-03
Payer: COMMERCIAL

## 2024-02-03 DIAGNOSIS — F12.90 USE OF CANNABIS: ICD-10-CM

## 2024-02-03 DIAGNOSIS — E66.01 CLASS 3 SEVERE OBESITY DUE TO EXCESS CALORIES WITHOUT SERIOUS COMORBIDITY WITH BODY MASS INDEX (BMI) OF 50.0 TO 59.9 IN ADULT (MULTI): ICD-10-CM

## 2024-02-03 DIAGNOSIS — Z98.84 BARIATRIC SURGERY STATUS: ICD-10-CM

## 2024-02-03 LAB
ALBUMIN SERPL BCP-MCNC: 4.3 G/DL (ref 3.4–5)
ALP SERPL-CCNC: 43 U/L (ref 33–110)
ALT SERPL W P-5'-P-CCNC: 13 U/L (ref 7–45)
AMPHETAMINES UR QL SCN: NORMAL
ANION GAP SERPL CALC-SCNC: 13 MMOL/L (ref 10–20)
APTT PPP: 31 SECONDS (ref 27–38)
AST SERPL W P-5'-P-CCNC: 15 U/L (ref 9–39)
BARBITURATES UR QL SCN: NORMAL
BASOPHILS # BLD AUTO: 0.04 X10*3/UL (ref 0–0.1)
BASOPHILS NFR BLD AUTO: 0.7 %
BENZODIAZ UR QL SCN: NORMAL
BILIRUB SERPL-MCNC: 0.5 MG/DL (ref 0–1.2)
BUN SERPL-MCNC: 10 MG/DL (ref 6–23)
BZE UR QL SCN: NORMAL
C PEPTIDE SERPL-MCNC: 2.1 NG/ML (ref 0.7–3.9)
CALCIUM SERPL-MCNC: 10 MG/DL (ref 8.6–10.6)
CANNABINOIDS UR QL SCN: NORMAL
CHLORIDE SERPL-SCNC: 104 MMOL/L (ref 98–107)
CHOLEST SERPL-MCNC: 197 MG/DL (ref 0–199)
CHOLESTEROL/HDL RATIO: 4.1
CO2 SERPL-SCNC: 25 MMOL/L (ref 21–32)
CREAT SERPL-MCNC: 0.7 MG/DL (ref 0.5–1.05)
EGFRCR SERPLBLD CKD-EPI 2021: >90 ML/MIN/1.73M*2
EOSINOPHIL # BLD AUTO: 0.04 X10*3/UL (ref 0–0.7)
EOSINOPHIL NFR BLD AUTO: 0.7 %
ERYTHROCYTE [DISTWIDTH] IN BLOOD BY AUTOMATED COUNT: 15.6 % (ref 11.5–14.5)
EST. AVERAGE GLUCOSE BLD GHB EST-MCNC: 105 MG/DL
FENTANYL+NORFENTANYL UR QL SCN: NORMAL
FERRITIN SERPL-MCNC: 246 NG/ML (ref 8–150)
FOLATE SERPL-MCNC: 15.9 NG/ML
GLUCOSE SERPL-MCNC: 92 MG/DL (ref 74–99)
HBA1C MFR BLD: 5.3 %
HCT VFR BLD AUTO: 41.4 % (ref 36–46)
HDLC SERPL-MCNC: 48.2 MG/DL
HGB BLD-MCNC: 12.9 G/DL (ref 12–16)
IMM GRANULOCYTES # BLD AUTO: 0.04 X10*3/UL (ref 0–0.7)
IMM GRANULOCYTES NFR BLD AUTO: 0.7 % (ref 0–0.9)
INR PPP: 1 (ref 0.9–1.1)
IRON SATN MFR SERPL: 24 % (ref 25–45)
IRON SERPL-MCNC: 74 UG/DL (ref 35–150)
LDLC SERPL CALC-MCNC: 136 MG/DL
LYMPHOCYTES # BLD AUTO: 2.62 X10*3/UL (ref 1.2–4.8)
LYMPHOCYTES NFR BLD AUTO: 45 %
MCH RBC QN AUTO: 25 PG (ref 26–34)
MCHC RBC AUTO-ENTMCNC: 31.2 G/DL (ref 32–36)
MCV RBC AUTO: 80 FL (ref 80–100)
MONOCYTES # BLD AUTO: 0.3 X10*3/UL (ref 0.1–1)
MONOCYTES NFR BLD AUTO: 5.2 %
NEUTROPHILS # BLD AUTO: 2.78 X10*3/UL (ref 1.2–7.7)
NEUTROPHILS NFR BLD AUTO: 47.7 %
NON HDL CHOLESTEROL: 149 MG/DL (ref 0–149)
NRBC BLD-RTO: 0 /100 WBCS (ref 0–0)
OPIATES UR QL SCN: NORMAL
OXYCODONE+OXYMORPHONE UR QL SCN: NORMAL
PCP UR QL SCN: NORMAL
PLATELET # BLD AUTO: 253 X10*3/UL (ref 150–450)
POTASSIUM SERPL-SCNC: 3.9 MMOL/L (ref 3.5–5.3)
PROT SERPL-MCNC: 7.9 G/DL (ref 6.4–8.2)
PROTHROMBIN TIME: 11.5 SECONDS (ref 9.8–12.8)
PTH-INTACT SERPL-MCNC: 30.6 PG/ML (ref 18.5–88)
RBC # BLD AUTO: 5.16 X10*6/UL (ref 4–5.2)
SODIUM SERPL-SCNC: 138 MMOL/L (ref 136–145)
TIBC SERPL-MCNC: 308 UG/DL (ref 240–445)
TRIGL SERPL-MCNC: 64 MG/DL (ref 0–149)
TSH SERPL-ACNC: 1.26 MIU/L (ref 0.44–3.98)
UIBC SERPL-MCNC: 234 UG/DL (ref 110–370)
UREA BREATH TEST QL: NEGATIVE
VIT B12 SERPL-MCNC: 489 PG/ML (ref 211–911)
VLDL: 13 MG/DL (ref 0–40)
WBC # BLD AUTO: 5.8 X10*3/UL (ref 4.4–11.3)

## 2024-02-03 PROCEDURE — 80323 ALKALOIDS NOS: CPT

## 2024-02-03 PROCEDURE — 80053 COMPREHEN METABOLIC PANEL: CPT

## 2024-02-03 PROCEDURE — 82728 ASSAY OF FERRITIN: CPT

## 2024-02-03 PROCEDURE — 84446 ASSAY OF VITAMIN E: CPT

## 2024-02-03 PROCEDURE — 84590 ASSAY OF VITAMIN A: CPT

## 2024-02-03 PROCEDURE — 85610 PROTHROMBIN TIME: CPT

## 2024-02-03 PROCEDURE — 80307 DRUG TEST PRSMV CHEM ANLYZR: CPT

## 2024-02-03 PROCEDURE — 80349 CANNABINOIDS NATURAL: CPT

## 2024-02-03 PROCEDURE — 85025 COMPLETE CBC W/AUTO DIFF WBC: CPT

## 2024-02-03 PROCEDURE — 80061 LIPID PANEL: CPT

## 2024-02-03 PROCEDURE — 82607 VITAMIN B-12: CPT

## 2024-02-03 PROCEDURE — 82746 ASSAY OF FOLIC ACID SERUM: CPT

## 2024-02-03 PROCEDURE — 83540 ASSAY OF IRON: CPT

## 2024-02-03 PROCEDURE — 84207 ASSAY OF VITAMIN B-6: CPT

## 2024-02-03 PROCEDURE — 83036 HEMOGLOBIN GLYCOSYLATED A1C: CPT

## 2024-02-03 PROCEDURE — 85730 THROMBOPLASTIN TIME PARTIAL: CPT

## 2024-02-03 PROCEDURE — 84425 ASSAY OF VITAMIN B-1: CPT

## 2024-02-03 PROCEDURE — 36415 COLL VENOUS BLD VENIPUNCTURE: CPT

## 2024-02-03 PROCEDURE — 83550 IRON BINDING TEST: CPT

## 2024-02-03 PROCEDURE — 84597 ASSAY OF VITAMIN K: CPT

## 2024-02-03 PROCEDURE — 83970 ASSAY OF PARATHORMONE: CPT

## 2024-02-03 PROCEDURE — 83013 H PYLORI (C-13) BREATH: CPT

## 2024-02-03 PROCEDURE — 84681 ASSAY OF C-PEPTIDE: CPT

## 2024-02-03 PROCEDURE — 84443 ASSAY THYROID STIM HORMONE: CPT

## 2024-02-05 ENCOUNTER — OFFICE VISIT (OUTPATIENT)
Dept: BEHAVIORAL HEALTH | Facility: HOSPITAL | Age: 44
End: 2024-02-05
Payer: COMMERCIAL

## 2024-02-05 DIAGNOSIS — F50.9 EATING DISORDER IN REMISSION: ICD-10-CM

## 2024-02-05 DIAGNOSIS — F43.89 REACTION TO CHRONIC STRESS: ICD-10-CM

## 2024-02-05 DIAGNOSIS — F12.91 HISTORY OF MARIJUANA USE: ICD-10-CM

## 2024-02-05 DIAGNOSIS — E66.01 CLASS 3 SEVERE OBESITY DUE TO EXCESS CALORIES WITHOUT SERIOUS COMORBIDITY WITH BODY MASS INDEX (BMI) OF 50.0 TO 59.9 IN ADULT (MULTI): ICD-10-CM

## 2024-02-05 PROCEDURE — 3008F BODY MASS INDEX DOCD: CPT | Performed by: PSYCHOLOGIST

## 2024-02-05 PROCEDURE — 90832 PSYTX W PT 30 MINUTES: CPT | Mod: AH,GT | Performed by: PSYCHOLOGIST

## 2024-02-05 PROCEDURE — 90832 PSYTX W PT 30 MINUTES: CPT | Performed by: PSYCHOLOGIST

## 2024-02-05 PROCEDURE — 1036F TOBACCO NON-USER: CPT | Performed by: PSYCHOLOGIST

## 2024-02-05 NOTE — LETTER
DATE: 2024    RE: Clearance for Shoneia Collins for bariatric surgery. : 1980    Dear Dr. Thong SCHMID conducted a behavioral health evaluation for bariatric surgery with Ms. Shoneia Collins on 2023. Shoneia denied a diagnostic history of a mental health disorder. Results from the evaluation and psychological assessments indicated that she meet criteria for night eating syndrome in remission. She last had an episode of night eating approximately a year ago. Ms. Trimble denied a diagnostic history of an eating disorder and did not meet criteria for binge eating, graze eating, bulimia, or anorexia or other specified eating disorder.     Results from the evaluation also indicated that she has been experiencing problems related to chronic stress. She will learn stress management skills at [facility]. Her anxiety screening feel in the minimal range of anxiety symptoms and her depression screening was negative. She denied a history of psychosis, mental health hospitalizations or self-injurious behaviors.  She denied a history of an alcohol or substance use disorder. Her last use of tobacco products was in . Since starting this program, she significantly reduced the frequency of alcohol consumption.  She was using cannabis. Her drug urine screening was negative on 24.     Shoneia has a good support system and has identified her adult children as her primary support persons. She reported hip pain and family-related stress, with somewhat effective coping skills. She is motivated for bariatric surgery and has implemented lifestyle changes.     Taken together, Shoneia is cleared for bariatric surgery from a behavioral health perspective. There are no known contraindications from a behavioral health perspective. However, we reserve the right to change our opinion based upon additional information.     Sincerely,     Carli Perez, PhD    Psychologist  Director of Behavioral Health Services for Bariatric Surgery and Weight Management

## 2024-02-05 NOTE — PROGRESS NOTES
Time started: 9:17 am  Times ended: 952  Total time spent: 35 minutes, virtual visit.     Verbal consent was requested and obtained from patient on this date for a telehealth visit.     We discussed that the note will be visible and others healthcare practitioners will have access. The patient has consented/has not consented to an unrestricted note.      She reported that she has been meeting with Leela Chatterjee RD.   Dietary habits: She has been setting timers to help her to eat slower and when to eat and to take sips of water. She stopped drinking her water fast. She has bought smaller bottles of water.  She has been journaling as well. She has been focusing on her protein intake. She continues to refrain from night eating.   She has not been worrying about cleaning her plate.   Her tox screen is negative for cannabis and other substances. Alcohol use: 2 drinks in January 2024. So she continues to significantly cut back on alcohol consumption.     She lost 9 pounds since December 11, 2023.     Shoneia is cleared for weight loss surgery from a behavioral health perspective.   Please refer to her clearance letter in her chart.     Follow up, post surgery scheduled for: May 21, 2024

## 2024-02-07 ENCOUNTER — TELEPHONE (OUTPATIENT)
Dept: SURGERY | Facility: CLINIC | Age: 44
End: 2024-02-07
Payer: COMMERCIAL

## 2024-02-07 LAB
A-TOCOPHEROL VIT E SERPL-MCNC: 10.1 MG/L (ref 5.5–18)
ANNOTATION COMMENT IMP: NORMAL
BETA+GAMMA TOCOPHEROL SERPL-MCNC: 1.4 MG/L (ref 0–6)
PYRIDOXAL PHOS SERPL-SCNC: 37.2 NMOL/L (ref 20–125)
RETINYL PALMITATE SERPL-MCNC: <0.02 MG/L (ref 0–0.1)
VIT A SERPL-MCNC: 0.48 MG/L (ref 0.3–1.2)

## 2024-02-07 NOTE — TELEPHONE ENCOUNTER
RN called and spoke to patient in regards to clearances for bariatric surgery. Patient believes that all clearances are complete. Patient made RN aware that she will be losing her insurance in April and wanted to know if she could have her surgery done prior to April. This RN looked at Dr. Benito' schedule and made the assessment that her schedule is full for surgery until April. RN made patient aware and educated patient to call her insurance company and speak to them. Patient verbalized understanding and stated she would let RN know what they say.

## 2024-02-08 ENCOUNTER — TELEPHONE (OUTPATIENT)
Dept: SURGERY | Facility: CLINIC | Age: 44
End: 2024-02-08
Payer: COMMERCIAL

## 2024-02-08 LAB
ANABASINE UR-MCNC: <5 NG/ML
CARBOXYTHC UR-MCNC: <15 NG/ML
COTININE UR-MCNC: <15 NG/ML
NICOTINE UR-MCNC: <15 NG/ML
PHYTONADIONE SERPL-MCNC: 0.23 NMOL/L (ref 0.22–4.88)
TRANS-3-OH-COTININE UR-MCNC: <50 NG/ML
VIT B1 PYROPHOSHATE BLD-SCNC: 91 NMOL/L (ref 70–180)

## 2024-02-08 NOTE — TELEPHONE ENCOUNTER
Patient called RN stating that she called her insurance company and they stated that her insurance is good until October 2024.RN stated to patient that we are still waiting for a couple more labs to result before we can clear her and submit to insurance. Patient verbalized understanding.

## 2024-03-04 ENCOUNTER — TELEPHONE (OUTPATIENT)
Dept: SURGERY | Facility: CLINIC | Age: 44
End: 2024-03-04
Payer: COMMERCIAL

## 2024-03-06 ENCOUNTER — NUTRITION (OUTPATIENT)
Dept: SURGERY | Facility: CLINIC | Age: 44
End: 2024-03-06
Payer: COMMERCIAL

## 2024-03-06 VITALS — BODY MASS INDEX: 51.53 KG/M2 | HEIGHT: 67 IN

## 2024-03-06 NOTE — PROGRESS NOTES
PREOPERATIVE, MULTIDISCIPLINARY, MEDICALLY SUPERVISED, REDUCED CALORIE DIET, BEHAVIOR MODIFICATION AND EXERCISE PROGRAM    S:  The pt noted that her nutrition behaviors have become more habitual to her. B: fruit and greek yogurt, L: salad w/chicken (3-4oz) or soup (broccoli cheddar or chicken noodle or butternut squash) w/chicken, D: turkey burger (3-4oz) w/veggies, S: protein bar. Has been drinking water and herbal tea and has been meeting her goals. Continues to take a MVI. The pt has not gotten a weight recently, but feels like she has lost weight.     O:    Wt: n/a     Goal: 5% body weight loss over the course of program    Dietary recommendation:   1. Continue to drink 64oz of noncarbonated/caffeine free/sugar free beverages   2. Practice the 30-30-30 rule by drinking between meals.  3. Structure your meal plan - have 3 meals and 1 snack daily.  4. Have balanced meals that always contain a good source of protein.  5. Increase intake of non-starchy vegetables.  Have 5 servings fruits and vegetables daily.   6. Take a multivitamin daily.  7. Continue chair exercises and water aerobics. Increase physical activity by 10-15 minutes to an end goal of 60 minutes 5 x per week.    Group Topic: Going Lean With Protein  Behavioral recommendation: Pt is encouraged to identify and recall sources of lean protein.    A/P: Pt appears to have a good understanding of how to incorporate lean protein into their daily meal pattern.  Pt has a goal to add a lean protein source to each meal. The pt continues to well. Pt noted that her surgery date is 4/22. However, it is not yet documented. If this is the case, she will need to start the 2 week pre op diet on 4/8. Will be attending the NBP class to review this diet on 3/26.     Exercise: chair exercises for 15 minutes for 5x per week and water aerobics for 45-60 minutes 3x per week     Leela Chatterjee RDN, LD

## 2024-03-07 ENCOUNTER — APPOINTMENT (OUTPATIENT)
Dept: SURGERY | Facility: CLINIC | Age: 44
End: 2024-03-07
Payer: COMMERCIAL

## 2024-03-11 ENCOUNTER — APPOINTMENT (OUTPATIENT)
Dept: VASCULAR MEDICINE | Facility: HOSPITAL | Age: 44
End: 2024-03-11
Payer: COMMERCIAL

## 2024-03-12 ENCOUNTER — APPOINTMENT (OUTPATIENT)
Dept: CARDIOLOGY | Facility: CLINIC | Age: 44
End: 2024-03-12
Payer: COMMERCIAL

## 2024-03-14 PROBLEM — F12.91: Status: ACTIVE | Noted: 2024-02-05

## 2024-03-14 PROBLEM — R10.84 GENERALIZED ABDOMINAL PAIN: Status: ACTIVE | Noted: 2023-08-02

## 2024-03-14 PROBLEM — S76.019A STRAIN OF MUSCLE OF HIP: Status: ACTIVE | Noted: 2024-03-14

## 2024-03-14 PROBLEM — M25.551 PAIN OF RIGHT HIP JOINT: Status: ACTIVE | Noted: 2023-01-23

## 2024-03-14 PROBLEM — R22.42 LOCALIZED SWELLING OF LEFT LOWER LEG: Status: ACTIVE | Noted: 2024-03-14

## 2024-03-18 ENCOUNTER — PREP FOR PROCEDURE (OUTPATIENT)
Dept: SURGERY | Facility: CLINIC | Age: 44
End: 2024-03-18
Payer: COMMERCIAL

## 2024-03-18 DIAGNOSIS — E66.01 MORBID OBESITY DUE TO EXCESS CALORIES (MULTI): Primary | ICD-10-CM

## 2024-03-18 DIAGNOSIS — E66.01 MORBID OBESITY (MULTI): ICD-10-CM

## 2024-03-18 RX ORDER — HEPARIN SODIUM 5000 [USP'U]/ML
5000 INJECTION, SOLUTION INTRAVENOUS; SUBCUTANEOUS ONCE
Status: CANCELLED | OUTPATIENT
Start: 2024-03-18 | End: 2024-03-18

## 2024-03-18 RX ORDER — SODIUM CHLORIDE, SODIUM LACTATE, POTASSIUM CHLORIDE, CALCIUM CHLORIDE 600; 310; 30; 20 MG/100ML; MG/100ML; MG/100ML; MG/100ML
100 INJECTION, SOLUTION INTRAVENOUS CONTINUOUS
Status: CANCELLED | OUTPATIENT
Start: 2024-03-18

## 2024-03-18 RX ORDER — CELECOXIB 400 MG/1
400 CAPSULE ORAL ONCE
Status: CANCELLED | OUTPATIENT
Start: 2024-03-18 | End: 2024-03-18

## 2024-03-18 RX ORDER — SCOLOPAMINE TRANSDERMAL SYSTEM 1 MG/1
1 PATCH, EXTENDED RELEASE TRANSDERMAL ONCE
Status: CANCELLED | OUTPATIENT
Start: 2024-03-18 | End: 2024-03-18

## 2024-03-18 RX ORDER — ACETAMINOPHEN 325 MG/1
650 TABLET ORAL ONCE
Status: CANCELLED | OUTPATIENT
Start: 2024-03-18 | End: 2024-03-18

## 2024-03-18 RX ORDER — GABAPENTIN 600 MG/1
600 TABLET ORAL ONCE
Status: CANCELLED | OUTPATIENT
Start: 2024-03-18 | End: 2024-03-18

## 2024-03-18 RX ORDER — APREPITANT 40 MG/1
40 CAPSULE ORAL ONCE
Status: CANCELLED | OUTPATIENT
Start: 2024-03-18 | End: 2024-03-18

## 2024-03-26 ENCOUNTER — TELEMEDICINE CLINICAL SUPPORT (OUTPATIENT)
Dept: SURGERY | Facility: CLINIC | Age: 44
End: 2024-03-26
Payer: COMMERCIAL

## 2024-03-26 VITALS — HEIGHT: 67 IN | BODY MASS INDEX: 51.53 KG/M2

## 2024-04-09 ENCOUNTER — TELEPHONE (OUTPATIENT)
Dept: SURGERY | Facility: CLINIC | Age: 44
End: 2024-04-09
Payer: COMMERCIAL

## 2024-04-09 DIAGNOSIS — R10.84 GENERALIZED ABDOMINAL PAIN: ICD-10-CM

## 2024-04-09 DIAGNOSIS — E53.8 VITAMIN B12 DEFICIENCY: ICD-10-CM

## 2024-04-09 DIAGNOSIS — M16.0 PRIMARY OSTEOARTHRITIS OF BOTH HIPS: ICD-10-CM

## 2024-04-09 DIAGNOSIS — E66.01 CLASS 3 SEVERE OBESITY DUE TO EXCESS CALORIES WITHOUT SERIOUS COMORBIDITY WITH BODY MASS INDEX (BMI) OF 50.0 TO 59.9 IN ADULT (MULTI): ICD-10-CM

## 2024-04-09 DIAGNOSIS — Z98.84 BARIATRIC SURGERY STATUS: ICD-10-CM

## 2024-04-09 DIAGNOSIS — Z90.710 S/P TAH (TOTAL ABDOMINAL HYSTERECTOMY): ICD-10-CM

## 2024-04-09 DIAGNOSIS — R10.32 LEFT LOWER QUADRANT ABDOMINAL PAIN: ICD-10-CM

## 2024-04-09 DIAGNOSIS — M16.11 PRIMARY OSTEOARTHRITIS OF RIGHT HIP: ICD-10-CM

## 2024-04-09 RX ORDER — FERROUS SULFATE 325(65) MG
1 TABLET ORAL DAILY
Qty: 90 TABLET | Refills: 3 | Status: SHIPPED | OUTPATIENT
Start: 2024-04-09 | End: 2025-04-09

## 2024-04-09 RX ORDER — IBUPROFEN 200 MG
1 CAPSULE ORAL DAILY
Qty: 30 TABLET | Refills: 11 | Status: SHIPPED | OUTPATIENT
Start: 2024-04-09 | End: 2025-04-09

## 2024-04-09 RX ORDER — BISMUTH SUBSALICYLATE 262 MG
1 TABLET,CHEWABLE ORAL DAILY
Qty: 90 TABLET | Refills: 0 | Status: SHIPPED | OUTPATIENT
Start: 2024-04-09

## 2024-04-09 RX ORDER — UBIDECARENONE 75 MG
500 CAPSULE ORAL DAILY
Qty: 90 TABLET | Refills: 0 | Status: SHIPPED | OUTPATIENT
Start: 2024-04-09 | End: 2024-05-10 | Stop reason: WASHOUT

## 2024-04-09 RX ORDER — FOLIC ACID 1 MG/1
1 TABLET ORAL DAILY
Qty: 90 TABLET | Refills: 0 | Status: SHIPPED | OUTPATIENT
Start: 2024-04-09 | End: 2024-05-10 | Stop reason: WASHOUT

## 2024-04-09 NOTE — TELEPHONE ENCOUNTER
Called pt to discuss 2 week pre op diet which she started on 4/8. Also reviewed vitamins and post op behaviors. Encouraged the pt to contact this RD if she has any questions.     Leela Chatterjee MS, RD, LD  Phone: [phone]

## 2024-04-09 NOTE — TELEPHONE ENCOUNTER
Thank you for speaking with me earlier today & confirming your scheduled visit with Dr. Benito on 4/12/24 at [facility] (inside [facility], main floor in the Specialty Clinic).  Please ARRIVE BY:  12:45 PM for the required Final Pre-Op Class with Dr. Benito and Peg RN Coordinator.  You will be ordered Pre-Op Labs at your appointment to be completed afterwards, there is no need to fast for the labs.  There is  Parking available with a cost of around $5.00 at the Main Entrance.  We look forward to seeing you, Joyce Haynes, [facility] Nurse.

## 2024-04-12 ENCOUNTER — OFFICE VISIT (OUTPATIENT)
Dept: SURGERY | Facility: CLINIC | Age: 44
End: 2024-04-12
Payer: COMMERCIAL

## 2024-04-12 ENCOUNTER — APPOINTMENT (OUTPATIENT)
Dept: SURGERY | Facility: CLINIC | Age: 44
End: 2024-04-12
Payer: COMMERCIAL

## 2024-04-12 VITALS
BODY MASS INDEX: 45.99 KG/M2 | HEIGHT: 67 IN | WEIGHT: 293 LBS | SYSTOLIC BLOOD PRESSURE: 132 MMHG | OXYGEN SATURATION: 97 % | RESPIRATION RATE: 18 BRPM | DIASTOLIC BLOOD PRESSURE: 84 MMHG | HEART RATE: 72 BPM

## 2024-04-12 DIAGNOSIS — Z98.84 S/P GASTRIC BYPASS: ICD-10-CM

## 2024-04-12 DIAGNOSIS — K21.9 GASTROESOPHAGEAL REFLUX DISEASE, UNSPECIFIED WHETHER ESOPHAGITIS PRESENT: ICD-10-CM

## 2024-04-12 DIAGNOSIS — I10 BENIGN HYPERTENSION: ICD-10-CM

## 2024-04-12 DIAGNOSIS — E11.9 TYPE 2 DIABETES MELLITUS WITHOUT COMPLICATION, WITHOUT LONG-TERM CURRENT USE OF INSULIN (MULTI): ICD-10-CM

## 2024-04-12 DIAGNOSIS — E66.01 CLASS 3 SEVERE OBESITY DUE TO EXCESS CALORIES WITHOUT SERIOUS COMORBIDITY WITH BODY MASS INDEX (BMI) OF 50.0 TO 59.9 IN ADULT (MULTI): Primary | ICD-10-CM

## 2024-04-12 DIAGNOSIS — M16.0 PRIMARY OSTEOARTHRITIS OF BOTH HIPS: ICD-10-CM

## 2024-04-12 PROCEDURE — 1036F TOBACCO NON-USER: CPT | Performed by: SURGERY

## 2024-04-12 PROCEDURE — 3044F HG A1C LEVEL LT 7.0%: CPT | Performed by: SURGERY

## 2024-04-12 PROCEDURE — 3079F DIAST BP 80-89 MM HG: CPT | Performed by: SURGERY

## 2024-04-12 PROCEDURE — 99214 OFFICE O/P EST MOD 30 MIN: CPT | Performed by: SURGERY

## 2024-04-12 PROCEDURE — 3050F LDL-C >= 130 MG/DL: CPT | Performed by: SURGERY

## 2024-04-12 PROCEDURE — 3075F SYST BP GE 130 - 139MM HG: CPT | Performed by: SURGERY

## 2024-04-12 PROCEDURE — 3008F BODY MASS INDEX DOCD: CPT | Performed by: SURGERY

## 2024-04-12 RX ORDER — OMEPRAZOLE 40 MG/1
40 CAPSULE, DELAYED RELEASE ORAL
Qty: 30 CAPSULE | Refills: 5 | Status: SHIPPED | OUTPATIENT
Start: 2024-04-12

## 2024-04-12 RX ORDER — OXYCODONE HYDROCHLORIDE 5 MG/1
5 TABLET ORAL EVERY 6 HOURS PRN
Qty: 24 TABLET | Refills: 0 | Status: SHIPPED | OUTPATIENT
Start: 2024-04-12 | End: 2024-04-30

## 2024-04-12 RX ORDER — ONDANSETRON 4 MG/1
4 TABLET, ORALLY DISINTEGRATING ORAL EVERY 6 HOURS PRN
Qty: 15 TABLET | Refills: 1 | Status: SHIPPED | OUTPATIENT
Start: 2024-04-12 | End: 2024-05-25

## 2024-04-12 ASSESSMENT — PAIN SCALES - GENERAL: PAINLEVEL: 0-NO PAIN

## 2024-04-12 NOTE — H&P (VIEW-ONLY)
BARIATRIC SURGERY PREOPERATIVE VISIT    Date: 24  Time: 2:45 PM      Name: Shoneia M Collins    MRN: [id number]    This is a 44 y.o. y.o. female with morbid obesity (Body mass index is 50.48 kg/m².) who plans to undergo Laparoscopic bakari en y gastric bypass 42940, possible hiatal hernia repair surgery. They have completed a rigorous preoperative medical work-up and bariatric surgery educational program.     PMH:   Patient Active Problem List   Diagnosis    Anemia    Anosmia    Anovular menstruation    Arthritis    Benign hypertension    Candidal vulvovaginitis    Cervical lymphadenopathy    Snoring    Dysmenorrhea    Fatigue    Hidradenitis suppurativa    Class 3 severe obesity due to excess calories without serious comorbidity with body mass index (BMI) of 50.0 to 59.9 in adult (Multi)    Mass of breast, right    Menorrhagia    Primary osteoarthritis of both hips    Polyphagia    Sleep apnea    Tinea corporis    Varicose veins of leg with edema, bilateral    Vitamin B12 deficiency    Vitamin D deficiency    Thrombophlebitis    S/P GAVIN (total abdominal hysterectomy)    Left lower quadrant abdominal pain    Back pain    Hemorrhage of varicose veins of left lower extremity    Hyperglycemia    Chronic left shoulder pain    Lingual tonsil hypertrophy    Smell and taste disorder    Subacromial bursitis of left shoulder joint    Varicose veins with pain    Inadequate caloric intake    Inadequate dietary caloric intake    Bariatric surgery status    Umbilical hernia without obstruction and without gangrene    Use of cannabis    Eating disorder in remission    Reaction to chronic stress    History of marijuana use    Cannabis use, unspecified, in remission    Generalized abdominal pain    Localized swelling of left lower leg    Pain of right hip joint    Strain of muscle of hip    Morbid obesity (Multi)       PSH:   Past Surgical History:   Procedure Laterality Date     SECTION, CLASSIC  11/10/2014      Section    GALLBLADDER SURGERY  11/10/2014    Gallbladder Surgery    HYSTERECTOMY      OTHER SURGICAL HISTORY  2021    Laparoscopic hysterectomy    OTHER SURGICAL HISTORY  2021    Bilateral salpingectomy    OTHER SURGICAL HISTORY  2021    Cystoscopy    TONSILLECTOMY      TUBAL LIGATION  2017    Tubal Ligation       Social hx:   Social History     Socioeconomic History    Marital status: Single     Spouse name: Not on file    Number of children: Not on file    Years of education: Not on file    Highest education level: Not on file   Occupational History    Not on file   Tobacco Use    Smoking status: Former     Current packs/day: 0.00     Average packs/day: 1 pack/day for 2.0 years (2.0 ttl pk-yrs)     Types: Cigarettes     Start date:      Quit date:      Years since quittin.2     Passive exposure: Never    Smokeless tobacco: Never    Tobacco comments:     24:  Verified verbally [facility]   Vaping Use    Vaping status: Never Used   Substance and Sexual Activity    Alcohol use: Not Currently     Comment: 24: Verbalizes hx socially, rarely  [facility]    Drug use: Yes     Types: Marijuana     Comment: 24:  Denies current [facility] LPN    Sexual activity: Yes     Partners: Male     Birth control/protection: Female Sterilization, Condom Male   Other Topics Concern    Not on file   Social History Narrative    Not on file     Social Determinants of Health     Financial Resource Strain: Not on file   Food Insecurity: No Food Insecurity (2023)    Hunger Vital Sign     Worried About Running Out of Food in the Last Year: Never true     Ran Out of Food in the Last Year: Never true   Transportation Needs: Not on file   Physical Activity: Not on file   Stress: Not on file   Social Connections: Not on file   Intimate Partner Violence: Not At Risk (10/6/2023)    Humiliation, Afraid, Rape, and Kick questionnaire     Fear of Current or Ex-Partner: No     Emotionally Abused: No      Physically Abused: No     Sexually Abused: No   Housing Stability: Not on file       Initial weight: 339 lbs  Current weight:   Vitals:    04/12/24 1336   Weight: 146 kg (322 lb 4.8 oz)       Preop Clearances:  Cardiac: Cleared  Pulmonary: Cleared  Psych: Cleared    History of Clotting Disorder: none  Anticoagulation plan: n/a    Other: n/a    Sleep Study: No apnea    EGD: Findings: esophagitis      MEDICATIONS:  Prior to Admission Medications:    Current Outpatient Medications:     clindamycin (Cleocin T) 1 % lotion, Apply topically if needed., Disp: , Rfl:     cyanocobalamin (Vitamin B-12) 500 mcg tablet, Take 1 tablet (500 mcg) by mouth once daily., Disp: 90 tablet, Rfl: 0    famotidine (Pepcid) 20 mg tablet, Take 1 tablet (20 mg) by mouth once daily at bedtime., Disp: 90 tablet, Rfl: 1    ferrous sulfate, 325 mg ferrous sulfate, tablet, Take 1 tablet by mouth once daily., Disp: 90 tablet, Rfl: 3    fluticasone (Flonase) 50 mcg/actuation nasal spray, Administer into affected nostril(s) if needed., Disp: , Rfl:     folic acid (Folvite) 1 mg tablet, Take 1 tablet (1 mg) by mouth once daily., Disp: 90 tablet, Rfl: 0    ketoconazole (NIZOral) 2 % cream, Apply topically if needed., Disp: , Rfl:     loratadine (Claritin Reditabs) 10 mg disintegrating tablet, Take 1 tablet (10 mg) by mouth once daily., Disp: , Rfl:     multivitamin tablet, Take 1 tablet by mouth once daily., Disp: 90 tablet, Rfl: 0    tiZANidine (Zanaflex) 4 mg capsule, Take 1 capsule (4 mg) by mouth once daily at bedtime., Disp: 30 capsule, Rfl: 11    calcium citrate (Calcitrate) 200 mg (950 mg) tablet, Take 1 tablet (200 mg) by mouth once daily. (Patient not taking: Reported on 4/12/2024), Disp: 30 tablet, Rfl: 11    Fiber Therapy, m-cellulose, 500 mg tablet, , Disp: , Rfl:     ALLERGIES:  Allergies   Allergen Reactions    Pollen Extracts Runny nose       REVIEW OF SYSTEMS:  GENERAL: Obese. Negative for malaise, significant weight loss and  fever  NECK: Negative for lumps, goiter, pain and significant neck swelling  RESPIRATORY: Negative for cough, wheezing or shortness of breath.  CARDIOVASCULAR: Negative for chest pain, leg swelling or palpitations.  GI: Negative for abdominal discomfort, blood in stools or black stools or change in bowel habits  : No history of dysuria, frequency or incontinence  MUSCULOSKELETAL: Negative for joint pain or swelling, back pain or muscle pain.  SKIN: Negative for lesions, rash, and itching.  PSYCH: Negative for sleep disturbance, mood disorder and recent psychosocial stressors.  ENDOCRINE: Negative for cold or heat intolerance, polyuria, polydipsia and goiter.    PHYSICAL EXAM:  Visit Vitals  Resp 18       General appearance: obese  Skin: warm, no erythema or rashes  Lungs: clear to percussion and auscultation  Heart: regular rhythm and S1, S2 normal  Abdomen: soft, non-tender, no masses, no organomegaly  Extremities: Normal exam of the extremities. No swelling or pain.    No results found for this or any previous visit (from the past 24 hour(s)).    IMPRESSION:  Shoneia M Collins is a 44 y.o. y.o. female with a BMI of Body mass index is 50.48 kg/m²..    They have been preoperatively evaluated and deemed to be an appropriate candidate for bariatric surgery.  Surgery Type: Laparoscopic bakari en y gastric bypass 54905    All testing reviewed.  All clearances contained.    PLAN:    VTE risk calculator score of 0.27%, so the patient will require none weeks of extended VTE prophylaxis with Lovenox subcutaneous.    The risks of Laparoscopic bakari en y gastric bypass 14696 possible hiatal hernia repair surgery including bleeding, leak, wound infection, dehydration, ulcers, internal hernia, DVT/PE, prolonged nausea/vomiting, incomplete resolution of associated medical conditions, reflux, weight regain, vitamin/mineral deficiencies, and death have been explained to the patient and Shoneia M Collins has expressed understanding  and acceptance of them.    The benefits of the above surgery including weight loss, improvement/resolution of associated medical and mental health conditions, improved mobility, and decreased mortality have been explained the the patient and Shoneia M Collins has expressed understanding and acceptance of them.    Operative and blood transfusion consent forms were signed by the patient and witnessed today.    Prescriptions for all required post-operative home medications were sent to the [facility] pharmacy today and will be delivered to the patient's bedside on the day of discharge.    Further education was provided on day of surgery instructions and what to expect from the inpatient admission after surgery.    Luis Armando Benito MD  Bariatric and Minimally Invasive General Surgery

## 2024-04-12 NOTE — PROGRESS NOTES
BARIATRIC SURGERY PREOPERATIVE VISIT    Date: 24  Time: 2:45 PM      Name: Shoneia M Collins    MRN: [id number]    This is a 44 y.o. y.o. female with morbid obesity (Body mass index is 50.48 kg/m².) who plans to undergo Laparoscopic bakari en y gastric bypass 96959, possible hiatal hernia repair surgery. They have completed a rigorous preoperative medical work-up and bariatric surgery educational program.     PMH:   Patient Active Problem List   Diagnosis    Anemia    Anosmia    Anovular menstruation    Arthritis    Benign hypertension    Candidal vulvovaginitis    Cervical lymphadenopathy    Snoring    Dysmenorrhea    Fatigue    Hidradenitis suppurativa    Class 3 severe obesity due to excess calories without serious comorbidity with body mass index (BMI) of 50.0 to 59.9 in adult (Multi)    Mass of breast, right    Menorrhagia    Primary osteoarthritis of both hips    Polyphagia    Sleep apnea    Tinea corporis    Varicose veins of leg with edema, bilateral    Vitamin B12 deficiency    Vitamin D deficiency    Thrombophlebitis    S/P GAVIN (total abdominal hysterectomy)    Left lower quadrant abdominal pain    Back pain    Hemorrhage of varicose veins of left lower extremity    Hyperglycemia    Chronic left shoulder pain    Lingual tonsil hypertrophy    Smell and taste disorder    Subacromial bursitis of left shoulder joint    Varicose veins with pain    Inadequate caloric intake    Inadequate dietary caloric intake    Bariatric surgery status    Umbilical hernia without obstruction and without gangrene    Use of cannabis    Eating disorder in remission    Reaction to chronic stress    History of marijuana use    Cannabis use, unspecified, in remission    Generalized abdominal pain    Localized swelling of left lower leg    Pain of right hip joint    Strain of muscle of hip    Morbid obesity (Multi)       PSH:   Past Surgical History:   Procedure Laterality Date     SECTION, CLASSIC  11/10/2014      Section    GALLBLADDER SURGERY  11/10/2014    Gallbladder Surgery    HYSTERECTOMY      OTHER SURGICAL HISTORY  2021    Laparoscopic hysterectomy    OTHER SURGICAL HISTORY  2021    Bilateral salpingectomy    OTHER SURGICAL HISTORY  2021    Cystoscopy    TONSILLECTOMY      TUBAL LIGATION  2017    Tubal Ligation       Social hx:   Social History     Socioeconomic History    Marital status: Single     Spouse name: Not on file    Number of children: Not on file    Years of education: Not on file    Highest education level: Not on file   Occupational History    Not on file   Tobacco Use    Smoking status: Former     Current packs/day: 0.00     Average packs/day: 1 pack/day for 2.0 years (2.0 ttl pk-yrs)     Types: Cigarettes     Start date:      Quit date:      Years since quittin.2     Passive exposure: Never    Smokeless tobacco: Never    Tobacco comments:     24:  Verified verbally [facility]   Vaping Use    Vaping status: Never Used   Substance and Sexual Activity    Alcohol use: Not Currently     Comment: 24: Verbalizes hx socially, rarely  [facility]    Drug use: Yes     Types: Marijuana     Comment: 24:  Denies current [facility] LPN    Sexual activity: Yes     Partners: Male     Birth control/protection: Female Sterilization, Condom Male   Other Topics Concern    Not on file   Social History Narrative    Not on file     Social Determinants of Health     Financial Resource Strain: Not on file   Food Insecurity: No Food Insecurity (2023)    Hunger Vital Sign     Worried About Running Out of Food in the Last Year: Never true     Ran Out of Food in the Last Year: Never true   Transportation Needs: Not on file   Physical Activity: Not on file   Stress: Not on file   Social Connections: Not on file   Intimate Partner Violence: Not At Risk (10/6/2023)    Humiliation, Afraid, Rape, and Kick questionnaire     Fear of Current or Ex-Partner: No     Emotionally Abused: No      Physically Abused: No     Sexually Abused: No   Housing Stability: Not on file       Initial weight: 339 lbs  Current weight:   Vitals:    04/12/24 1336   Weight: 146 kg (322 lb 4.8 oz)       Preop Clearances:  Cardiac: Cleared  Pulmonary: Cleared  Psych: Cleared    History of Clotting Disorder: none  Anticoagulation plan: n/a    Other: n/a    Sleep Study: No apnea    EGD: Findings: esophagitis      MEDICATIONS:  Prior to Admission Medications:    Current Outpatient Medications:     clindamycin (Cleocin T) 1 % lotion, Apply topically if needed., Disp: , Rfl:     cyanocobalamin (Vitamin B-12) 500 mcg tablet, Take 1 tablet (500 mcg) by mouth once daily., Disp: 90 tablet, Rfl: 0    famotidine (Pepcid) 20 mg tablet, Take 1 tablet (20 mg) by mouth once daily at bedtime., Disp: 90 tablet, Rfl: 1    ferrous sulfate, 325 mg ferrous sulfate, tablet, Take 1 tablet by mouth once daily., Disp: 90 tablet, Rfl: 3    fluticasone (Flonase) 50 mcg/actuation nasal spray, Administer into affected nostril(s) if needed., Disp: , Rfl:     folic acid (Folvite) 1 mg tablet, Take 1 tablet (1 mg) by mouth once daily., Disp: 90 tablet, Rfl: 0    ketoconazole (NIZOral) 2 % cream, Apply topically if needed., Disp: , Rfl:     loratadine (Claritin Reditabs) 10 mg disintegrating tablet, Take 1 tablet (10 mg) by mouth once daily., Disp: , Rfl:     multivitamin tablet, Take 1 tablet by mouth once daily., Disp: 90 tablet, Rfl: 0    tiZANidine (Zanaflex) 4 mg capsule, Take 1 capsule (4 mg) by mouth once daily at bedtime., Disp: 30 capsule, Rfl: 11    calcium citrate (Calcitrate) 200 mg (950 mg) tablet, Take 1 tablet (200 mg) by mouth once daily. (Patient not taking: Reported on 4/12/2024), Disp: 30 tablet, Rfl: 11    Fiber Therapy, m-cellulose, 500 mg tablet, , Disp: , Rfl:     ALLERGIES:  Allergies   Allergen Reactions    Pollen Extracts Runny nose       REVIEW OF SYSTEMS:  GENERAL: Obese. Negative for malaise, significant weight loss and  fever  NECK: Negative for lumps, goiter, pain and significant neck swelling  RESPIRATORY: Negative for cough, wheezing or shortness of breath.  CARDIOVASCULAR: Negative for chest pain, leg swelling or palpitations.  GI: Negative for abdominal discomfort, blood in stools or black stools or change in bowel habits  : No history of dysuria, frequency or incontinence  MUSCULOSKELETAL: Negative for joint pain or swelling, back pain or muscle pain.  SKIN: Negative for lesions, rash, and itching.  PSYCH: Negative for sleep disturbance, mood disorder and recent psychosocial stressors.  ENDOCRINE: Negative for cold or heat intolerance, polyuria, polydipsia and goiter.    PHYSICAL EXAM:  Visit Vitals  Resp 18       General appearance: obese  Skin: warm, no erythema or rashes  Lungs: clear to percussion and auscultation  Heart: regular rhythm and S1, S2 normal  Abdomen: soft, non-tender, no masses, no organomegaly  Extremities: Normal exam of the extremities. No swelling or pain.    No results found for this or any previous visit (from the past 24 hour(s)).    IMPRESSION:  Shoneia M Collins is a 44 y.o. y.o. female with a BMI of Body mass index is 50.48 kg/m²..    They have been preoperatively evaluated and deemed to be an appropriate candidate for bariatric surgery.  Surgery Type: Laparoscopic bakari en y gastric bypass 92217    All testing reviewed.  All clearances contained.    PLAN:    VTE risk calculator score of 0.27%, so the patient will require none weeks of extended VTE prophylaxis with Lovenox subcutaneous.    The risks of Laparoscopic bakari en y gastric bypass 30831 possible hiatal hernia repair surgery including bleeding, leak, wound infection, dehydration, ulcers, internal hernia, DVT/PE, prolonged nausea/vomiting, incomplete resolution of associated medical conditions, reflux, weight regain, vitamin/mineral deficiencies, and death have been explained to the patient and Shoneia M Collins has expressed understanding  and acceptance of them.    The benefits of the above surgery including weight loss, improvement/resolution of associated medical and mental health conditions, improved mobility, and decreased mortality have been explained the the patient and Shoneia M Collins has expressed understanding and acceptance of them.    Operative and blood transfusion consent forms were signed by the patient and witnessed today.    Prescriptions for all required post-operative home medications were sent to the [facility] pharmacy today and will be delivered to the patient's bedside on the day of discharge.    Further education was provided on day of surgery instructions and what to expect from the inpatient admission after surgery.    Luis Armando Benito MD  Bariatric and Minimally Invasive General Surgery

## 2024-04-18 ENCOUNTER — ANESTHESIA EVENT (OUTPATIENT)
Dept: OPERATING ROOM | Facility: HOSPITAL | Age: 44
End: 2024-04-18
Payer: COMMERCIAL

## 2024-04-19 PROCEDURE — RXMED WILLOW AMBULATORY MEDICATION CHARGE

## 2024-04-20 ENCOUNTER — LAB (OUTPATIENT)
Dept: LAB | Facility: LAB | Age: 44
End: 2024-04-20
Payer: COMMERCIAL

## 2024-04-20 ENCOUNTER — LAB REQUISITION (OUTPATIENT)
Dept: LAB | Facility: HOSPITAL | Age: 44
End: 2024-04-20
Payer: COMMERCIAL

## 2024-04-20 DIAGNOSIS — E11.9 TYPE 2 DIABETES MELLITUS WITHOUT COMPLICATION, WITHOUT LONG-TERM CURRENT USE OF INSULIN (MULTI): ICD-10-CM

## 2024-04-20 DIAGNOSIS — E66.01 MORBID (SEVERE) OBESITY DUE TO EXCESS CALORIES (MULTI): ICD-10-CM

## 2024-04-20 DIAGNOSIS — I10 BENIGN HYPERTENSION: ICD-10-CM

## 2024-04-20 DIAGNOSIS — E11.9 TYPE 2 DIABETES MELLITUS WITHOUT COMPLICATIONS (MULTI): ICD-10-CM

## 2024-04-20 DIAGNOSIS — K21.9 GASTROESOPHAGEAL REFLUX DISEASE, UNSPECIFIED WHETHER ESOPHAGITIS PRESENT: ICD-10-CM

## 2024-04-20 DIAGNOSIS — M16.0 BILATERAL PRIMARY OSTEOARTHRITIS OF HIP: ICD-10-CM

## 2024-04-20 DIAGNOSIS — K21.9 GASTRO-ESOPHAGEAL REFLUX DISEASE WITHOUT ESOPHAGITIS: ICD-10-CM

## 2024-04-20 DIAGNOSIS — E66.01 CLASS 3 SEVERE OBESITY DUE TO EXCESS CALORIES WITHOUT SERIOUS COMORBIDITY WITH BODY MASS INDEX (BMI) OF 50.0 TO 59.9 IN ADULT (MULTI): ICD-10-CM

## 2024-04-20 DIAGNOSIS — M16.0 PRIMARY OSTEOARTHRITIS OF BOTH HIPS: ICD-10-CM

## 2024-04-20 DIAGNOSIS — I10 ESSENTIAL (PRIMARY) HYPERTENSION: ICD-10-CM

## 2024-04-20 LAB
ABO GROUP (TYPE) IN BLOOD: NORMAL
ALBUMIN SERPL BCP-MCNC: 3.9 G/DL (ref 3.4–5)
ALP SERPL-CCNC: 40 U/L (ref 33–110)
ALT SERPL W P-5'-P-CCNC: 12 U/L (ref 7–45)
ANION GAP SERPL CALC-SCNC: 13 MMOL/L (ref 10–20)
ANTIBODY SCREEN: NORMAL
AST SERPL W P-5'-P-CCNC: 13 U/L (ref 9–39)
BILIRUB SERPL-MCNC: 0.3 MG/DL (ref 0–1.2)
BUN SERPL-MCNC: 15 MG/DL (ref 6–23)
CALCIUM SERPL-MCNC: 9.3 MG/DL (ref 8.6–10.6)
CHLORIDE SERPL-SCNC: 102 MMOL/L (ref 98–107)
CO2 SERPL-SCNC: 25 MMOL/L (ref 21–32)
CREAT SERPL-MCNC: 0.74 MG/DL (ref 0.5–1.05)
EGFRCR SERPLBLD CKD-EPI 2021: >90 ML/MIN/1.73M*2
ERYTHROCYTE [DISTWIDTH] IN BLOOD BY AUTOMATED COUNT: 15.7 % (ref 11.5–14.5)
GLUCOSE SERPL-MCNC: 76 MG/DL (ref 74–99)
HCT VFR BLD AUTO: 39.1 % (ref 36–46)
HGB BLD-MCNC: 12.3 G/DL (ref 12–16)
INR PPP: 1 (ref 0.9–1.1)
MCH RBC QN AUTO: 25.3 PG (ref 26–34)
MCHC RBC AUTO-ENTMCNC: 31.5 G/DL (ref 32–36)
MCV RBC AUTO: 81 FL (ref 80–100)
NRBC BLD-RTO: 0 /100 WBCS (ref 0–0)
PLATELET # BLD AUTO: 301 X10*3/UL (ref 150–450)
POTASSIUM SERPL-SCNC: 4.2 MMOL/L (ref 3.5–5.3)
PROT SERPL-MCNC: 7.1 G/DL (ref 6.4–8.2)
PROTHROMBIN TIME: 11 SECONDS (ref 9.8–12.8)
RBC # BLD AUTO: 4.86 X10*6/UL (ref 4–5.2)
RH FACTOR (ANTIGEN D): NORMAL
SODIUM SERPL-SCNC: 136 MMOL/L (ref 136–145)
WBC # BLD AUTO: 5.4 X10*3/UL (ref 4.4–11.3)

## 2024-04-20 PROCEDURE — 85610 PROTHROMBIN TIME: CPT

## 2024-04-20 PROCEDURE — 36415 COLL VENOUS BLD VENIPUNCTURE: CPT

## 2024-04-20 PROCEDURE — 80323 ALKALOIDS NOS: CPT

## 2024-04-20 PROCEDURE — 86850 RBC ANTIBODY SCREEN: CPT

## 2024-04-20 PROCEDURE — 80053 COMPREHEN METABOLIC PANEL: CPT

## 2024-04-20 PROCEDURE — 86870 RBC ANTIBODY IDENTIFICATION: CPT

## 2024-04-20 PROCEDURE — 86901 BLOOD TYPING SEROLOGIC RH(D): CPT

## 2024-04-20 PROCEDURE — 85027 COMPLETE CBC AUTOMATED: CPT

## 2024-04-20 PROCEDURE — 86900 BLOOD TYPING SEROLOGIC ABO: CPT

## 2024-04-21 LAB
BB ANTIBODY IDENTIFICATION: NORMAL
CASE #: NORMAL

## 2024-04-22 ENCOUNTER — ANESTHESIA (OUTPATIENT)
Dept: OPERATING ROOM | Facility: HOSPITAL | Age: 44
End: 2024-04-22
Payer: COMMERCIAL

## 2024-04-22 ENCOUNTER — HOSPITAL ENCOUNTER (INPATIENT)
Facility: HOSPITAL | Age: 44
LOS: 1 days | Discharge: HOME | End: 2024-04-23
Attending: SURGERY | Admitting: SURGERY
Payer: COMMERCIAL

## 2024-04-22 DIAGNOSIS — Z98.84 S/P GASTRIC BYPASS: ICD-10-CM

## 2024-04-22 DIAGNOSIS — E66.01 MORBID OBESITY DUE TO EXCESS CALORIES (MULTI): Primary | ICD-10-CM

## 2024-04-22 DIAGNOSIS — E66.01 MORBID OBESITY (MULTI): ICD-10-CM

## 2024-04-22 LAB — HOLD SPECIMEN: NORMAL

## 2024-04-22 PROCEDURE — 43644 LAP GASTRIC BYPASS/ROUX-EN-Y: CPT | Performed by: SURGERY

## 2024-04-22 PROCEDURE — 2500000005 HC RX 250 GENERAL PHARMACY W/O HCPCS: Performed by: SURGERY

## 2024-04-22 PROCEDURE — 0D164ZA BYPASS STOMACH TO JEJUNUM, PERCUTANEOUS ENDOSCOPIC APPROACH: ICD-10-PCS | Performed by: SURGERY

## 2024-04-22 PROCEDURE — 3700000002 HC GENERAL ANESTHESIA TIME - EACH INCREMENTAL 1 MINUTE: Performed by: SURGERY

## 2024-04-22 PROCEDURE — 7100000001 HC RECOVERY ROOM TIME - INITIAL BASE CHARGE: Performed by: SURGERY

## 2024-04-22 PROCEDURE — 2500000002 HC RX 250 W HCPCS SELF ADMINISTERED DRUGS (ALT 637 FOR MEDICARE OP, ALT 636 FOR OP/ED): Performed by: SURGERY

## 2024-04-22 PROCEDURE — 2500000004 HC RX 250 GENERAL PHARMACY W/ HCPCS (ALT 636 FOR OP/ED): Performed by: ANESTHESIOLOGY

## 2024-04-22 PROCEDURE — 3700000001 HC GENERAL ANESTHESIA TIME - INITIAL BASE CHARGE: Performed by: SURGERY

## 2024-04-22 PROCEDURE — 3600000004 HC OR TIME - INITIAL BASE CHARGE - PROCEDURE LEVEL FOUR: Performed by: SURGERY

## 2024-04-22 PROCEDURE — 2500000005 HC RX 250 GENERAL PHARMACY W/O HCPCS: Performed by: ANESTHESIOLOGY

## 2024-04-22 PROCEDURE — 2780000003 HC OR 278 NO HCPCS: Performed by: SURGERY

## 2024-04-22 PROCEDURE — A43644 PR LAP GASTRIC BYPASS/ROUX-EN-Y: Performed by: NURSE ANESTHETIST, CERTIFIED REGISTERED

## 2024-04-22 PROCEDURE — 36415 COLL VENOUS BLD VENIPUNCTURE: CPT | Performed by: SURGERY

## 2024-04-22 PROCEDURE — 2720000007 HC OR 272 NO HCPCS: Performed by: SURGERY

## 2024-04-22 PROCEDURE — 1200000002 HC GENERAL ROOM WITH TELEMETRY DAILY

## 2024-04-22 PROCEDURE — 7100000002 HC RECOVERY ROOM TIME - EACH INCREMENTAL 1 MINUTE: Performed by: SURGERY

## 2024-04-22 PROCEDURE — 2500000005 HC RX 250 GENERAL PHARMACY W/O HCPCS: Performed by: NURSE ANESTHETIST, CERTIFIED REGISTERED

## 2024-04-22 PROCEDURE — 2500000004 HC RX 250 GENERAL PHARMACY W/ HCPCS (ALT 636 FOR OP/ED): Performed by: SURGERY

## 2024-04-22 PROCEDURE — 2500000001 HC RX 250 WO HCPCS SELF ADMINISTERED DRUGS (ALT 637 FOR MEDICARE OP): Performed by: SURGERY

## 2024-04-22 PROCEDURE — 2500000004 HC RX 250 GENERAL PHARMACY W/ HCPCS (ALT 636 FOR OP/ED): Performed by: NURSE ANESTHETIST, CERTIFIED REGISTERED

## 2024-04-22 PROCEDURE — 96372 THER/PROPH/DIAG INJ SC/IM: CPT | Performed by: SURGERY

## 2024-04-22 PROCEDURE — 3600000009 HC OR TIME - EACH INCREMENTAL 1 MINUTE - PROCEDURE LEVEL FOUR: Performed by: SURGERY

## 2024-04-22 PROCEDURE — 9420000001 HC RT PATIENT EDUCATION 5 MIN

## 2024-04-22 PROCEDURE — A43644 PR LAP GASTRIC BYPASS/ROUX-EN-Y: Performed by: ANESTHESIOLOGY

## 2024-04-22 RX ORDER — LIDOCAINE HCL/PF 100 MG/5ML
SYRINGE (ML) INTRAVENOUS AS NEEDED
Status: DISCONTINUED | OUTPATIENT
Start: 2024-04-22 | End: 2024-04-22

## 2024-04-22 RX ORDER — PROPOFOL 10 MG/ML
INJECTION, EMULSION INTRAVENOUS AS NEEDED
Status: DISCONTINUED | OUTPATIENT
Start: 2024-04-22 | End: 2024-04-22

## 2024-04-22 RX ORDER — HYDRALAZINE HYDROCHLORIDE 20 MG/ML
10 INJECTION INTRAMUSCULAR; INTRAVENOUS EVERY 4 HOURS PRN
Status: DISCONTINUED | OUTPATIENT
Start: 2024-04-22 | End: 2024-04-23 | Stop reason: HOSPADM

## 2024-04-22 RX ORDER — CEFAZOLIN 1 G/1
INJECTION, POWDER, FOR SOLUTION INTRAVENOUS AS NEEDED
Status: DISCONTINUED | OUTPATIENT
Start: 2024-04-22 | End: 2024-04-22

## 2024-04-22 RX ORDER — ONDANSETRON HYDROCHLORIDE 2 MG/ML
4 INJECTION, SOLUTION INTRAVENOUS ONCE AS NEEDED
Status: COMPLETED | OUTPATIENT
Start: 2024-04-22 | End: 2024-04-22

## 2024-04-22 RX ORDER — FLUTICASONE PROPIONATE 50 MCG
1 SPRAY, SUSPENSION (ML) NASAL DAILY PRN
Status: DISCONTINUED | OUTPATIENT
Start: 2024-04-22 | End: 2024-04-23 | Stop reason: HOSPADM

## 2024-04-22 RX ORDER — ESOMEPRAZOLE MAGNESIUM 40 MG/1
40 GRANULE, DELAYED RELEASE ORAL
Status: DISCONTINUED | OUTPATIENT
Start: 2024-04-23 | End: 2024-04-23 | Stop reason: HOSPADM

## 2024-04-22 RX ORDER — FENTANYL CITRATE 50 UG/ML
INJECTION, SOLUTION INTRAMUSCULAR; INTRAVENOUS AS NEEDED
Status: DISCONTINUED | OUTPATIENT
Start: 2024-04-22 | End: 2024-04-22

## 2024-04-22 RX ORDER — LABETALOL HYDROCHLORIDE 5 MG/ML
INJECTION, SOLUTION INTRAVENOUS AS NEEDED
Status: DISCONTINUED | OUTPATIENT
Start: 2024-04-22 | End: 2024-04-22

## 2024-04-22 RX ORDER — HYDROMORPHONE HYDROCHLORIDE 2 MG/ML
INJECTION, SOLUTION INTRAMUSCULAR; INTRAVENOUS; SUBCUTANEOUS AS NEEDED
Status: DISCONTINUED | OUTPATIENT
Start: 2024-04-22 | End: 2024-04-22

## 2024-04-22 RX ORDER — SIMETHICONE 80 MG
80 TABLET,CHEWABLE ORAL EVERY 4 HOURS PRN
Status: DISCONTINUED | OUTPATIENT
Start: 2024-04-22 | End: 2024-04-23 | Stop reason: HOSPADM

## 2024-04-22 RX ORDER — KETOROLAC TROMETHAMINE 15 MG/ML
15 INJECTION, SOLUTION INTRAMUSCULAR; INTRAVENOUS EVERY 6 HOURS SCHEDULED
Status: DISCONTINUED | OUTPATIENT
Start: 2024-04-22 | End: 2024-04-23

## 2024-04-22 RX ORDER — PANTOPRAZOLE SODIUM 40 MG/10ML
40 INJECTION, POWDER, LYOPHILIZED, FOR SOLUTION INTRAVENOUS
Status: DISCONTINUED | OUTPATIENT
Start: 2024-04-23 | End: 2024-04-23 | Stop reason: HOSPADM

## 2024-04-22 RX ORDER — ENOXAPARIN SODIUM 100 MG/ML
40 INJECTION SUBCUTANEOUS EVERY 12 HOURS
Status: DISCONTINUED | OUTPATIENT
Start: 2024-04-22 | End: 2024-04-23

## 2024-04-22 RX ORDER — OXYCODONE HYDROCHLORIDE 5 MG/1
5 TABLET ORAL EVERY 4 HOURS PRN
Status: DISCONTINUED | OUTPATIENT
Start: 2024-04-22 | End: 2024-04-23 | Stop reason: HOSPADM

## 2024-04-22 RX ORDER — PROCHLORPERAZINE MALEATE 5 MG
10 TABLET ORAL EVERY 6 HOURS PRN
Status: DISCONTINUED | OUTPATIENT
Start: 2024-04-22 | End: 2024-04-23 | Stop reason: HOSPADM

## 2024-04-22 RX ORDER — SODIUM CHLORIDE, SODIUM LACTATE, POTASSIUM CHLORIDE, CALCIUM CHLORIDE 600; 310; 30; 20 MG/100ML; MG/100ML; MG/100ML; MG/100ML
100 INJECTION, SOLUTION INTRAVENOUS CONTINUOUS
Status: DISCONTINUED | OUTPATIENT
Start: 2024-04-22 | End: 2024-04-22

## 2024-04-22 RX ORDER — MIDAZOLAM HYDROCHLORIDE 1 MG/ML
INJECTION INTRAMUSCULAR; INTRAVENOUS AS NEEDED
Status: DISCONTINUED | OUTPATIENT
Start: 2024-04-22 | End: 2024-04-22

## 2024-04-22 RX ORDER — ACETAMINOPHEN 325 MG/1
650 TABLET ORAL EVERY 6 HOURS
Status: DISCONTINUED | OUTPATIENT
Start: 2024-04-22 | End: 2024-04-23 | Stop reason: HOSPADM

## 2024-04-22 RX ORDER — SCOLOPAMINE TRANSDERMAL SYSTEM 1 MG/1
1 PATCH, EXTENDED RELEASE TRANSDERMAL ONCE
Status: DISCONTINUED | OUTPATIENT
Start: 2024-04-22 | End: 2024-04-23 | Stop reason: HOSPADM

## 2024-04-22 RX ORDER — PROCHLORPERAZINE 25 MG/1
25 SUPPOSITORY RECTAL EVERY 12 HOURS PRN
Status: DISCONTINUED | OUTPATIENT
Start: 2024-04-22 | End: 2024-04-23 | Stop reason: HOSPADM

## 2024-04-22 RX ORDER — TIZANIDINE 4 MG/1
4 TABLET ORAL NIGHTLY
Status: DISCONTINUED | OUTPATIENT
Start: 2024-04-22 | End: 2024-04-23 | Stop reason: HOSPADM

## 2024-04-22 RX ORDER — OXYCODONE HYDROCHLORIDE 10 MG/1
10 TABLET ORAL EVERY 4 HOURS PRN
Status: DISCONTINUED | OUTPATIENT
Start: 2024-04-22 | End: 2024-04-23 | Stop reason: HOSPADM

## 2024-04-22 RX ORDER — APREPITANT 40 MG/1
40 CAPSULE ORAL ONCE
Status: COMPLETED | OUTPATIENT
Start: 2024-04-22 | End: 2024-04-22

## 2024-04-22 RX ORDER — PANTOPRAZOLE SODIUM 40 MG/1
40 TABLET, DELAYED RELEASE ORAL
Status: DISCONTINUED | OUTPATIENT
Start: 2024-04-23 | End: 2024-04-23 | Stop reason: HOSPADM

## 2024-04-22 RX ORDER — ROCURONIUM BROMIDE 10 MG/ML
INJECTION, SOLUTION INTRAVENOUS AS NEEDED
Status: DISCONTINUED | OUTPATIENT
Start: 2024-04-22 | End: 2024-04-22

## 2024-04-22 RX ORDER — ACETAMINOPHEN 325 MG/1
650 TABLET ORAL ONCE
Status: COMPLETED | OUTPATIENT
Start: 2024-04-22 | End: 2024-04-22

## 2024-04-22 RX ORDER — ONDANSETRON HYDROCHLORIDE 2 MG/ML
INJECTION, SOLUTION INTRAVENOUS AS NEEDED
Status: DISCONTINUED | OUTPATIENT
Start: 2024-04-22 | End: 2024-04-22

## 2024-04-22 RX ORDER — SODIUM CHLORIDE, SODIUM LACTATE, POTASSIUM CHLORIDE, CALCIUM CHLORIDE 600; 310; 30; 20 MG/100ML; MG/100ML; MG/100ML; MG/100ML
100 INJECTION, SOLUTION INTRAVENOUS CONTINUOUS
Status: DISCONTINUED | OUTPATIENT
Start: 2024-04-22 | End: 2024-04-22 | Stop reason: HOSPADM

## 2024-04-22 RX ORDER — ONDANSETRON 4 MG/1
4 TABLET, ORALLY DISINTEGRATING ORAL EVERY 6 HOURS
Status: DISCONTINUED | OUTPATIENT
Start: 2024-04-22 | End: 2024-04-23 | Stop reason: HOSPADM

## 2024-04-22 RX ORDER — CELECOXIB 400 MG/1
400 CAPSULE ORAL ONCE
Status: COMPLETED | OUTPATIENT
Start: 2024-04-22 | End: 2024-04-22

## 2024-04-22 RX ORDER — SODIUM CHLORIDE AND POTASSIUM CHLORIDE 150; 900 MG/100ML; MG/100ML
75 INJECTION, SOLUTION INTRAVENOUS CONTINUOUS
Status: DISCONTINUED | OUTPATIENT
Start: 2024-04-22 | End: 2024-04-23 | Stop reason: HOSPADM

## 2024-04-22 RX ORDER — ONDANSETRON HYDROCHLORIDE 2 MG/ML
4 INJECTION, SOLUTION INTRAVENOUS EVERY 6 HOURS
Status: DISCONTINUED | OUTPATIENT
Start: 2024-04-22 | End: 2024-04-23 | Stop reason: HOSPADM

## 2024-04-22 RX ORDER — NALOXONE HYDROCHLORIDE 0.4 MG/ML
0.2 INJECTION, SOLUTION INTRAMUSCULAR; INTRAVENOUS; SUBCUTANEOUS EVERY 5 MIN PRN
Status: DISCONTINUED | OUTPATIENT
Start: 2024-04-22 | End: 2024-04-23 | Stop reason: HOSPADM

## 2024-04-22 RX ORDER — KETOROLAC TROMETHAMINE 30 MG/ML
INJECTION, SOLUTION INTRAMUSCULAR; INTRAVENOUS AS NEEDED
Status: DISCONTINUED | OUTPATIENT
Start: 2024-04-22 | End: 2024-04-22

## 2024-04-22 RX ORDER — HYDRALAZINE HYDROCHLORIDE 20 MG/ML
INJECTION INTRAMUSCULAR; INTRAVENOUS AS NEEDED
Status: DISCONTINUED | OUTPATIENT
Start: 2024-04-22 | End: 2024-04-22

## 2024-04-22 RX ORDER — HEPARIN SODIUM 5000 [USP'U]/ML
5000 INJECTION, SOLUTION INTRAVENOUS; SUBCUTANEOUS ONCE
Status: COMPLETED | OUTPATIENT
Start: 2024-04-22 | End: 2024-04-22

## 2024-04-22 RX ORDER — OXYCODONE HYDROCHLORIDE 5 MG/1
5 TABLET ORAL EVERY 4 HOURS PRN
Status: DISCONTINUED | OUTPATIENT
Start: 2024-04-22 | End: 2024-04-22 | Stop reason: HOSPADM

## 2024-04-22 RX ORDER — GABAPENTIN 300 MG/1
600 CAPSULE ORAL ONCE
Status: COMPLETED | OUTPATIENT
Start: 2024-04-22 | End: 2024-04-22

## 2024-04-22 RX ORDER — HYDRALAZINE HYDROCHLORIDE 20 MG/ML
10 INJECTION INTRAMUSCULAR; INTRAVENOUS ONCE
Status: COMPLETED | OUTPATIENT
Start: 2024-04-22 | End: 2024-04-22

## 2024-04-22 RX ORDER — PROCHLORPERAZINE EDISYLATE 5 MG/ML
10 INJECTION INTRAMUSCULAR; INTRAVENOUS EVERY 6 HOURS PRN
Status: DISCONTINUED | OUTPATIENT
Start: 2024-04-22 | End: 2024-04-23 | Stop reason: HOSPADM

## 2024-04-22 RX ORDER — DROPERIDOL 2.5 MG/ML
0.62 INJECTION, SOLUTION INTRAMUSCULAR; INTRAVENOUS ONCE AS NEEDED
Status: DISCONTINUED | OUTPATIENT
Start: 2024-04-22 | End: 2024-04-22 | Stop reason: HOSPADM

## 2024-04-22 RX ADMIN — SUGAMMADEX 200 MG: 100 INJECTION, SOLUTION INTRAVENOUS at 12:22

## 2024-04-22 RX ADMIN — CELECOXIB 400 MG: 400 CAPSULE ORAL at 08:19

## 2024-04-22 RX ADMIN — ROCURONIUM BROMIDE 20 MG: 10 INJECTION, SOLUTION INTRAVENOUS at 11:10

## 2024-04-22 RX ADMIN — ROCURONIUM BROMIDE 50 MG: 10 INJECTION, SOLUTION INTRAVENOUS at 10:15

## 2024-04-22 RX ADMIN — OXYCODONE HYDROCHLORIDE 10 MG: 10 TABLET ORAL at 20:17

## 2024-04-22 RX ADMIN — FENTANYL CITRATE 50 MCG: 50 INJECTION, SOLUTION INTRAMUSCULAR; INTRAVENOUS at 10:12

## 2024-04-22 RX ADMIN — ROCURONIUM BROMIDE 20 MG: 10 INJECTION, SOLUTION INTRAVENOUS at 10:44

## 2024-04-22 RX ADMIN — MIDAZOLAM HYDROCHLORIDE 2 MG: 1 INJECTION, SOLUTION INTRAMUSCULAR; INTRAVENOUS at 10:05

## 2024-04-22 RX ADMIN — HYDRALAZINE HYDROCHLORIDE 4 MG: 20 INJECTION INTRAMUSCULAR; INTRAVENOUS at 12:25

## 2024-04-22 RX ADMIN — ONDANSETRON 4 MG: 2 INJECTION INTRAMUSCULAR; INTRAVENOUS at 13:14

## 2024-04-22 RX ADMIN — HYDRALAZINE HYDROCHLORIDE 10 MG: 20 INJECTION INTRAMUSCULAR; INTRAVENOUS at 13:07

## 2024-04-22 RX ADMIN — SODIUM CHLORIDE, POTASSIUM CHLORIDE, SODIUM LACTATE AND CALCIUM CHLORIDE: 600; 310; 30; 20 INJECTION, SOLUTION INTRAVENOUS at 12:17

## 2024-04-22 RX ADMIN — PROPOFOL 30 MG: 10 INJECTION, EMULSION INTRAVENOUS at 11:08

## 2024-04-22 RX ADMIN — ENOXAPARIN SODIUM 40 MG: 40 INJECTION SUBCUTANEOUS at 20:16

## 2024-04-22 RX ADMIN — APREPITANT 40 MG: 40 CAPSULE ORAL at 08:19

## 2024-04-22 RX ADMIN — SODIUM CHLORIDE, POTASSIUM CHLORIDE, SODIUM LACTATE AND CALCIUM CHLORIDE: 600; 310; 30; 20 INJECTION, SOLUTION INTRAVENOUS at 10:28

## 2024-04-22 RX ADMIN — ACETAMINOPHEN 650 MG: 325 TABLET ORAL at 20:16

## 2024-04-22 RX ADMIN — LIDOCAINE HYDROCHLORIDE 100 MG: 20 INJECTION INTRAVENOUS at 10:12

## 2024-04-22 RX ADMIN — FENTANYL CITRATE 25 MCG: 50 INJECTION, SOLUTION INTRAMUSCULAR; INTRAVENOUS at 10:05

## 2024-04-22 RX ADMIN — ONDANSETRON 4 MG: 2 INJECTION, SOLUTION INTRAMUSCULAR; INTRAVENOUS at 10:13

## 2024-04-22 RX ADMIN — SODIUM CHLORIDE, POTASSIUM CHLORIDE, SODIUM LACTATE AND CALCIUM CHLORIDE 100 ML/HR: 600; 310; 30; 20 INJECTION, SOLUTION INTRAVENOUS at 08:21

## 2024-04-22 RX ADMIN — HYDRALAZINE HYDROCHLORIDE 4 MG: 20 INJECTION INTRAMUSCULAR; INTRAVENOUS at 12:36

## 2024-04-22 RX ADMIN — SCOPOLAMINE 1 PATCH: 1.5 PATCH, EXTENDED RELEASE TRANSDERMAL at 08:20

## 2024-04-22 RX ADMIN — ONDANSETRON 4 MG: 2 INJECTION, SOLUTION INTRAMUSCULAR; INTRAVENOUS at 12:04

## 2024-04-22 RX ADMIN — HYDROMORPHONE HYDROCHLORIDE 0.5 MG: 1 INJECTION, SOLUTION INTRAMUSCULAR; INTRAVENOUS; SUBCUTANEOUS at 12:57

## 2024-04-22 RX ADMIN — FENTANYL CITRATE 25 MCG: 50 INJECTION, SOLUTION INTRAMUSCULAR; INTRAVENOUS at 10:13

## 2024-04-22 RX ADMIN — CEFAZOLIN 3 G: 330 INJECTION, POWDER, FOR SOLUTION INTRAMUSCULAR; INTRAVENOUS at 10:11

## 2024-04-22 RX ADMIN — FENTANYL CITRATE 50 MCG: 50 INJECTION, SOLUTION INTRAMUSCULAR; INTRAVENOUS at 12:01

## 2024-04-22 RX ADMIN — HYDROMORPHONE HYDROCHLORIDE 0.5 MG: 1 INJECTION, SOLUTION INTRAMUSCULAR; INTRAVENOUS; SUBCUTANEOUS at 13:37

## 2024-04-22 RX ADMIN — GABAPENTIN 600 MG: 300 CAPSULE ORAL at 08:18

## 2024-04-22 RX ADMIN — PROCHLORPERAZINE EDISYLATE 10 MG: 5 INJECTION INTRAMUSCULAR; INTRAVENOUS at 16:17

## 2024-04-22 RX ADMIN — HYDROMORPHONE HYDROCHLORIDE 0.4 MG: 2 INJECTION, SOLUTION INTRAMUSCULAR; INTRAVENOUS; SUBCUTANEOUS at 10:41

## 2024-04-22 RX ADMIN — KETOROLAC TROMETHAMINE 30 MG: 30 INJECTION, SOLUTION INTRAMUSCULAR; INTRAVENOUS at 12:11

## 2024-04-22 RX ADMIN — HYDROMORPHONE HYDROCHLORIDE 0.4 MG: 2 INJECTION, SOLUTION INTRAMUSCULAR; INTRAVENOUS; SUBCUTANEOUS at 10:12

## 2024-04-22 RX ADMIN — DEXAMETHASONE SODIUM PHOSPHATE 8 MG: 4 INJECTION INTRA-ARTICULAR; INTRALESIONAL; INTRAMUSCULAR; INTRAVENOUS; SOFT TISSUE at 10:15

## 2024-04-22 RX ADMIN — ROCURONIUM BROMIDE 10 MG: 10 INJECTION, SOLUTION INTRAVENOUS at 11:49

## 2024-04-22 RX ADMIN — HYDROMORPHONE HYDROCHLORIDE 0.4 MG: 2 INJECTION, SOLUTION INTRAMUSCULAR; INTRAVENOUS; SUBCUTANEOUS at 12:36

## 2024-04-22 RX ADMIN — LABETALOL HYDROCHLORIDE 2.5 MG: 5 INJECTION, SOLUTION INTRAVENOUS at 12:12

## 2024-04-22 RX ADMIN — PROPOFOL 200 MG: 10 INJECTION, EMULSION INTRAVENOUS at 10:13

## 2024-04-22 RX ADMIN — HYDROMORPHONE HYDROCHLORIDE 0.5 MG: 1 INJECTION, SOLUTION INTRAMUSCULAR; INTRAVENOUS; SUBCUTANEOUS at 12:48

## 2024-04-22 RX ADMIN — HYDRALAZINE HYDROCHLORIDE 10 MG: 20 INJECTION INTRAMUSCULAR; INTRAVENOUS at 18:37

## 2024-04-22 RX ADMIN — ONDANSETRON 4 MG: 2 INJECTION INTRAMUSCULAR; INTRAVENOUS at 20:16

## 2024-04-22 RX ADMIN — HEPARIN SODIUM 5000 UNITS: 5000 INJECTION INTRAVENOUS; SUBCUTANEOUS at 08:17

## 2024-04-22 RX ADMIN — Medication 6 L/MIN: at 12:32

## 2024-04-22 RX ADMIN — HYDROMORPHONE HYDROCHLORIDE 0.4 MG: 2 INJECTION, SOLUTION INTRAMUSCULAR; INTRAVENOUS; SUBCUTANEOUS at 11:08

## 2024-04-22 RX ADMIN — KETOROLAC TROMETHAMINE 15 MG: 15 INJECTION, SOLUTION INTRAMUSCULAR; INTRAVENOUS at 18:04

## 2024-04-22 RX ADMIN — POTASSIUM CHLORIDE AND SODIUM CHLORIDE 75 ML/HR: 900; 150 INJECTION, SOLUTION INTRAVENOUS at 20:17

## 2024-04-22 RX ADMIN — HYDROMORPHONE HYDROCHLORIDE 0.5 MG: 1 INJECTION, SOLUTION INTRAMUSCULAR; INTRAVENOUS; SUBCUTANEOUS at 13:13

## 2024-04-22 RX ADMIN — ACETAMINOPHEN 650 MG: 325 TABLET ORAL at 08:18

## 2024-04-22 SDOH — SOCIAL STABILITY: SOCIAL INSECURITY: DO YOU FEEL ANYONE HAS EXPLOITED OR TAKEN ADVANTAGE OF YOU FINANCIALLY OR OF YOUR PERSONAL PROPERTY?: NO

## 2024-04-22 SDOH — SOCIAL STABILITY: SOCIAL INSECURITY: ARE THERE ANY APPARENT SIGNS OF INJURIES/BEHAVIORS THAT COULD BE RELATED TO ABUSE/NEGLECT?: NO

## 2024-04-22 SDOH — SOCIAL STABILITY: SOCIAL INSECURITY: ARE YOU OR HAVE YOU BEEN THREATENED OR ABUSED PHYSICALLY, EMOTIONALLY, OR SEXUALLY BY ANYONE?: NO

## 2024-04-22 SDOH — HEALTH STABILITY: MENTAL HEALTH: CURRENT SMOKER: 0

## 2024-04-22 SDOH — SOCIAL STABILITY: SOCIAL INSECURITY: HAVE YOU HAD THOUGHTS OF HARMING ANYONE ELSE?: NO

## 2024-04-22 SDOH — SOCIAL STABILITY: SOCIAL INSECURITY: DOES ANYONE TRY TO KEEP YOU FROM HAVING/CONTACTING OTHER FRIENDS OR DOING THINGS OUTSIDE YOUR HOME?: NO

## 2024-04-22 SDOH — SOCIAL STABILITY: SOCIAL INSECURITY: ABUSE: ADULT

## 2024-04-22 SDOH — SOCIAL STABILITY: SOCIAL INSECURITY: DO YOU FEEL UNSAFE GOING BACK TO THE PLACE WHERE YOU ARE LIVING?: NO

## 2024-04-22 SDOH — SOCIAL STABILITY: SOCIAL INSECURITY: HAS ANYONE EVER THREATENED TO HURT YOUR FAMILY OR YOUR PETS?: NO

## 2024-04-22 SDOH — SOCIAL STABILITY: SOCIAL INSECURITY: WERE YOU ABLE TO COMPLETE ALL THE BEHAVIORAL HEALTH SCREENINGS?: YES

## 2024-04-22 ASSESSMENT — PAIN SCALES - GENERAL
PAINLEVEL_OUTOF10: 8
PAINLEVEL_OUTOF10: 8
PAINLEVEL_OUTOF10: 5 - MODERATE PAIN
PAINLEVEL_OUTOF10: 7
PAINLEVEL_OUTOF10: 0 - NO PAIN
PAINLEVEL_OUTOF10: 8
PAINLEVEL_OUTOF10: 7
PAINLEVEL_OUTOF10: 7
PAINLEVEL_OUTOF10: 5 - MODERATE PAIN

## 2024-04-22 ASSESSMENT — COGNITIVE AND FUNCTIONAL STATUS - GENERAL
CLIMB 3 TO 5 STEPS WITH RAILING: A LITTLE
TOILETING: A LITTLE
WALKING IN HOSPITAL ROOM: A LITTLE
DRESSING REGULAR LOWER BODY CLOTHING: A LITTLE
MOBILITY SCORE: 21
MOBILITY SCORE: 24
DAILY ACTIVITIY SCORE: 24
DAILY ACTIVITIY SCORE: 22
STANDING UP FROM CHAIR USING ARMS: A LITTLE
PATIENT BASELINE BEDBOUND: NO

## 2024-04-22 ASSESSMENT — ACTIVITIES OF DAILY LIVING (ADL)
JUDGMENT_ADEQUATE_SAFELY_COMPLETE_DAILY_ACTIVITIES: YES
TOILETING: INDEPENDENT
GROOMING: INDEPENDENT
HEARING - RIGHT EAR: FUNCTIONAL
PATIENT'S MEMORY ADEQUATE TO SAFELY COMPLETE DAILY ACTIVITIES?: YES
LACK_OF_TRANSPORTATION: NO
WALKS IN HOME: INDEPENDENT
HEARING - LEFT EAR: FUNCTIONAL
FEEDING YOURSELF: INDEPENDENT
DRESSING YOURSELF: INDEPENDENT
ADEQUATE_TO_COMPLETE_ADL: YES
BATHING: INDEPENDENT

## 2024-04-22 ASSESSMENT — LIFESTYLE VARIABLES
HOW OFTEN DO YOU HAVE 6 OR MORE DRINKS ON ONE OCCASION: NEVER
HOW OFTEN DO YOU HAVE A DRINK CONTAINING ALCOHOL: NEVER
HOW MANY STANDARD DRINKS CONTAINING ALCOHOL DO YOU HAVE ON A TYPICAL DAY: PATIENT DOES NOT DRINK
AUDIT-C TOTAL SCORE: 0
AUDIT-C TOTAL SCORE: 0
SKIP TO QUESTIONS 9-10: 1
SUBSTANCE_ABUSE_PAST_12_MONTHS: NO
PRESCIPTION_ABUSE_PAST_12_MONTHS: NO

## 2024-04-22 ASSESSMENT — COLUMBIA-SUICIDE SEVERITY RATING SCALE - C-SSRS
1. IN THE PAST MONTH, HAVE YOU WISHED YOU WERE DEAD OR WISHED YOU COULD GO TO SLEEP AND NOT WAKE UP?: NO
6. HAVE YOU EVER DONE ANYTHING, STARTED TO DO ANYTHING, OR PREPARED TO DO ANYTHING TO END YOUR LIFE?: NO
2. HAVE YOU ACTUALLY HAD ANY THOUGHTS OF KILLING YOURSELF?: NO

## 2024-04-22 ASSESSMENT — PATIENT HEALTH QUESTIONNAIRE - PHQ9
1. LITTLE INTEREST OR PLEASURE IN DOING THINGS: NOT AT ALL
SUM OF ALL RESPONSES TO PHQ9 QUESTIONS 1 & 2: 0
2. FEELING DOWN, DEPRESSED OR HOPELESS: NOT AT ALL

## 2024-04-22 ASSESSMENT — PAIN - FUNCTIONAL ASSESSMENT: PAIN_FUNCTIONAL_ASSESSMENT: 0-10

## 2024-04-22 NOTE — ANESTHESIA PREPROCEDURE EVALUATION
"Patient: Shoneia M Collins \"Tom\"    Procedure Information       Date/Time: 24    Procedure: LAPAROSCOPY, WITH GASTRIC BYPASS    [facility]    Surgeons: Luis Armando Benito MD          Vitals:    24 0826   BP: (!) 151/91   Pulse: 63   Resp: 18   Temp: 36.4 °C (97.5 °F)   SpO2: 100%       Past Surgical History:   Procedure Laterality Date     SECTION, CLASSIC  11/10/2014     Section    GALLBLADDER SURGERY  11/10/2014    Gallbladder Surgery    HYSTERECTOMY      OTHER SURGICAL HISTORY  2021    Laparoscopic hysterectomy    OTHER SURGICAL HISTORY  2021    Bilateral salpingectomy    OTHER SURGICAL HISTORY  2021    Cystoscopy    TONSILLECTOMY      TUBAL LIGATION  2017    Tubal Ligation     Past Medical History:   Diagnosis Date    Anemia     Bariatric surgery status     Benign hypertension 10/30/2018    Class 3 severe obesity due to excess calories without serious comorbidity with body mass index (BMI) of 50.0 to 59.9 in adult (Multi) 2023    Encounter for pre-operative cardiovascular clearance 2023    Liam Doss MD For B[facility]    GERD (gastroesophageal reflux disease) 2023    Hidradenitis suppurativa     Hypertrophy of tonsils with hypertrophy of adenoids 2014    Tonsillar and adenoid hypertrophy    Personal history of gestational diabetes 2017    History of gestational diabetes mellitus (GDM)    Snoring 11/10/2014    Snoring    Thrombophlebitis 2023    Varicose veins with pain 2023    Vitamin B12 deficiency 2023    Vitamin D deficiency 2023       Current Facility-Administered Medications:     ceFAZolin (Ancef) 3 g in dextrose 5% 100 mL IV, 3 g, intravenous, Once, Luis Armando Benito MD    lactated Ringer's infusion, 100 mL/hr, intravenous, Continuous, Colt Mendoza MD    lactated Ringer's infusion, 100 mL/hr, intravenous, Continuous, Luis Armando Benito MD, Last Rate: 100 " mL/hr at 04/22/24 0821, 100 mL/hr at 04/22/24 0821    scopolamine (Transderm-Scop) patch 1 patch, 1 patch, transdermal, Once, Luis Armando Benito MD, 1 patch at 04/22/24 0820  Prior to Admission medications    Medication Sig Start Date End Date Taking? Authorizing Provider   calcium citrate (Calcitrate) 200 mg (950 mg) tablet Take 1 tablet (200 mg) by mouth once daily. 4/9/24 4/9/25 Yes CA Grace   clindamycin (Cleocin T) 1 % lotion Apply topically if needed.   Yes Historical Provider, MD   cyanocobalamin (Vitamin B-12) 500 mcg tablet Take 1 tablet (500 mcg) by mouth once daily. 4/9/24  Yes CA Grace   famotidine (Pepcid) 20 mg tablet Take 1 tablet (20 mg) by mouth once daily at bedtime. 12/26/23  Yes Luis Armando Benito MD   ferrous sulfate, 325 mg ferrous sulfate, tablet Take 1 tablet by mouth once daily. 4/9/24 4/9/25 Yes CA Grace   fluticasone (Flonase) 50 mcg/actuation nasal spray Administer into affected nostril(s) if needed. 6/30/22  Yes Historical Provider, MD   folic acid (Folvite) 1 mg tablet Take 1 tablet (1 mg) by mouth once daily. 4/9/24  Yes CA Grace   tiZANidine (Zanaflex) 4 mg capsule Take 1 capsule (4 mg) by mouth once daily at bedtime. 11/14/23 11/13/24 Yes CA Grace   Fiber Therapy, m-cellulose, 500 mg tablet  3/2/24   Historical Provider, MD   ketoconazole (NIZOral) 2 % cream Apply topically if needed.    Historical Provider, MD   loratadine (Claritin Reditabs) 10 mg disintegrating tablet Take 1 tablet (10 mg) by mouth once daily. 6/11/23   Historical Provider, MD   multivitamin tablet Take 1 tablet by mouth once daily. 4/9/24   CA Grace   omeprazole (PriLOSEC) 40 mg DR capsule Take 1 capsule (40 mg) by mouth once daily in the morning. Take before meals. Do not crush or chew. Open capsule, sprinkle beads on SF jello, pudding or applesauce.  Patient not taking: Reported on 4/22/2024 4/12/24   Luis Armando Benito MD   ondansetron ODT  (Zofran-ODT) 4 mg disintegrating tablet Take 1 tablet (4 mg) by mouth every 6 hours if needed for nausea or vomiting.  Patient not taking: Reported on 2024   Luis Armando Benito MD   oxyCODONE (Roxicodone) 5 mg immediate release tablet Take 1 tablet (5 mg) by mouth every 6 hours if needed for severe pain (7 - 10) or moderate pain (4 - 6) for up to 6 days.  Patient not taking: Reported on 2024  Luis Armando Benito MD     Allergies   Allergen Reactions    Pollen Extracts Runny nose     Social History     Tobacco Use    Smoking status: Former     Current packs/day: 0.00     Average packs/day: 1 pack/day for 2.0 years (2.0 ttl pk-yrs)     Types: Cigarettes     Start date:      Quit date:      Years since quittin.3     Passive exposure: Never    Smokeless tobacco: Never    Tobacco comments:     24:  Verified verbally [facility]   Substance Use Topics    Alcohol use: Not Currently     Comment: 24: Verbalizes hx socially, rarely  [facility]         Chemistry    Lab Results   Component Value Date/Time     2024 0857    K 4.2 2024 0857     2024 0857    CO2 25 2024 0857    BUN 15 2024 0857    CREATININE 0.74 2024 0857    Lab Results   Component Value Date/Time    CALCIUM 9.3 2024 0857    ALKPHOS 40 2024 0857    AST 13 2024 0857    ALT 12 2024 0857    BILITOT 0.3 2024 0857          Lab Results   Component Value Date/Time    WBC 5.4 2024 0857    HGB 12.3 2024 0857    HCT 39.1 2024 0857     2024 0857     Lab Results   Component Value Date/Time    PROTIME 11.0 2024 0857    INR 1.0 2024 0857     Encounter Date: 23   ECG 12 lead (Clinic Performed)   Result Value    Ventricular Rate 66    Atrial Rate 66    WV Interval 190    QRS Duration 86    QT Interval 378    QTC Calculation(Bazett) 396    P Axis 33    R Axis 20    T Axis 26    QRS Count 10    Q Onset 223    P Onset  128    P Offset 185    T Offset 412    QTC Fredericia 390    Narrative    Normal sinus rhythm with sinus arrhythmia  Normal ECG  When compared with ECG of 06-AUG-2023 10:24,  PREVIOUS ECG IS PRESENT  Confirmed by Liam Doss ([id number]) on 11/16/2023 9:38:49 PM     No results found for this or any previous visit from the past 1095 days.   Relevant Problems   Cardiac   (+) Benign hypertension      Neuro   (+) Reaction to chronic stress      Endocrine   (+) Class 3 severe obesity due to excess calories without serious comorbidity with body mass index (BMI) of 50.0 to 59.9 in adult (Multi)   (+) Morbid obesity (Multi)   (+) Morbid obesity due to excess calories (Multi)      Hematology   (+) Anemia      Musculoskeletal   (+) Primary osteoarthritis of both hips      ID   (+) Candidal vulvovaginitis   (+) Hidradenitis suppurativa   (+) Tinea corporis      GYN   (+) Menorrhagia       Clinical information reviewed:   Tobacco  Allergies  Meds   Med Hx  Surg Hx  OB Status  Fam Hx  Soc   Hx        NPO Detail:  NPO/Void Status  Date of Last Liquid: 04/21/24  Time of Last Liquid: 0600  Date of Last Solid: 04/19/24  Time of Last Solid: 1800  Last Intake Type: Clear fluids; Solid meal  Time of Last Void: 0800         Physical Exam    Airway  Mallampati: II     Cardiovascular - normal exam     Dental    Pulmonary    Abdominal            Anesthesia Plan    History of general anesthesia?: yes  History of complications of general anesthesia?: no    ASA 3     general     The patient is not a current smoker.  Patient was not previously instructed to abstain from smoking on day of procedure.  Patient did not smoke on day of procedure.  Education provided regarding risk of obstructive sleep apnea.  intravenous induction   Anesthetic plan and risks discussed with patient.    Plan discussed with CRNA.

## 2024-04-22 NOTE — BRIEF OP NOTE
"Date: 2024  OR Location: [facility] OR    Name: Shoneia M Collins \"Tom\", : 1980, Age: 44 y.o., MRN: [id number], Sex: female    Diagnosis  Pre-op Diagnosis     * Morbid obesity (Multi) [E66.01] Post-op Diagnosis     * Morbid obesity (Multi) [E66.01]     Procedures  LAPAROSCOPY, WITH GASTRIC BYPASS  12701 - TX LAPS GSTR RSTCV PX W/BYP JAMAAL-EN-Y LIMB <150 CM  TAP Block  EGD    Surgeons      * Luis Armando Benito - Primary    Resident/Fellow/Other Assistant:  Surgeons and Role:  * No surgeons found with a matching role *  SA Alec    Procedure Summary  Anesthesia: Consult  ASA: III  Anesthesia Staff: Anesthesiologist: Colt Mendoza MD  CRNA: Corazon Cortes, APRN-CRNA; Grace Hopper APRN-CRNA  Estimated Blood Loss: 5 mL  Intra-op Medications:   Administrations occurring from 0930 to 1230 on 24:   Medication Name Total Dose   BUPivacaine HCl (Marcaine) 0.5 % (5 mg/mL) 30 mL, bupivacaine PF (Marcaine) 0.25 % (2.5 mg/mL) 30 mL, dexAMETHasone (Decadron) 4 mg in sodium chloride 0.9% 100 mL syringe 161 mL   lactated Ringer's infusion 188.33 mL              Anesthesia Record               Intraprocedure I/O Totals          Intake    lactated Ringer's infusion 2000.00 mL    Total Intake 2000 mL       Output    Est. Blood Loss 5 mL    Total Output 5 mL       Net    Net Volume 1995 mL          Specimen: No specimens collected     Staff:   Circulator: Lizz Mayberry RN  Relief Circulator: Jeri Lagos RN  Scrub Person: Sade Almeida          Findings: no hiatal hernia    Complications:  None; patient tolerated the procedure well.     Disposition: PACU - hemodynamically stable.  Condition: stable  Specimens Collected: No specimens collected  Attending Attestation: I was present and scrubbed for the key portions of the procedure.    Luis Armando Benito  Phone Number: [phone]  "

## 2024-04-22 NOTE — PROGRESS NOTES
Follow Up Bariatric Nutrition Assessment    Name: Shoneia M Collins  MRN: [id number]  Date: 04/22/24    Surgery Date:  4/22/24  Surgeon:  Thong  Procedure:  RNYGB    ASSESSMENT:  Current weight: n/a  Previous weight:  329lbs  Initial start weight:  339lbs  EBW: 180lbs      PROGRESS:    Nutrition Interventions for last encounter (date):   Drink 64 oz of fluid daily  Drink enough protein shakes to meet your goal of 60-70 g of protein per day  Take 2 chewable multivitamins, [id number] mg of calcium citrate, and 500 mcg of B12 daily  Get up and walk frequently throughout the day.     CHANGES IN TREATMENT:   Patient met goals:  Yes         24 hour food recall:   Beverages: Okios (20g) 3x per day, soup, herbal tea, water, SF popsicles  Alcohol: no      Vitamins:  Barimelt MVI 1x per day,  200mg Calcium citrate 4x per day   Medications: No current facility-administered medications for this visit.  No current outpatient medications on file.    Facility-Administered Medications Ordered in Other Visits:     acetaminophen (Tylenol) tablet 650 mg, 650 mg, oral, q6h, Luis Armando Benito MD    enoxaparin (Lovenox) syringe 40 mg, 40 mg, subcutaneous, q12h, Luis Armando Benito MD    [START ON 4/23/2024] pantoprazole (ProtoNix) EC tablet 40 mg, 40 mg, oral, Daily before breakfast **OR** [START ON 4/23/2024] esomeprazole (NexIUM) suspension 40 mg, 40 mg, nasoduodenal tube, Daily before breakfast **OR** [START ON 4/23/2024] pantoprazole (ProtoNix) injection 40 mg, 40 mg, intravenous, Daily before breakfast, Luis Armando Benito MD    fluticasone (Flonase) nasal spray 1 spray, 1 spray, Each Nostril, Daily PRN, Luis Armando Benito MD    hydrALAZINE (Apresoline) injection 10 mg, 10 mg, intravenous, q4h PRN, Luis Armando Benito MD    HYDROmorphone (Dilaudid) injection 0.5 mg, 0.5 mg, intravenous, q4h PRN, Luis Armando Benito MD    ketorolac (Toradol) injection 15 mg, 15 mg, intravenous, q6h NABOR, Luis Armando Benito MD    lactated Ringer's infusion, 100 mL/hr,  intravenous, Continuous, Colt Mendoza MD    lactated Ringer's infusion, 100 mL/hr, intravenous, Continuous, Luis Armando Benito MD, Stopped at 04/22/24 1221    naloxone (Narcan) injection 0.2 mg, 0.2 mg, intravenous, q5 min PRN, Luis Armando Benito MD    ondansetron ODT (Zofran-ODT) disintegrating tablet 4 mg, 4 mg, oral, q6h **OR** ondansetron (Zofran) injection 4 mg, 4 mg, intravenous, q6h, Luis Armando Benito MD    oxyCODONE (Roxicodone) immediate release tablet 10 mg, 10 mg, oral, q4h PRN, Luis Armando Benito MD    oxyCODONE (Roxicodone) immediate release tablet 5 mg, 5 mg, oral, q4h PRN, Luis Armando Benito MD    oxygen (O2) therapy, , inhalation, Continuous - 02/gases, Luis Armando Benito MD    oxygen (O2) therapy, 2 L/min, inhalation, Continuous - 02/gases, Luis Armando Benito MD    prochlorperazine (Compazine) tablet 10 mg, 10 mg, oral, q6h PRN **OR** prochlorperazine (Compazine) injection 10 mg, 10 mg, intravenous, q6h PRN **OR** prochlorperazine (Compazine) suppository 25 mg, 25 mg, rectal, q12h PRN, Luis Armando Benito MD    scopolamine (Transderm-Scop) patch 1 patch, 1 patch, transdermal, Once, Luis Armando Benito MD, 1 patch at 04/22/24 0820    simethicone (Mylicon) chewable tablet 80 mg, 80 mg, oral, q4h PRN, Luis Armando Benito MD    sodium chloride 0.9 % with KCl 20 mEq/L infusion, 75 mL/hr, intravenous, Continuous, Luis Armando Benito MD    tiZANidine (Zanaflex) tablet 4 mg, 4 mg, oral, Nightly, Luis Armando Benito MD    Physical Activity: walking around     READINESS TO LEARN:  Motivation to learn:  Interested     Understanding of instruction: Good    Anticipated Compliance:   Good     Family Support: Unable to assess-family not present     Patient presents with post-op weight loss surgery: gastric bypass. The pt is 1 week post op.   Patient tolerating liquid diet.  Protein intake is adequate for post-op individual. Cannot tolerate protein shakes and has been using greek yogurt to meet her protein goal. Will be trying protein water to help  her meet her goals.  Fluid consumption is adequate. The pt is doing well and can advance to purees on 5/1. Patient is supplementing recommended vitamin/minerals, but needs to take more Ca. Pt states no concerns and/or difficulties.   Reviewed the puree and soft foods. Reminded pt. to eat slowly, chew thoroughly and to try one new food at a time.    Malnutrition Screening  Significant unintentional weight loss? n/a  Eating less than 75% of usual intake for more than 2 weeks? n/a      Nutrition Diagnosis:   Increased protein and nutrition needs related to altered GI function as evidenced by pt. s/p gastric bypass.  Food- and nutrition-related knowledge deficit related to lack of prior exposure to surgical weight loss information as evidenced by diet recall.     Nutrition Interventions:   Modify type and amount of food and nutrients within meals and snacks.  Comprehensive Nutrition Education  -Nutrition education materials: none        Recommendations:    Continue to eat yogurt to help you meet your goal of 60-70 g of protein per day. Try protein water. Begin measuring how much protein you can eat on the soft diet so that you know when to start weaning off of your protein shakes.   Continue to drink 64 oz. of zero calorie beverages per day  Continue no drinking 30 min before, during the meal and for 30 minutes after the meal  Continue to exercise   Advance to the puree diet May 1st, then soft on May 15th  Remember to eat slowly and chew thoroughly  Try one new food at a time to test for any intolerances.   Continue to take all of your vitamins and minerals.     Nutrition Monitoring and Evaluation:   1-2 pounds weight loss per week  Criteria: weight check, food recall  Need for Follow-up: 6 weeks post op     Leela Chatterjee MS, RD, LD  Phone: [phone]

## 2024-04-22 NOTE — OP NOTE
Operative Report     DATE PERFORMED:  04/22/24     SURGEON:  Luis Armando Benito MD    ASSISTANT: SA Alec     PREOPERATIVE DIAGNOSES:  Morbid obesity with a BMI of 49.8, hypertension, osteoarthritis, and lower extremity edema.    POSTOPERATIVE DIAGNOSES:  Morbid obesity with a BMI of 49.8, hypertension, osteoarthritis, and lower extremity edema.    PROCEDURE:  Laparoscopic Padmini-en-Y gastric bypass, intraoperative  endoscopy, and transverse abdominis plane block.     COMPLICATIONS:  None.     ESTIMATED BLOOD LOSS:  Minimal.     DRAINS:  None.     ANESTHESIA:  General endotracheal.     SPECIMENS:  None.     BRIEF HISTORY:   Shoneia M Collins is a 44 y.o.-year-old female, morbidly obese with  a BMI of 49.8.   she also suffers from obesity related comorbidities  including hypertension, osteoarthritis, and lower extremity edema.   she has attempted  and failed multiple diet and exercise regimens in the past.   she was  thoroughly evaluated in a multidisciplinary approach and found to be an appropriate candidate  for laparoscopic Padmini-en-Y gastric bypass surgery.  The risks and  benefits of procedure were explained to the patient and she expressed  understanding and consent.     PROCEDURE IN DETAIL:  On the day of the operation, Shoneia M Collins was  appropriately identified in the holding area.   The patient was then taken back  to the operating room, placed in a supine position on the operating room  table.   she was given  appropriate perioperative IV antibiotics and heparin subcu.  SCDs were  placed. General endotracheal anesthesia was induced.   The patient was prepped and draped in normal sterile fashion.  An  appropriate OR time-out was performed.  A 5 mm incision was made in the  left upper quadrant at Jorge point and a Veress needle was inserted.  The abdomen was insufflated with CO2 and a 5 mm Optiview trocar was  inserted under direct visualization just to the left of midline and  above the level of the  umbilicus.  The upper abdomen was surveilled and  Some omental adhesion to the anterior abdominal wall were found. The following trocars were then  placed by the surgeon and assistant under direct visualization with the laparoscope.  A 5 mm trocar  was placed in the left upper quadrant at Jorge point and a 5 mm trocar  was placed in the left upper quadrant anterior axillary line. Through these trocars we carefully took down the adhesions with the Ligasure.  The assistant then proceeded to perform a bilateral  transverse abdominis plane block using a mixture of marcaine, decadron, and saline.  This mixture was also used to anesthetize the  sites of our other trocar placements.  A 5 mm  trocar was placed in the right upper quadrant anterior axillary line and  in the right upper quadrant midclavicular line below the costal margin.  Finally, a 12 mm trocar was placed in the right upper quadrant  midclavicular line above the level of the umbilicus.  The assistant then reflected  the omentum and transverse colon cranially and the ligament of Treitz was located.  The small bowel was then  run 100 cm distal to the ligament of Treitz and transected with a white  load of the Endo stapler.  At this point, the mesentery was further  Transected down to its root using the Ligasure device.  Retraction was provided by the assistant. Clips were placed along the  mesentery for hemostasis.  The distal portion of transected bowel was  marked with a 2-0 Surgidac suture to chris it as the Padmini limb.  We then  ran the jejunum another 150 cm distally.  This point was aligned with  the biliopancreatic limb and a stay suture was placed between the anti-  mesenteric border of these two loops of bowel.  Retraction was provided by the assistant. We then created  enterotomies with the hook electrocautery in the anti-mesenteric border of  the biliopancreatic limb and distal bowel.  A white load Endo stapler  was used to create a [id number]  anastomosis here.  With the help of the assistant, the common enterotomy  was closed with another fire of the white load Endo stapler.  A crotch  stitch was placed to prevent tension on the anastomosis with a 2-0  Surgidac and a suture was placed to prevent kinking of the proximal  bowel as it entered the anastomosis.  Finally, the mesenteric defect was  closed with a running 2-0 Surgidac suture.  Retraction was provided by the assistant. After once more checking for  hemostasis, we proceeded to direct our attention cranially.  With the help of the assistant ,  the omentum was divided by with the Ligasure to decrease the tension on the Padmini limb as this was brought  up to the stomach.  We then placed the patient in steep reverse  Trendelenburg position and placed the snake liver retractor to retract  the left lobe of the liver away from the diaphragmatic hiatus.  We then  created a defect in the gastrohepatic ligament with the Ligasure, dissecting out this defect until we could visualize the left  gastric pedicle that would supply our gastric pouch.  Retraction was provided by the assistant. With a little bit  of blunt dissection, we were able to dissect under the stomach into the  lesser sac.  We then fired the white load Endo stapler with a SeamGuard  across the remaining gastrohepatic ligament.  As we fired the stapler, the assistant provided necessary retraction. We then started creating  our 15-30 mL gastric pouch with one transverse fire of the green load Endo  stapler with a SeamGuard.  We then fired several more loads of the blue  load Endo stapler with the SeamGuard moving towards the angle of His, fully transecting off  the fundus of the stomach to create our gastric pouch.  The padmini limb was brought up to the gastric pouch without tension by the assistant. An enterotomy was made in the antimesenteric border of the padmini limb and a gastrotomy was made in the distal posterior wall of the gastric pouch using  the hook. A blue load of the endostapler was fired across the stomach and padmini limb to create one wall of our gastrojejunal anastamosis. The common enterotomy was then closed with 2-0 polysorb running suture with the help of the assistant. The endoscope was introduced  through the oropharynx into the esophagus and gastric pouch, through our  gastrojejunal anastomosis, and into our proximal Padmini limb. This closure was oversewn with 2-0 Surgidac imbricated sutures, over the endoscope to prevent narrowing of the gastrojejunostomy. The assistant instilled saline into the epigastrum. Air was  instilled via the endoscope.  There were no bubbles and thus the leak test was negative. This was monitored by the assistant.   We also visualized a wide open gastrojejunal anastomosis with no active  bleeding.  The air was suctioned out of the stomach and the scope was  removed.  The assistant then removed the bowel clamp and suctioned all the  irrigation from the abdominal cavity.  The omentum was then secured over  our newly created anastomosis to the gastrohepatic ligament with 2-0  Surgidac sutures.  Escalona's defect was closed with a 2-0 Surgidac purse-string suture. Retraction was provided by the assistant. We removed the liver retractor, examined her abdomen  and satisfied with hemostasis, we closed our 12 mm trocar  site with several 0 Vicryl suture using the Musa-Ronnie device with the help of the assistant.  We then removed the camera, desufflated the abdomen of Co2, and removed our  trocars.  The skin incisions were all closed with 4-0 Monocryl  subcuticular sutures and Dermabond by the assistant. The patient tolerated  the procedure without difficulty, was extubated immediately  postoperatively, was transferred to the PACU in good condition.  All  sponge and instrument counts were correct.     This operation could not have been safely performed (without compromising the technical results or length of the procedure)  without the assistance of a skilled surgical assistant. A surgical assistant was medically necessary for positioning, retraction and instrumentation.          JOSIAH MYERS MD

## 2024-04-22 NOTE — ANESTHESIA POSTPROCEDURE EVALUATION
"Patient: Shoneia M Collins \"Tom\"    Procedure Summary       Date: 04/22/24 Room / Location: [facility] 08 / [facility]    Anesthesia Start: 1007 Anesthesia Stop: 1239    Procedure: LAPAROSCOPY, WITH GASTRIC BYPASS Diagnosis:       Morbid obesity (Multi)      (Morbid obesity (CMS/HCC) [E66.01])    Surgeons: Luis Armando Benito MD Responsible Provider: Colt Mendoza MD    Anesthesia Type: general ASA Status: 3            Anesthesia Type: general    Vitals Value Taken Time   /55 04/22/24 1347   Temp 36.6 °C (97.9 °F) 04/22/24 1232   Pulse 90 04/22/24 1352   Resp 14 04/22/24 1347   SpO2 99 % 04/22/24 1352   Vitals shown include unfiled device data.    Anesthesia Post Evaluation    Patient location during evaluation: PACU  Patient participation: complete - patient participated  Level of consciousness: awake  Pain management: adequate  Multimodal analgesia pain management approach  Airway patency: patent  Two or more strategies used to mitigate risk of obstructive sleep apnea  Cardiovascular status: acceptable  Respiratory status: acceptable  Hydration status: acceptable  Postoperative Nausea and Vomiting: none        No notable events documented.    "

## 2024-04-22 NOTE — ANESTHESIA PROCEDURE NOTES
Airway  Date/Time: 4/22/2024 10:17 AM  Urgency: elective    Airway not difficult    Staffing  Performed: CRNA   Authorized by: Colt Mendoza MD    Performed by: TORRES Connors-CRNA  Patient location during procedure: OR    Indications and Patient Condition  Indications for airway management: anesthesia and airway protection  Spontaneous Ventilation: absent  Sedation level: deep  Preoxygenated: yes  Mask difficulty assessment: 1 - vent by mask  Planned trial extubation    Final Airway Details  Final airway type: endotracheal airway      Successful airway: ETT  Cuffed: yes   Successful intubation technique: video laryngoscopy  Facilitating devices/methods: intubating stylet  Endotracheal tube insertion site: oral  Blade size: #4  ETT size (mm): 7.5  Cormack-Lehane Classification: grade I - full view of glottis  Placement verified by: chest auscultation, capnometry and palpation of cuff   Measured from: lips  ETT to lips (cm): 22  Number of attempts at approach: 1

## 2024-04-22 NOTE — CARE PLAN
The patient's goals for the shift include pain control    The clinical goals for the shift include pain control & ambulation

## 2024-04-23 ENCOUNTER — APPOINTMENT (OUTPATIENT)
Dept: RADIOLOGY | Facility: HOSPITAL | Age: 44
End: 2024-04-23
Payer: COMMERCIAL

## 2024-04-23 VITALS
SYSTOLIC BLOOD PRESSURE: 135 MMHG | HEIGHT: 67 IN | RESPIRATION RATE: 18 BRPM | TEMPERATURE: 97.3 F | WEIGHT: 293 LBS | OXYGEN SATURATION: 100 % | BODY MASS INDEX: 45.99 KG/M2 | HEART RATE: 66 BPM | DIASTOLIC BLOOD PRESSURE: 86 MMHG

## 2024-04-23 LAB
ALBUMIN SERPL BCP-MCNC: 3.4 G/DL (ref 3.4–5)
ALP SERPL-CCNC: 32 U/L (ref 33–110)
ALT SERPL W P-5'-P-CCNC: 10 U/L (ref 7–45)
ANION GAP SERPL CALC-SCNC: 9 MMOL/L (ref 10–20)
AST SERPL W P-5'-P-CCNC: 15 U/L (ref 9–39)
BASOPHILS # BLD AUTO: 0 X10*3/UL (ref 0–0.1)
BASOPHILS NFR BLD AUTO: 0 %
BILIRUB SERPL-MCNC: 0.3 MG/DL (ref 0–1.2)
BUN SERPL-MCNC: 11 MG/DL (ref 6–23)
CALCIUM SERPL-MCNC: 8.6 MG/DL (ref 8.6–10.3)
CHLORIDE SERPL-SCNC: 105 MMOL/L (ref 98–107)
CO2 SERPL-SCNC: 25 MMOL/L (ref 21–32)
CREAT SERPL-MCNC: 0.53 MG/DL (ref 0.5–1.05)
EGFRCR SERPLBLD CKD-EPI 2021: >90 ML/MIN/1.73M*2
EOSINOPHIL # BLD AUTO: 0 X10*3/UL (ref 0–0.7)
EOSINOPHIL NFR BLD AUTO: 0 %
ERYTHROCYTE [DISTWIDTH] IN BLOOD BY AUTOMATED COUNT: 15.2 % (ref 11.5–14.5)
GLUCOSE SERPL-MCNC: 106 MG/DL (ref 74–99)
HCT VFR BLD AUTO: 31.4 % (ref 36–46)
HCT VFR BLD AUTO: 32 % (ref 36–46)
HGB BLD-MCNC: 10.2 G/DL (ref 12–16)
HGB BLD-MCNC: 9.9 G/DL (ref 12–16)
IMM GRANULOCYTES # BLD AUTO: 0.04 X10*3/UL (ref 0–0.7)
IMM GRANULOCYTES NFR BLD AUTO: 0.5 % (ref 0–0.9)
LYMPHOCYTES # BLD AUTO: 0.95 X10*3/UL (ref 1.2–4.8)
LYMPHOCYTES NFR BLD AUTO: 12 %
MAGNESIUM SERPL-MCNC: 1.79 MG/DL (ref 1.6–2.4)
MCH RBC QN AUTO: 25.5 PG (ref 26–34)
MCHC RBC AUTO-ENTMCNC: 31.9 G/DL (ref 32–36)
MCV RBC AUTO: 80 FL (ref 80–100)
MONOCYTES # BLD AUTO: 0.55 X10*3/UL (ref 0.1–1)
MONOCYTES NFR BLD AUTO: 6.9 %
NEUTROPHILS # BLD AUTO: 6.39 X10*3/UL (ref 1.2–7.7)
NEUTROPHILS NFR BLD AUTO: 80.6 %
NRBC BLD-RTO: 0 /100 WBCS (ref 0–0)
PLATELET # BLD AUTO: 252 X10*3/UL (ref 150–450)
POTASSIUM SERPL-SCNC: 4 MMOL/L (ref 3.5–5.3)
PROT SERPL-MCNC: 6.6 G/DL (ref 6.4–8.2)
RBC # BLD AUTO: 4 X10*6/UL (ref 4–5.2)
SODIUM SERPL-SCNC: 135 MMOL/L (ref 136–145)
WBC # BLD AUTO: 7.9 X10*3/UL (ref 4.4–11.3)

## 2024-04-23 PROCEDURE — 74220 X-RAY XM ESOPHAGUS 1CNTRST: CPT

## 2024-04-23 PROCEDURE — 83735 ASSAY OF MAGNESIUM: CPT | Performed by: SURGERY

## 2024-04-23 PROCEDURE — 36415 COLL VENOUS BLD VENIPUNCTURE: CPT | Performed by: SURGERY

## 2024-04-23 PROCEDURE — 2550000001 HC RX 255 CONTRASTS: Performed by: SURGERY

## 2024-04-23 PROCEDURE — 74220 X-RAY XM ESOPHAGUS 1CNTRST: CPT | Performed by: RADIOLOGY

## 2024-04-23 PROCEDURE — 2500000004 HC RX 250 GENERAL PHARMACY W/ HCPCS (ALT 636 FOR OP/ED): Performed by: SURGERY

## 2024-04-23 PROCEDURE — 2500000005 HC RX 250 GENERAL PHARMACY W/O HCPCS: Performed by: SURGERY

## 2024-04-23 PROCEDURE — C9113 INJ PANTOPRAZOLE SODIUM, VIA: HCPCS | Performed by: SURGERY

## 2024-04-23 PROCEDURE — 99024 POSTOP FOLLOW-UP VISIT: CPT | Performed by: SURGERY

## 2024-04-23 PROCEDURE — 85014 HEMATOCRIT: CPT | Performed by: SURGERY

## 2024-04-23 PROCEDURE — 2500000001 HC RX 250 WO HCPCS SELF ADMINISTERED DRUGS (ALT 637 FOR MEDICARE OP): Performed by: SURGERY

## 2024-04-23 PROCEDURE — 85025 COMPLETE CBC W/AUTO DIFF WBC: CPT | Performed by: SURGERY

## 2024-04-23 PROCEDURE — 80053 COMPREHEN METABOLIC PANEL: CPT | Performed by: SURGERY

## 2024-04-23 RX ORDER — ACETAMINOPHEN 325 MG/1
650 TABLET ORAL EVERY 6 HOURS
Start: 2024-04-23 | End: 2024-05-25

## 2024-04-23 RX ORDER — SIMETHICONE 80 MG
80 TABLET,CHEWABLE ORAL EVERY 4 HOURS PRN
Start: 2024-04-23 | End: 2024-05-10 | Stop reason: WASHOUT

## 2024-04-23 RX ADMIN — KETOROLAC TROMETHAMINE 15 MG: 15 INJECTION, SOLUTION INTRAMUSCULAR; INTRAVENOUS at 11:45

## 2024-04-23 RX ADMIN — ACETAMINOPHEN 650 MG: 325 TABLET ORAL at 02:37

## 2024-04-23 RX ADMIN — KETOROLAC TROMETHAMINE 15 MG: 15 INJECTION, SOLUTION INTRAMUSCULAR; INTRAVENOUS at 00:14

## 2024-04-23 RX ADMIN — ENOXAPARIN SODIUM 40 MG: 40 INJECTION SUBCUTANEOUS at 08:54

## 2024-04-23 RX ADMIN — SODIUM CHLORIDE 1000 ML: 9 INJECTION, SOLUTION INTRAVENOUS at 10:30

## 2024-04-23 RX ADMIN — ONDANSETRON 4 MG: 2 INJECTION INTRAMUSCULAR; INTRAVENOUS at 16:03

## 2024-04-23 RX ADMIN — ACETAMINOPHEN 650 MG: 325 TABLET ORAL at 16:03

## 2024-04-23 RX ADMIN — Medication: at 08:00

## 2024-04-23 RX ADMIN — POTASSIUM CHLORIDE AND SODIUM CHLORIDE 75 ML/HR: 900; 150 INJECTION, SOLUTION INTRAVENOUS at 11:45

## 2024-04-23 RX ADMIN — KETOROLAC TROMETHAMINE 15 MG: 15 INJECTION, SOLUTION INTRAMUSCULAR; INTRAVENOUS at 05:48

## 2024-04-23 RX ADMIN — PANTOPRAZOLE SODIUM 40 MG: 40 INJECTION, POWDER, FOR SOLUTION INTRAVENOUS at 05:48

## 2024-04-23 RX ADMIN — HYDRALAZINE HYDROCHLORIDE 10 MG: 20 INJECTION INTRAMUSCULAR; INTRAVENOUS at 02:41

## 2024-04-23 RX ADMIN — OXYCODONE HYDROCHLORIDE 10 MG: 10 TABLET ORAL at 06:24

## 2024-04-23 RX ADMIN — ACETAMINOPHEN 650 MG: 325 TABLET ORAL at 08:54

## 2024-04-23 RX ADMIN — ONDANSETRON 4 MG: 2 INJECTION INTRAMUSCULAR; INTRAVENOUS at 02:37

## 2024-04-23 RX ADMIN — DIATRIZOATE MEGLUMINE AND DIATRIZOATE SODIUM 30 ML: 660; 100 LIQUID ORAL; RECTAL at 08:36

## 2024-04-23 RX ADMIN — OXYCODONE HYDROCHLORIDE 10 MG: 10 TABLET ORAL at 12:48

## 2024-04-23 RX ADMIN — ONDANSETRON 4 MG: 2 INJECTION INTRAMUSCULAR; INTRAVENOUS at 08:54

## 2024-04-23 RX ADMIN — HYDRALAZINE HYDROCHLORIDE 10 MG: 20 INJECTION INTRAMUSCULAR; INTRAVENOUS at 06:23

## 2024-04-23 ASSESSMENT — COGNITIVE AND FUNCTIONAL STATUS - GENERAL
CLIMB 3 TO 5 STEPS WITH RAILING: A LITTLE
WALKING IN HOSPITAL ROOM: A LITTLE
MOBILITY SCORE: 22
TOILETING: A LITTLE
DAILY ACTIVITIY SCORE: 22
DRESSING REGULAR LOWER BODY CLOTHING: A LITTLE

## 2024-04-23 ASSESSMENT — PAIN SCALES - GENERAL
PAINLEVEL_OUTOF10: 7
PAINLEVEL_OUTOF10: 7

## 2024-04-23 ASSESSMENT — PAIN - FUNCTIONAL ASSESSMENT: PAIN_FUNCTIONAL_ASSESSMENT: 0-10

## 2024-04-23 ASSESSMENT — ACTIVITIES OF DAILY LIVING (ADL): LACK_OF_TRANSPORTATION: NO

## 2024-04-23 NOTE — DISCHARGE INSTRUCTIONS
Damarisneleigh VO Atilio  1980    Date of admission: 4/22/24    Date of discharge: 04/23/24    DISPOSITION: home    PRINCIPAL DIAGNOSIS:  Morbid Obesity    OTHER DIAGNOSES:  Principal Problem:    Morbid obesity (Multi)  Active Problems:    Morbid obesity due to excess calories (Multi)      Primary Care:     Oliva Davila, APRN-CNP  Other Providers:         Procedures while hospitalized:  Laparoscopic bakari en y gastric bypass    PENDING RESULTS:  none    Wound care: You may shower and wash your abdomen with mild soap and water. Pat incisions to dry.  Do not soak in a bath or pool for at least 2 weeks. Your incisions are closed with dissolvable stitches and skin glue. The glue will peel off on its own after about 1 week.    Activity: You may walk and climb stairs as tolerated. You should not lie or sit down for more than 1 hour at a time except for when sleeping at night. Activity is important to prevent blood clots. No heavy lifting of objects greater than 10 lbs (or a gallon of milk).    No driving or returning to work until you no longer require narcotic pain medications (Roxicodone).    Diet: You will be on a Phase 2 diet for the next 10-14 days. This includes sugar-free, non-carbonated clear liquids, thick liquids (like strained cream soups, sugar-free pudding, and protein shakes). After about 2 weeks or when cleared by your surgeon/dietician, you may begin the Phase 3 pureed diet (as per your Bariatric Guidebook).    Remember to drink at least 64 oz of liquids per day and try to take in 40-60 grams of protein.     Avoid straws and carbonated beverages. Take small sips every couple of minutes. Your water bottle is your best friend and you shouldn't go anywhere without it!     Warning: If you experience severe pain, fever over 101 degrees F, redness or drainage from incisions, nausea/vomiting that lasts more than 12 hours, rapid heart rate or shortness of breath, call your surgeon immediately.       Your medication  list        START taking these medications        Instructions Last Dose Given Next Dose Due   acetaminophen 325 mg tablet  Commonly known as: Tylenol      Take 2 tablets (650 mg) by mouth every 6 hours.       omeprazole 40 mg DR capsule  Commonly known as: PriLOSEC      Take 1 capsule (40 mg) by mouth once daily in the morning. Take before meals. Do not crush or chew. Open capsule, sprinkle beads on SF jello, pudding or applesauce.       ondansetron ODT 4 mg disintegrating tablet  Commonly known as: Zofran-ODT      Take 1 tablet (4 mg) by mouth every 6 hours if needed for nausea or vomiting.       oxyCODONE 5 mg immediate release tablet  Commonly known as: Roxicodone      Take 1 tablet (5 mg) by mouth every 6 hours if needed for severe pain (7 - 10) or moderate pain (4 - 6) for up to 6 days.       simethicone 80 mg chewable tablet  Commonly known as: Mylicon      Chew 1 tablet (80 mg) every 4 hours if needed for flatulence (gas pain).              CONTINUE taking these medications        Instructions Last Dose Given Next Dose Due   calcium citrate 200 mg (950 mg) tablet  Commonly known as: Calcitrate      Take 1 tablet (200 mg) by mouth once daily.       clindamycin 1 % lotion  Commonly known as: Cleocin T           cyanocobalamin 500 mcg tablet  Commonly known as: Vitamin B-12      Take 1 tablet (500 mcg) by mouth once daily.       ferrous sulfate (325 mg ferrous sulfate) tablet      Take 1 tablet by mouth once daily.       Fiber Therapy (m-cellulose) 500 mg tablet  Generic drug: methylcellulose (laxative)           fluticasone 50 mcg/actuation nasal spray  Commonly known as: Flonase           folic acid 1 mg tablet  Commonly known as: Folvite      Take 1 tablet (1 mg) by mouth once daily.       ketoconazole 2 % cream  Commonly known as: NIZOral           loratadine 10 mg disintegrating tablet  Commonly known as: Claritin Reditabs           multivitamin tablet      Take 1 tablet by mouth once daily.               STOP taking these medications      famotidine 20 mg tablet  Commonly known as: Pepcid               ASK your doctor about these medications        Instructions Last Dose Given Next Dose Due   tiZANidine 4 mg capsule  Commonly known as: Zanaflex      Take 1 capsule (4 mg) by mouth once daily at bedtime.                 Where to Get Your Medications        Information about where to get these medications is not yet available    Ask your nurse or doctor about these medications  acetaminophen 325 mg tablet  simethicone 80 mg chewable tablet         You do not need to begin taking your vitamins until instructed by your surgeon at your first follow-up visit.    Please consult your medical doctor regarding adjusting your medications after surgery (particularly diabetic and blood pressure medications).    Follow up with Dr Benito in her [facility] office on 5/10/24.

## 2024-04-23 NOTE — DISCHARGE SUMMARY
Discharge Diagnosis  Morbid obesity (Multi)    Issues Requiring Follow-Up  none    Test Results Pending At Discharge  Pending Labs       No current pending labs.            Hospital Course   Shoneia M Collins underwent an uncomplicated laparoscopy Padmini en Y gastric bypass 4/22/24 by Dr Benito. she recovered on the regular nursing floor. she was started on a clear liquid diet. On POD#1 she had an esophagram which showed no leak or obstruction. she was then advanced to a full liquid diet. On discharge, her pain was controlled on oral pain medications, ambulating and voiding without difficulty, and she was tolerating an adequate amount of liquids by mouth. she was     Home Medications     Medication List      START taking these medications     acetaminophen 325 mg tablet; Commonly known as: Tylenol; Take 2 tablets   (650 mg) by mouth every 6 hours.   omeprazole 40 mg DR capsule; Commonly known as: PriLOSEC; Take 1 capsule   (40 mg) by mouth once daily in the morning. Take before meals. Do not   crush or chew. Open capsule, sprinkle beads on SF jello, pudding or   applesauce.   ondansetron ODT 4 mg disintegrating tablet; Commonly known as:   Zofran-ODT; Take 1 tablet (4 mg) by mouth every 6 hours if needed for   nausea or vomiting.   oxyCODONE 5 mg immediate release tablet; Commonly known as: Roxicodone;   Take 1 tablet (5 mg) by mouth every 6 hours if needed for severe pain (7 -   10) or moderate pain (4 - 6) for up to 6 days.   simethicone 80 mg chewable tablet; Commonly known as: Mylicon; Chew 1   tablet (80 mg) every 4 hours if needed for flatulence (gas pain).     CONTINUE taking these medications     calcium citrate 200 mg (950 mg) tablet; Commonly known as: Calcitrate;   Take 1 tablet (200 mg) by mouth once daily.   clindamycin 1 % lotion; Commonly known as: Cleocin T   cyanocobalamin 500 mcg tablet; Commonly known as: Vitamin B-12; Take 1   tablet (500 mcg) by mouth once daily.   ferrous sulfate (325 mg ferrous  sulfate) tablet; Take 1 tablet by mouth   once daily.   Fiber Therapy (m-cellulose) 500 mg tablet; Generic drug: methylcellulose   (laxative)   fluticasone 50 mcg/actuation nasal spray; Commonly known as: Flonase   folic acid 1 mg tablet; Commonly known as: Folvite; Take 1 tablet (1 mg)   by mouth once daily.   ketoconazole 2 % cream; Commonly known as: NIZOral   loratadine 10 mg disintegrating tablet; Commonly known as: Claritin   Reditabs   multivitamin tablet; Take 1 tablet by mouth once daily.     STOP taking these medications     famotidine 20 mg tablet; Commonly known as: Pepcid     ASK your doctor about these medications     tiZANidine 4 mg capsule; Commonly known as: Zanaflex; Take 1 capsule (4   mg) by mouth once daily at bedtime.       Outpatient Follow-Up  Future Appointments   Date Time Provider Department Center   4/29/2024  3:30 PM Leela Chatterjee RDN, DIANE [id number] [facility]   5/7/2024  3:00 PM Carli Perez, PhD [id number] [facility]   5/10/2024  9:30 AM MD Audrey [id number] [facility]   6/5/2024  1:30 PM Leela Chatterjee RDN, DIANE [id number] [facility]   6/7/2024  9:30 AM MD Audrey [id number] [facility]   7/3/2024  4:30 PM Liam Doss MD [id number] [facility]   7/17/2024  1:00 PM Leela Chatterjee RDN, LD [id number] [facility]   7/18/2024  2:45 PM Courtney Addison, APRN-CNP [id number] [facility]       Luis Armando Benito MD

## 2024-04-23 NOTE — CONSULTS
Received consult to review post-op Bariatric diet with pt.     Procedure: Padmini-en-y GB on (4/22/24)    Pt has Full Liquid diet handout s/p surgery. States has vitamin and protein drinks at home, very prepared. Denies further questions or concerns at this time, aware RD available if needs arise, pt instructed to follow up with out patient bariatric dietitian.     Vitals:    04/22/24 0826   Weight: 144 kg (318 lb 2 oz)      Body mass index is 49.83 kg/m².

## 2024-04-23 NOTE — PROGRESS NOTES
"BARIATRIC SURGERY PROGRESS NOTE    PATIENT NAME: Shoneia M Collins  MRN: [id number]  DATE: 4/23/2024    SUMMARY OF CURRENT STATUS  - POD # 1 s/p LRYGB  - doing well  -esophagram shows no leak  - she is mildly hypertensive and HR increased with ambulation, though she is in 70-80s at rest  - tolerating full liquid diet  - denies any major abdominal pain, nausea, SOB, chest pain, leg pain    TODAY'S ASSESSMENT:  - New complications over the past 24 hours: No  - No concerns overnight  - Pain controlled: Yes  - DVT prophylaxis:  Yes - Lovenox and SCDs  - Diet is currently: Phase 2  - Nausea:  No  - Emesis:  No  - Pt has sat in the chair / ambulated    ON EXAMINATION:  /79 (Patient Position: Lying)   Pulse 79   Temp 37.2 °C (98.9 °F) (Tympanic)   Resp 17   Ht 1.702 m (5' 7\")   Wt 144 kg (318 lb 2 oz)   LMP 10/07/2021   SpO2 100%   BMI 49.83 kg/m²   APPEARANCE: NAD, looks well.  ABDOMEN:  Soft, gallo-incisional tenderness, nondistended  WOUND(S):  Incisions Clean / Dry / Intact    INS/OUTS:    Intake/Output Summary (Last 24 hours) at 4/23/2024 1034  Last data filed at 4/23/2024 0500  Gross per 24 hour   Intake 1790 ml   Output 555 ml   Net 1235 ml        BLOOD WORK:  Results for orders placed or performed during the hospital encounter of 04/22/24 (from the past 24 hour(s))   SST TOP   Result Value Ref Range    Extra Tube Hold for add-ons.    Comprehensive metabolic panel   Result Value Ref Range    Glucose 106 (H) 74 - 99 mg/dL    Sodium 135 (L) 136 - 145 mmol/L    Potassium 4.0 3.5 - 5.3 mmol/L    Chloride 105 98 - 107 mmol/L    Bicarbonate 25 21 - 32 mmol/L    Anion Gap 9 (L) 10 - 20 mmol/L    Urea Nitrogen 11 6 - 23 mg/dL    Creatinine 0.53 0.50 - 1.05 mg/dL    eGFR >90 >60 mL/min/1.73m*2    Calcium 8.6 8.6 - 10.3 mg/dL    Albumin 3.4 3.4 - 5.0 g/dL    Alkaline Phosphatase 32 (L) 33 - 110 U/L    Total Protein 6.6 6.4 - 8.2 g/dL    AST 15 9 - 39 U/L    Bilirubin, Total 0.3 0.0 - 1.2 mg/dL    ALT 10 7 - 45 U/L "   CBC and Auto Differential   Result Value Ref Range    WBC 7.9 4.4 - 11.3 x10*3/uL    nRBC 0.0 0.0 - 0.0 /100 WBCs    RBC 4.00 4.00 - 5.20 x10*6/uL    Hemoglobin 10.2 (L) 12.0 - 16.0 g/dL    Hematocrit 32.0 (L) 36.0 - 46.0 %    MCV 80 80 - 100 fL    MCH 25.5 (L) 26.0 - 34.0 pg    MCHC 31.9 (L) 32.0 - 36.0 g/dL    RDW 15.2 (H) 11.5 - 14.5 %    Platelets 252 150 - 450 x10*3/uL    Neutrophils % 80.6 40.0 - 80.0 %    Immature Granulocytes %, Automated 0.5 0.0 - 0.9 %    Lymphocytes % 12.0 13.0 - 44.0 %    Monocytes % 6.9 2.0 - 10.0 %    Eosinophils % 0.0 0.0 - 6.0 %    Basophils % 0.0 0.0 - 2.0 %    Neutrophils Absolute 6.39 1.20 - 7.70 x10*3/uL    Immature Granulocytes Absolute, Automated 0.04 0.00 - 0.70 x10*3/uL    Lymphocytes Absolute 0.95 (L) 1.20 - 4.80 x10*3/uL    Monocytes Absolute 0.55 0.10 - 1.00 x10*3/uL    Eosinophils Absolute 0.00 0.00 - 0.70 x10*3/uL    Basophils Absolute 0.00 0.00 - 0.10 x10*3/uL   Magnesium   Result Value Ref Range    Magnesium 1.79 1.60 - 2.40 mg/dL       IMPRESSION:  - Pt is doing well / as expected s/p Bariatric Surgery  - No new issues/concerns    PLAN POD1:  - bolus 1L for tachycardia with exertion, focus on pain control for hypertension  - Advance to Bariatric Phase 2 diet following esophagram.  Goal 40 oz prior to discharge home, 64 oz daily of fluids.  - Multimodal pain regimen: scheduled tylenol, scheduled toradol, PRN oxycodone  - Encourage Ambulation / use of incentive spirometry  - VTE prophylaxis - Continue with SCDs and Lovenox  - Disposition - Likely home later today versus tomorrow    Luis Armando Benito MD  Bariatric and Minimally Invasive General Surgery

## 2024-04-23 NOTE — CARE PLAN
Problem: Pain  Goal: My pain/discomfort is manageable  Outcome: Progressing     Problem: Safety  Goal: Patient will be injury free during hospitalization  Outcome: Progressing     Problem: Daily Care  Goal: Daily care needs are met  Outcome: Progressing     Problem: Psychosocial Needs  Goal: Demonstrates ability to cope with hospitalization/illness  Outcome: Progressing     Problem: Discharge Barriers  Goal: My discharge needs are met  Outcome: Progressing   The patient's goals for the shift include pain control    The clinical goals for the shift include pain control and ambulation

## 2024-04-23 NOTE — PROGRESS NOTES
04/23/24 1004   Discharge Planning   Living Arrangements Children   Support Systems Family members;Friends/neighbors;Children   Assistance Needed Patient is A&O X3, on room air, independent with ADLs and uses no DME at baseline. Patient has a bedside commode to use post-op. Patient was driving prior to surgery. Patient voices no other DC needs.   Type of Residence Private residence   Number of Stairs to Enter Residence 5   Number of Stairs Within Residence 15   Who is requesting discharge planning? Provider   Home or Post Acute Services None   Patient expects to be discharged to: Home no needs   Does the patient need discharge transport arranged? No   Financial Resource Strain   How hard is it for you to pay for the very basics like food, housing, medical care, and heating? Not hard   Housing Stability   In the last 12 months, was there a time when you were not able to pay the mortgage or rent on time? N   In the last 12 months, how many places have you lived? 1   In the last 12 months, was there a time when you did not have a steady place to sleep or slept in a shelter (including now)? N   Transportation Needs   In the past 12 months, has lack of transportation kept you from medical appointments or from getting medications? no   In the past 12 months, has lack of transportation kept you from meetings, work, or from getting things needed for daily living? No

## 2024-04-24 ENCOUNTER — PHARMACY VISIT (OUTPATIENT)
Dept: PHARMACY | Facility: CLINIC | Age: 44
End: 2024-04-24
Payer: MEDICAID

## 2024-04-24 LAB
COTININE SERPL-MCNC: 22 NG/ML
NICOTINE SERPL-MCNC: <5 NG/ML

## 2024-04-25 ENCOUNTER — TELEPHONE (OUTPATIENT)
Dept: SURGERY | Facility: CLINIC | Age: 44
End: 2024-04-25
Payer: COMMERCIAL

## 2024-04-25 NOTE — TELEPHONE ENCOUNTER
This RN received a message from Dr. Benito about message left on answering service. Per the answering service note, it states that patient had a headache and a fast heart rate. RN gave patient a call when getting into the office and patient stated that after she made that call last night, she fell back asleep and feels better this morning. Patient stated that last night she felt like her heart was pounding out of my chest and had a headache. Patient stated that when she woke up this morning, she had changed positions and her headache had gone away and her heart rate feels normal. RN reassessed how patient was feeling and patient stated that she is feeling better. RN made Dr. Benito aware and educated patient to call with any questions or concerns.

## 2024-04-26 ENCOUNTER — TELEPHONE (OUTPATIENT)
Dept: SURGERY | Facility: CLINIC | Age: 44
End: 2024-04-26
Payer: COMMERCIAL

## 2024-04-26 ENCOUNTER — LAB (OUTPATIENT)
Dept: LAB | Facility: LAB | Age: 44
End: 2024-04-26
Payer: COMMERCIAL

## 2024-04-26 DIAGNOSIS — Z98.84 S/P GASTRIC BYPASS: ICD-10-CM

## 2024-04-26 LAB
ERYTHROCYTE [DISTWIDTH] IN BLOOD BY AUTOMATED COUNT: 15.4 % (ref 11.5–14.5)
HCT VFR BLD AUTO: 32.3 % (ref 36–46)
HGB BLD-MCNC: 10 G/DL (ref 12–16)
MCH RBC QN AUTO: 25.4 PG (ref 26–34)
MCHC RBC AUTO-ENTMCNC: 31 G/DL (ref 32–36)
MCV RBC AUTO: 82 FL (ref 80–100)
NRBC BLD-RTO: 0 /100 WBCS (ref 0–0)
PLATELET # BLD AUTO: 258 X10*3/UL (ref 150–450)
RBC # BLD AUTO: 3.94 X10*6/UL (ref 4–5.2)
WBC # BLD AUTO: 7.1 X10*3/UL (ref 4.4–11.3)

## 2024-04-26 PROCEDURE — 85027 COMPLETE CBC AUTOMATED: CPT

## 2024-04-26 PROCEDURE — 36415 COLL VENOUS BLD VENIPUNCTURE: CPT

## 2024-04-26 NOTE — SIGNIFICANT EVENT
Follow Up Phone Call    Outgoing phone call    Spoke to: Shoneia M Collins Relationship:self   Phone number: [phone]      Outcome: contacted patient/ family   No chief complaint on file.         Diagnosis:Not applicable    Reviewed the importance of meeting protein and fluid goals, patient states she is almost meeting goals. Bowels are moving. Reports black tarry stool. Surgeon aware, labs were ordered and drawn. Pain is managed. She is ambulating. Denies fever,chills, nausea or vomiting. No further questions or concerns.

## 2024-04-26 NOTE — TELEPHONE ENCOUNTER
This patient called RN stating that she had a small bowel movement last night that was black. Patient stated that this morning she was having abdominal pain and had another bowel movement that was black and tarry. Patient stated she has been meeting her fluid goals and her protein intake is good. Patient stated that she has not been feeling lightheaded or dizzy. Rn made Dr. Benito aware who stated she would order a CBC to check patient's blood count. RN made patient aware and patient verbalized understanding.

## 2024-04-29 ENCOUNTER — NUTRITION (OUTPATIENT)
Dept: SURGERY | Facility: CLINIC | Age: 44
End: 2024-04-29
Payer: COMMERCIAL

## 2024-04-29 VITALS — BODY MASS INDEX: 50.75 KG/M2 | HEIGHT: 67 IN

## 2024-04-29 DIAGNOSIS — D60.8: ICD-10-CM

## 2024-05-01 ENCOUNTER — TELEPHONE (OUTPATIENT)
Dept: HEMATOLOGY/ONCOLOGY | Facility: HOSPITAL | Age: 44
End: 2024-05-01
Payer: COMMERCIAL

## 2024-05-01 NOTE — TELEPHONE ENCOUNTER
RN requested clarification on referral diagnosis: red cell aplasia. Did not receive clarification from referring provider. Dr. Young said that this patient does not have red cell aplasia.

## 2024-05-07 ENCOUNTER — TELEMEDICINE (OUTPATIENT)
Dept: BEHAVIORAL HEALTH | Facility: CLINIC | Age: 44
End: 2024-05-07
Payer: COMMERCIAL

## 2024-05-07 DIAGNOSIS — F43.29 STRESS AND ADJUSTMENT REACTION: ICD-10-CM

## 2024-05-07 DIAGNOSIS — Z98.84 S/P GASTRIC BYPASS: ICD-10-CM

## 2024-05-07 PROCEDURE — 3008F BODY MASS INDEX DOCD: CPT | Performed by: PSYCHOLOGIST

## 2024-05-07 PROCEDURE — 90837 PSYTX W PT 60 MINUTES: CPT | Performed by: PSYCHOLOGIST

## 2024-05-07 NOTE — PROGRESS NOTES
Time Started: 302  Time Ended: 400  Total time: 58 minutes  Visit type: virtual    Verbal consent was requested and obtained from patient on this date for a telehealth visit. We discussed that the note will be visible and others healthcare practitioners will have access. The patient has consented to an unrestricted note.      Reason(s) for visit: follow up. S/p, 2 weeks gastric bypass    Challenges: limited support system with family. However, her daughter, her cousin and his wife are her support system.   No problems with swallowing  Protein shakes are gross to her and the smell of the bone broth. These food items make her feel nauseated. Currently, she will not consume either.     Positive adjustments to surgery: She has switched over to yogurt and she can tolerate this. She has been moved up to pureed foods and has tried soft foods with no problem. She did not have hunger/fullness signals until yesterday.   No mood changes.   No changes in taste.  She stated she was sad 1 year ago, she felt like she was breaking up with her old self. She lost 150 lbs before the bariatric surgery. She stated the MBS was her second step so she can have the hip replacement surgery.     Highest weight approx 467   Pre-op: 315 lbs.   She has not had her first weigh -in, post gastric bypass.    Taking her vitamins.   Water intake: she is struggling b/c she feels like she has to drink every 5 minutes. She is consuming about 50 ounces at least.     She is having weird dreams, but especially the first week post surgery. Discussed possible post anesthic. She sated the dreams are funny, not bothersome.       MSE: patient was a little emotional about particular family members, otherwise, reported her mood has been good. She is adjusting well to the surgery. No mental health problems and mild aversions to foods, of which she has made appropriate adjustments. No SI or plan    Follow up: 10am August 20, virtual and notify patient about post  surgery groups.

## 2024-05-08 ENCOUNTER — TELEPHONE (OUTPATIENT)
Dept: SURGERY | Facility: CLINIC | Age: 44
End: 2024-05-08
Payer: COMMERCIAL

## 2024-05-08 NOTE — TELEPHONE ENCOUNTER
Thank you for speaking with me earlier today & confirming your scheduled visit with Dr. Benito on 5/10/24 9:30 AM  at [facility] [address] ([address]) [place] [address] (inside [facility], main floor in the [facility]).   Parking is available at a cost of $5.00 at the Main Entrance.  We look forward to seeing you!  Joyce Haynes, [facility] Nurse.

## 2024-05-10 ENCOUNTER — OFFICE VISIT (OUTPATIENT)
Dept: SURGERY | Facility: CLINIC | Age: 44
End: 2024-05-10
Payer: COMMERCIAL

## 2024-05-10 VITALS
HEART RATE: 85 BPM | DIASTOLIC BLOOD PRESSURE: 85 MMHG | BODY MASS INDEX: 45.97 KG/M2 | WEIGHT: 292.9 LBS | SYSTOLIC BLOOD PRESSURE: 128 MMHG | HEIGHT: 67 IN | OXYGEN SATURATION: 98 % | RESPIRATION RATE: 18 BRPM

## 2024-05-10 DIAGNOSIS — Z90.3 INTESTINAL MALABSORPTION FOLLOWING GASTRECTOMY (HHS-HCC): ICD-10-CM

## 2024-05-10 DIAGNOSIS — K91.2 INTESTINAL MALABSORPTION FOLLOWING GASTRECTOMY (HHS-HCC): ICD-10-CM

## 2024-05-10 DIAGNOSIS — I10 BENIGN HYPERTENSION: ICD-10-CM

## 2024-05-10 DIAGNOSIS — Z98.84 S/P GASTRIC BYPASS: Primary | ICD-10-CM

## 2024-05-10 DIAGNOSIS — E66.01 MORBID OBESITY DUE TO EXCESS CALORIES (MULTI): ICD-10-CM

## 2024-05-10 PROCEDURE — 3008F BODY MASS INDEX DOCD: CPT | Performed by: SURGERY

## 2024-05-10 PROCEDURE — 1036F TOBACCO NON-USER: CPT | Performed by: SURGERY

## 2024-05-10 PROCEDURE — 3079F DIAST BP 80-89 MM HG: CPT | Performed by: SURGERY

## 2024-05-10 PROCEDURE — 3074F SYST BP LT 130 MM HG: CPT | Performed by: SURGERY

## 2024-05-10 PROCEDURE — 99024 POSTOP FOLLOW-UP VISIT: CPT | Performed by: SURGERY

## 2024-05-10 ASSESSMENT — PAIN SCALES - GENERAL: PAINLEVEL: 10-WORST PAIN EVER

## 2024-05-10 NOTE — PROGRESS NOTES
BARIATRIC SURGERY CLINIC  FOLLOW UP NOTE      Name: Shoneia M Collins  MRN: [id number]      Index Surgery  Date of Surgery: 4/22/24   Surgeon: Luis Armando Bentio MD  Surgical Procedure: Laparoscopic bakari en y gastric bypass 58336  Initial weight: 339 lbs  Pre-surgical weight: 322 lbs      Other Bariatric Surgeries  none    Visit: 2    weeks  Today's Visit:   Wt Readings from Last 1 Encounters:   05/10/24 133 kg (292 lb 14.4 oz)    Body mass index is 45.87 kg/m².   Last Visit:    Wt Readings from Last 3 Encounters:   05/10/24 133 kg (292 lb 14.4 oz)   04/23/24 147 kg (324 lb)   04/12/24 146 kg (322 lb 4.8 oz)     Total weight loss: 30 lbs    HPI: doing well. She is working on getting her fluids in with a variety of sources, popsicles, protein water.    DIET INTAKE: Pureed    Diet History:     Fluid intake: 64 oz/day      DAILY SUPPLEMENTS:  Calcium: Calcium Citrate w/ vitamin D (1200 - 1500mg)  Multivitamin & Minerals: 1 per day - bariatric pal combo vitamin  Iron Supplement: included in multi-vitamin  Vitamin B12:  included in multi-vitamin  Vitamin D3:  included in multi-vitamin  Other: none  PPI:omeprazole    EXERCISE: walking, up and down the steps, cleaning around her house    Symptoms:      Nausea/vomiting: denies   Food intolerance: denies   Constipation: denies   Diarrhea: denies   Experiencing dumping: denies   Abdominal pain: denies   Reflux: denies Are you on medications: omeprazole      Current Outpatient Medications   Medication Sig Dispense Refill    acetaminophen (Tylenol) 325 mg tablet Take 2 tablets (650 mg) by mouth every 6 hours.      calcium citrate (Calcitrate) 200 mg (950 mg) tablet Take 1 tablet (200 mg) by mouth once daily. 30 tablet 11    clindamycin (Cleocin T) 1 % lotion Apply topically if needed.      ferrous sulfate, 325 mg ferrous sulfate, tablet Take 1 tablet by mouth once daily. 90 tablet 3    fluticasone (Flonase) 50 mcg/actuation nasal spray Administer into affected nostril(s) if needed.   "    ketoconazole (NIZOral) 2 % cream Apply topically if needed.      multivitamin tablet Take 1 tablet by mouth once daily. 90 tablet 0    omeprazole (PriLOSEC) 40 mg DR capsule Take 1 capsule (40 mg) by mouth once daily in the morning. Take before meals. Do not crush or chew. Open capsule, sprinkle beads on SF jello, pudding or applesauce. 30 capsule 5    tiZANidine (Zanaflex) 4 mg capsule Take 1 capsule (4 mg) by mouth once daily at bedtime. 30 capsule 11    Fiber Therapy, m-cellulose, 500 mg tablet       loratadine (Claritin Reditabs) 10 mg disintegrating tablet Take 1 tablet (10 mg) by mouth once daily.      ondansetron ODT (Zofran-ODT) 4 mg disintegrating tablet Take 1 tablet (4 mg) by mouth every 6 hours if needed for nausea or vomiting. (Patient not taking: Reported on 5/10/2024) 15 tablet 1     No current facility-administered medications for this visit.       Comorbidities:  Benign hypertension  No meds        REVIEW OF SYSTEMS:  CONSTITUTIONAL: Patient denies fevers, chills, sweats and weight changes.  EYES: Patient denies any visual symptoms.  EARS, NOSE, AND THROAT: No difficulties with hearing. No symptoms of rhinitis or sore throat.  CARDIOVASCULAR: Patient denies chest pains, palpitations, orthopnea and paroxysmal nocturnal dyspnea.  RESPIRATORY: No dyspnea on exertion, no wheezing or cough.  GI: No nausea, vomiting, diarrhea, constipation, abdominal pain, hematochezia or melena.  : No urinary hesitancy or dribbling. No nocturia or urinary frequency. No abnormal urethral discharge.  MUSCULOSKELETAL: No myalgias or arthralgias.  NEUROLOGIC: No chronic headaches, no seizures. Patient denies numbness, tingling or weakness.  PSYCHIATRIC: Patient denies problems with mood disturbance. No problems with anxiety.  ENDOCRINE: No excessive urination or excessive thirst.  DERMATOLOGIC: Patient denies any rashes or skin changes.    PHYSICAL EXAM:  /85   Pulse 85   Resp 18   Ht 1.702 m (5' 7\")   Wt 133 " kg (292 lb 14.4 oz)   LMP 10/07/2021   SpO2 98%   BMI 45.87 kg/m²   GENERAL: Obese. No apparent distress. Pt is alert and oriented x3.  HEENT: Head is normocephalic and atraumatic. Extraocular muscles are intact. Pupils are equal, round, and reactive to light and accommodation. Nares appeared normal. Mouth is well hydrated and without lesions. Mucous membranes are moist. Posterior pharynx clear of any exudate or lesions.  NECK: Supple. No carotid bruits. No lymphadenopathy or thyromegaly.  LUNGS: Clear to auscultation.  HEART: Regular rate and rhythm without murmur.  ABDOMEN: Soft, nontender, and nondistended. Positive bowel sounds. No hepatosplenomegaly was noted.  EXTREMITIES: Without any cyanosis, clubbing, rash, lesions or edema.  NEUROLOGIC: Cranial nerves II through XII are grossly intact.  PSYCHIATRIC: Flat affect, but denies suicidal or homicidal ideations.  SKIN: No ulceration or induration present.      A/P: Normal post-OP course    No sign of infection, Doing well, Excellent Pain control, and tolerating diet    Energy: Energy good    Weight loss: Steady    Recommendations: counseled on exercise, No heavy lifting > 10 lbs for: 2 weeks, Fluid intake 60 oz/day, Protein 60 g/day, Plan your meals, and Continue taking vitamins as directed.    Follow up: 4 weeks    RTW note given.    Luis Armando Benito MD  Bariatric and Minimally Invasive General Surgery

## 2024-05-10 NOTE — LETTER
To Whom It May Concern:      Shoneia M Collins  underwent surgery on 4/22/24. she has been cleared to return to work on 5/13/24 with the following restrictions: no heavy lifting > 10 lbs until 5/20/24. Feel free to contact my office with questions or concerns.      Thank you,        Luis Armando Benito MD  Bariatric and Minimally Invasive General Surgery  [facility]

## 2024-05-13 ENCOUNTER — HOSPITAL ENCOUNTER (OUTPATIENT)
Dept: RADIOLOGY | Facility: CLINIC | Age: 44
Discharge: HOME | End: 2024-05-13
Payer: COMMERCIAL

## 2024-05-13 ENCOUNTER — OFFICE VISIT (OUTPATIENT)
Dept: ORTHOPEDIC SURGERY | Facility: CLINIC | Age: 44
End: 2024-05-13
Payer: COMMERCIAL

## 2024-05-13 ENCOUNTER — OFFICE VISIT (OUTPATIENT)
Dept: PAIN MEDICINE | Facility: HOSPITAL | Age: 44
End: 2024-05-13
Payer: COMMERCIAL

## 2024-05-13 VITALS — BODY MASS INDEX: 45.52 KG/M2 | HEIGHT: 67 IN | WEIGHT: 290 LBS

## 2024-05-13 DIAGNOSIS — M25.552 BILATERAL HIP PAIN: ICD-10-CM

## 2024-05-13 DIAGNOSIS — M25.559 HIP PAIN, UNSPECIFIED LATERALITY: ICD-10-CM

## 2024-05-13 DIAGNOSIS — E66.01 MORBID OBESITY (MULTI): ICD-10-CM

## 2024-05-13 DIAGNOSIS — M16.0 BILATERAL PRIMARY OSTEOARTHRITIS OF HIP: Primary | ICD-10-CM

## 2024-05-13 DIAGNOSIS — M16.0 PRIMARY OSTEOARTHRITIS OF BOTH HIPS: Primary | ICD-10-CM

## 2024-05-13 DIAGNOSIS — M25.551 BILATERAL HIP PAIN: ICD-10-CM

## 2024-05-13 PROCEDURE — 99213 OFFICE O/P EST LOW 20 MIN: CPT | Performed by: ANESTHESIOLOGY

## 2024-05-13 PROCEDURE — 1036F TOBACCO NON-USER: CPT | Performed by: ANESTHESIOLOGY

## 2024-05-13 PROCEDURE — 73502 X-RAY EXAM HIP UNI 2-3 VIEWS: CPT | Mod: RIGHT SIDE | Performed by: STUDENT IN AN ORGANIZED HEALTH CARE EDUCATION/TRAINING PROGRAM

## 2024-05-13 PROCEDURE — 3008F BODY MASS INDEX DOCD: CPT | Performed by: ORTHOPAEDIC SURGERY

## 2024-05-13 PROCEDURE — 99214 OFFICE O/P EST MOD 30 MIN: CPT | Performed by: ORTHOPAEDIC SURGERY

## 2024-05-13 PROCEDURE — 3008F BODY MASS INDEX DOCD: CPT | Performed by: ANESTHESIOLOGY

## 2024-05-13 PROCEDURE — 1036F TOBACCO NON-USER: CPT | Performed by: ORTHOPAEDIC SURGERY

## 2024-05-13 PROCEDURE — 73502 X-RAY EXAM HIP UNI 2-3 VIEWS: CPT | Mod: RT

## 2024-05-13 ASSESSMENT — PAIN SCALES - GENERAL: PAINLEVEL: 7

## 2024-05-13 NOTE — PROGRESS NOTES
"History Of Present Illness  Shoneia M Collins \"Tom\" is a 44 y.o. female with a past medical history of HTN, OA, obesity s/p bariatric surgery who presents with chronic R hip pain.    Patient has known severe osteoarthritis in her R hip and has chronic pain for years. She has previously received multiple inter-articular R hip steroid injections however her most recent injection in 2023 was only effective for 2 weeks She has been trying to schedule an RF ablation but there have been issues with insurance preventing her from receiving the procedure. Recently she had bariatric surgery and had to stop her daily meloxicam. Since her surgery, her R hip pain has become intolerable. She has severe sharp R hip pain that is near constant and exacerbated by walking and physical activity. Her symptoms are only improved with rest and sitting with her leg outstretched. She has tried cymbalta but has not had any relief and has stopped taking the medication due to side effects. She has engaged with PT in the past with minimal benefit.     Past Medical History  She has a past medical history of Anemia, Bariatric surgery status, Benign hypertension (10/30/2018), Class 3 severe obesity due to excess calories without serious comorbidity with body mass index (BMI) of 50.0 to 59.9 in adult (Multi) (2023), Encounter for pre-operative cardiovascular clearance (2023), GERD (gastroesophageal reflux disease) (2023), Hidradenitis suppurativa, Hypertrophy of tonsils with hypertrophy of adenoids (2014), Personal history of gestational diabetes (2017), Snoring (11/10/2014), Thrombophlebitis (2023), Varicose veins with pain (2023), Vitamin B12 deficiency (2023), and Vitamin D deficiency (2023).    Surgical History  She has a past surgical history that includes  section, classic (11/10/2014); Gallbladder surgery (11/10/2014); Tubal ligation (2017); Other surgical history " (12/20/2021); Other surgical history (12/20/2021); Other surgical history (12/20/2021); Tonsillectomy; Hysterectomy; and Bariatric Surgery (04/22/2024).     Social History  She reports that she quit smoking about 21 years ago. Her smoking use included cigarettes. She started smoking about 23 years ago. She has a 2 pack-year smoking history. She has never been exposed to tobacco smoke. She has never used smokeless tobacco. She reports that she does not currently use alcohol. She reports current drug use. Drug: Marijuana.    Family History  Family History   Problem Relation Name Age of Onset    Lung disease Mother      Diabetes Paternal Grandmother      Diabetes Other          Allergies  Pollen extracts    Review of Symptoms:   Constitutional: Negative for chills, diaphoresis or fever  HENT: Negative for neck swelling  Eyes:.  Negative for eye pain  Respiratory:.  Negative for cough, shortness of breath or wheezing    Cardiovascular:.  Negative for chest pain or palpitations  Gastrointestinal:.  Negative for abdominal pain, nausea and vomiting  Genitourinary:.  Negative for urgency  Musculoskeletal:  Positive for R hip pain. Positive for joint pain. Denies falls within the past 3 months.  Skin: Negative for wounds or itching   Neurological: Negative for dizziness, seizures, loss of consciousness and weakness  Endo/Heme/Allergies: Does not bruise/bleed easily  Psychiatric/Behavioral: Negative for depression. The patient does not appear anxious.       PHYSICAL EXAM  Vitals signs reviewed  Constitutional:       General: Not in acute distress     Appearance: Normal appearance. Not ill-appearing.  HENT:     Head: Normocephalic and atraumatic  Eyes:     Conjunctiva/sclera: Conjunctivae normal  Cardiovascular:     Rate and Rhythm: Normal rate and regular rhythm  Pulmonary:     Effort: No respiratory distress  Abdominal:     Palpations: Abdomen is soft  Musculoskeletal: RAVI  Skin:     General: Skin is warm and  "dry  Neurological:     General: No focal deficit present  Psychiatric:         Mood and Affect: Mood normal         Behavior: Behavior normal    Advanced Exam   Inspection: No gross deformities, no surgical scars  Palpation: Mild tenderness of R hip  ROM: Normal range of motion of the lumbar flexion extension  Motor: 5-5 strength upper and lower extremities  Sensory: Negative for sensory abnormalities in upper and lower extremities  Reflexes: 2+ reflexes bilateral upper and lower extremities  Hip: Positive for pain with anterior, ateral, posterior palpation of hip joints, positive for internal/external rotation of the hip,      Last Recorded Vitals  There were no vitals taken for this visit.    Relevant Results  Current Outpatient Medications   Medication Instructions    acetaminophen (TYLENOL) 650 mg, oral, Every 6 hours    calcium citrate (CALCITRATE) 200 mg, oral, Daily    clindamycin (Cleocin T) 1 % lotion Topical, As needed    ferrous sulfate, 325 mg ferrous sulfate, tablet 1 tablet, oral, Daily    Fiber Therapy, m-cellulose, 500 mg tablet     fluticasone (Flonase) 50 mcg/actuation nasal spray nasal, As needed    ketoconazole (NIZOral) 2 % cream Topical, As needed    loratadine (Claritin Reditabs) 10 mg disintegrating tablet 1 tablet, oral, Daily    multivitamin tablet 1 tablet, oral, Daily    omeprazole (PRILOSEC) 40 mg, oral, Daily before breakfast, Do not crush or chew. Open capsule, sprinkle beads on SF jello, pudding or applesauce.    ondansetron ODT (ZOFRAN-ODT) 4 mg, oral, Every 6 hours PRN    tiZANidine (ZANAFLEX) 4 mg, oral, Nightly       No results found for this or any previous visit from the past 1000 days.       ASSESSMENT/PLAN  Shoneia M Collins \"Tom\" is a 44 y.o. female with a past medical history of HTN, OA, obesity s/p bariatric surgery who presents with chronic R hip pain.  Based on history, available imaging and physical exam, the patient's primary pain etiology is likely due to severe R " osteoarthritis of the R hip. Pain has progressively worsened over time and has acutely worsened after she has had to stop taking her NSAIDs post-surgery. Patient has severe R hip osteoarthritis that likely needs orthopedic surgery.  The patient has undergone bariatric surgery and her BMI dropped from 66-45.  She has to drop below 42 before she can have the hip replacement but is losing weight at a rapid pace.       Our plan is as follows:  - Recommended patient to meet with her orthopedic surgeon now that she has lost considerable weight post-bariatric surgery.  - Will defer further steroid injections at this time in anticipation of potential surgery  - Continue to participate in physical therapy as well as physician directed home exercises  - Continue pain medications as prescribed       Tejas Howe MD   PGY2 Anesthesiology

## 2024-05-13 NOTE — PROGRESS NOTES
Subjective    Patient ID: Tom Trimble is a 44 y.o. female.    Chief Complaint: Bilateral hip pain    HPI  Patient presents today complaining of bilateral hip pain the pain on the right is worse than the left.  I seen her in the past and recommended weight loss.  Patient is interested in hip replacement surgery.  She recently underwent bypass surgery and she reported that she is lost about 30 pounds.  BMI currently is 45.  Patient is motivated to lose more weight and is to be a candidate for hip replacement surgery.  Patient is worse with activities and better with rest.  Pain radiates into the groin.  This is interfering with activities of daily living.  Objective   Ortho Exam  The patient is well-nourished and well-developed and in no acute distress. The patient displayed normal mood and affect. The patient's pupils were equal, round sclerae are white. Patient's respirations appear to be regular and unlabored.    Patient is able to ambulate with a mild antalgic gait .  Examination of the hip reveals no skin abnormalities.  No tenderness to palpation about the hip, knee and thigh area.  Range of motion of the hip reveals flexion past 90 degrees, patient has full extension, 40 degrees of hip abduction, 30 degrees of abduction, internal rotation to 20 degrees and external rotation to 45 degrees.  There is moderate pain with rotational range of motion of the hip.    Image Results:  I personally reviewed bilateral hip radiograph to review severe right hip osteoarthritis joint space narrowing and flattening of the femoral head cystic changes.    Assessment/Plan   Encounter Diagnoses:  Hip pain, unspecified laterality  Patient has bilateral severe hip osteoarthritis.  Patient has significant loss amount of weight however her BMI still over 40.  The goal is to get the BMI less than 40.  I applauded her for her weight loss.  I referred her for outpatient ultrasound corticosteroid injection in the interim.  She will  perform activities as tolerated.  She is limited as to taking NSAIDs.  I discussed with her that she needs to discuss with his primary care physician as a strategy for management of pain.  Patient was seen in pain management clinic.  She will follow-up with them as needed.  She will come back and see me if her BMI is less than 40 and she wants to pursue hip replacement surgery.  Orders Placed This Encounter    XR hip right with pelvis when performed 2 or 3 views     No follow-ups on file.

## 2024-05-14 ENCOUNTER — HOSPITAL ENCOUNTER (OUTPATIENT)
Dept: RADIOLOGY | Facility: EXTERNAL LOCATION | Age: 44
Discharge: HOME | End: 2024-05-14

## 2024-05-14 ENCOUNTER — OFFICE VISIT (OUTPATIENT)
Dept: ORTHOPEDIC SURGERY | Facility: HOSPITAL | Age: 44
End: 2024-05-14
Payer: COMMERCIAL

## 2024-05-14 DIAGNOSIS — M16.11 ARTHRITIS OF RIGHT HIP: Primary | ICD-10-CM

## 2024-05-14 DIAGNOSIS — M16.0 BILATERAL PRIMARY OSTEOARTHRITIS OF HIP: ICD-10-CM

## 2024-05-14 PROCEDURE — 3008F BODY MASS INDEX DOCD: CPT | Performed by: STUDENT IN AN ORGANIZED HEALTH CARE EDUCATION/TRAINING PROGRAM

## 2024-05-14 PROCEDURE — 2500000005 HC RX 250 GENERAL PHARMACY W/O HCPCS: Performed by: STUDENT IN AN ORGANIZED HEALTH CARE EDUCATION/TRAINING PROGRAM

## 2024-05-14 PROCEDURE — 99214 OFFICE O/P EST MOD 30 MIN: CPT | Performed by: STUDENT IN AN ORGANIZED HEALTH CARE EDUCATION/TRAINING PROGRAM

## 2024-05-14 PROCEDURE — 2500000004 HC RX 250 GENERAL PHARMACY W/ HCPCS (ALT 636 FOR OP/ED): Performed by: STUDENT IN AN ORGANIZED HEALTH CARE EDUCATION/TRAINING PROGRAM

## 2024-05-14 PROCEDURE — 20611 DRAIN/INJ JOINT/BURSA W/US: CPT | Mod: RT | Performed by: STUDENT IN AN ORGANIZED HEALTH CARE EDUCATION/TRAINING PROGRAM

## 2024-05-14 RX ORDER — LIDOCAINE HYDROCHLORIDE 10 MG/ML
2 INJECTION, SOLUTION EPIDURAL; INFILTRATION; INTRACAUDAL; PERINEURAL
Status: COMPLETED | OUTPATIENT
Start: 2024-05-14 | End: 2024-05-14

## 2024-05-14 RX ORDER — METHYLPREDNISOLONE ACETATE 40 MG/ML
40 INJECTION, SUSPENSION INTRA-ARTICULAR; INTRALESIONAL; INTRAMUSCULAR; SOFT TISSUE
Status: COMPLETED | OUTPATIENT
Start: 2024-05-14 | End: 2024-05-14

## 2024-05-14 RX ORDER — ROPIVACAINE HYDROCHLORIDE 5 MG/ML
3 INJECTION, SOLUTION EPIDURAL; INFILTRATION; PERINEURAL
Status: COMPLETED | OUTPATIENT
Start: 2024-05-14 | End: 2024-05-14

## 2024-05-14 RX ADMIN — METHYLPREDNISOLONE ACETATE 40 MG: 40 INJECTION, SUSPENSION INTRA-ARTICULAR; INTRALESIONAL; INTRAMUSCULAR; SOFT TISSUE at 10:08

## 2024-05-14 RX ADMIN — LIDOCAINE HYDROCHLORIDE 2 ML: 10 INJECTION, SOLUTION EPIDURAL; INFILTRATION; INTRACAUDAL; PERINEURAL at 10:08

## 2024-05-14 RX ADMIN — ROPIVACAINE HYDROCHLORIDE 3 ML: 5 INJECTION, SOLUTION EPIDURAL; INFILTRATION; PERINEURAL at 10:08

## 2024-05-14 NOTE — Clinical Note
We did the right hip injection and she is going to do water therapy. She is working on weight loss post gastric bypass, so hopefully she will lose enough to get that right hip with severe OA replaced at some point.

## 2024-05-14 NOTE — PROGRESS NOTES
REFERRAL SOURCE: Dr. Allen - note from 5/14/24 was reviewed    CHIEF COMPLAINT: left hip pain    HISTORY OF PRESENT ILLNESS  Shoneia M Collins is a very pleasant 44 y.o. female with history of gastric bypass surgery April 2024, morbid obesity who is here for evaluation of bilateral hip pain.     5/14/24:  Hip pain has been present for about 7 years without inciting event, located over the bilateral groin, states pain of left hip is similar to right in nature but only 15% of the pain located over the right. Right pain has been much more severe. Describes pain as burning, with associated weakness. IT radiates into the groin. Denies any numbness or tingling. Pain with walking, using thick soled shoes. Tried duloxetine prescribed by Dr. Chinchilla which made patient too sleepy. Lidocaine patches were not helpful. She does use tizanidine nightly which has been somewhat helpful. Works as .    Saw Dr. Allen who recommended left hip replacement but not until patient's BMI is under 40. She recently had gastric bypass and has been losing weight. Staying active with water aerobics and other exercises. She has not done any physical therapy for her bilateral hips. Has stopped taking meloxicam following gastric bypass and pain has been much more severe.    Last hip injection was done over a year ago, was not as helpful as prior hip injections (3 weeks) done by Dr. Chinchilla. This was was her 4th injection into the hip joint. Was seeing Dr. Chinchilla who was planning on doing an RFA but was not able to d/t insurance coverage.    MEDS    Current Outpatient Medications:     acetaminophen (Tylenol) 325 mg tablet, Take 2 tablets (650 mg) by mouth every 6 hours., Disp: , Rfl:     calcium citrate (Calcitrate) 200 mg (950 mg) tablet, Take 1 tablet (200 mg) by mouth once daily., Disp: 30 tablet, Rfl: 11    clindamycin (Cleocin T) 1 % lotion, Apply topically if needed., Disp: , Rfl:     ferrous sulfate, 325 mg ferrous  sulfate, tablet, Take 1 tablet by mouth once daily., Disp: 90 tablet, Rfl: 3    Fiber Therapy, m-cellulose, 500 mg tablet, , Disp: , Rfl:     fluticasone (Flonase) 50 mcg/actuation nasal spray, Administer into affected nostril(s) if needed., Disp: , Rfl:     ketoconazole (NIZOral) 2 % cream, Apply topically if needed., Disp: , Rfl:     loratadine (Claritin Reditabs) 10 mg disintegrating tablet, Take 1 tablet (10 mg) by mouth once daily., Disp: , Rfl:     multivitamin tablet, Take 1 tablet by mouth once daily., Disp: 90 tablet, Rfl: 0    omeprazole (PriLOSEC) 40 mg DR capsule, Take 1 capsule (40 mg) by mouth once daily in the morning. Take before meals. Do not crush or chew. Open capsule, sprinkle beads on SF jello, pudding or applesauce., Disp: 30 capsule, Rfl: 5    ondansetron ODT (Zofran-ODT) 4 mg disintegrating tablet, Take 1 tablet (4 mg) by mouth every 6 hours if needed for nausea or vomiting. (Patient not taking: Reported on 5/10/2024), Disp: 15 tablet, Rfl: 1    tiZANidine (Zanaflex) 4 mg capsule, Take 1 capsule (4 mg) by mouth once daily at bedtime., Disp: 30 capsule, Rfl: 11    ALLERGIES  Allergies   Allergen Reactions    Pollen Extracts Runny nose       PAST MEDICAL HISTORY  Past Medical History:   Diagnosis Date    Anemia     Bariatric surgery status     Benign hypertension 10/30/2018    Class 3 severe obesity due to excess calories without serious comorbidity with body mass index (BMI) of 50.0 to 59.9 in adult (Multi) 08/02/2023    Encounter for pre-operative cardiovascular clearance 11/14/2023    Liam Doss MD For Bariatric Surgery    GERD (gastroesophageal reflux disease) 08/02/2023    Hidradenitis suppurativa     Hypertrophy of tonsils with hypertrophy of adenoids 11/24/2014    Tonsillar and adenoid hypertrophy    Personal history of gestational diabetes 07/13/2017    History of gestational diabetes mellitus (GDM)    Snoring 11/10/2014    Snoring    Thrombophlebitis 08/02/2023    Varicose veins  with pain 2023    Vitamin B12 deficiency 2023    Vitamin D deficiency 2023       PAST SURGICAL HISTORY  Past Surgical History:   Procedure Laterality Date    BARIATRIC [place]GERY  [place]24    [place] [facility] [place], [place]IC  11/10/2014     Section    GALLBLADDER SURGERY  11/10/2014    Gallbladder Surgery    HYSTERECTOMY      OTHER SURGICAL HISTORY  2021    Laparoscopic hysterectomy    OTHER SURGICAL HISTORY  2021    Bilateral salpingectomy    OTHER SURGICAL HISTORY  2021    Cystoscopy    TONSILLECTOMY      TUBAL LIGATION  2017    Tubal Ligation       SOCIAL HISTORY   Social History     Socioeconomic History    Marital status: Single     Spouse name: Not on file    Number of children: Not on file    Years of education: Not on file    Highest education level: Not on file   Occupational History    Not on file   Tobacco Use    Smoking status: Former     Current packs/day: 0.00     Average packs/day: 1 pack/day for 2.0 years (2.0 ttl pk-yrs)     Types: Cigarettes     Start date:      Quit date:      Years since quittin.3     Passive exposure: Never    Smokeless tobacco: Never    Tobacco comments:     5/10/24:  Verified verbally [facility] LPN   Vaping Use    Vaping status: Never Used   Substance and Sexual Activity    Alcohol use: Not Currently     Comment: 5/10/24: Verbalizes none since surgery hx socially, rarely  [facility] LPN    Drug use: Yes     Types: Marijuana     Comment: 5/10/24:  Denies current [facility] LPN    Sexual activity: Yes     Partners: Male     Birth control/protection: Female Sterilization, Condom Male   Other Topics Concern    Not on file   Social History Narrative    Not on file     Social Determinants of Health     Financial Resource Strain: Low Risk  (2024)    Overall Financial Resource Strain (CARDIA)     Difficulty of Paying Living Expenses: Not hard at all   Food Insecurity: No Food Insecurity (2023)    Hunger  Vital Sign     Worried About Running Out of Food in the Last Year: Never true     Ran Out of Food in the Last Year: Never true   Transportation Needs: No Transportation Needs (4/23/2024)    PRAPARE - Transportation     Lack of Transportation (Medical): No     Lack of Transportation (Non-Medical): No   Physical Activity: Not on file   Stress: Not on file   Social Connections: Not on file   Intimate Partner Violence: Not At Risk (10/6/2023)    Humiliation, Afraid, Rape, and Kick questionnaire     Fear of Current or Ex-Partner: No     Emotionally Abused: No     Physically Abused: No     Sexually Abused: No   Housing Stability: Low Risk  (4/23/2024)    Housing Stability Vital Sign     Unable to Pay for Housing in the Last Year: No     Number of Places Lived in the Last Year: 1     Unstable Housing in the Last Year: No       FAMILY HISTORY  Family History   Problem Relation Name Age of Onset    Lung disease Mother      Diabetes Paternal Grandmother      Diabetes Other         REVIEW OF SYSTEMS  Except for those mentioned in the history of present illness, and below, a complete review of systems is negative.     Review of Systems    VITALS  There were no vitals filed for this visit.    PHYSICAL EXAMINATION   GENERAL:  Awake, alert, and oriented, no apparent distress, pleasant, and cooperative  PSYC: Mood is euthymic, affect is congruent  EAR, NOSE, THROAT:  Normocephalic, atraumatic, moist membranes, anicteric sclera  LUNG: Nonlabored breathing  HEART: No clubbing or cyanosis  SKIN: No increased erythema, warmth, rashes, or concerning skin lesions  NEURO: Sensation is intact in the bilateral lower extremities. Strength is grossly 5 out of 5 throughout the bilateral lower extremities, unless noted below.  GAIT: Antalgic.  MSK: Examination of the left hip: Hip range of motion was limited in internal rotation to neutral. Scour's and FAIR were positive. Stinchfield was positive. Log roll was positive. Hallie's was negative.  Ganeslen´s and P4 are negative. No tenderness to palpation over the greater trochanteric region, ASIS, AIIS, adductor tendons, piriformis, ischial tuberosity. John's was negative.    IMAGING STUDIES:   Radiographs the right hip dated 5/14/2024 were personally reviewed and interpreted by me, Dr. Lindsey Dumont, and the findings shared with the patient.  Severe right hip osteoarthrosis with subchondral collapse.     IMPRESSION  #1  Acute exacerbation of chronic right hip osteoarthritis  #2  Acute exacerbation of chronic left hip osteoarthritis    PLAN  The following was discussed with the patient:     Shoneia M Collins is a very pleasant 44 y.o. female with history of gastric bypass surgery April 2024, morbid obesity who is here for evaluation of bilateral hip pain due to acute exacerbation of chronic right and left hip osteoarthritis.  -The diagnosis of hip arthritis was discussed in detail. The only cure for arthritis is a hip replacement. Without curing arthritis, we can improve the pain that the patient experiences and hopefully allow them to continue to do the activities that they enjoy.  -Physical therapy: Work on hip group and core strengthening and hip flexibility to maintain current ROM with PT. The patient was given a referral to water physical therapy today.  -Weight loss and physical activity: The benefits of weight loss and physical activity on improving pain experienced with arthritis were discussed.  -Medications: Continue to take Tylenol over the counter.   -Injections: Injections, such as corticosteroid, can be utilized. The pros, cons, risks, benefits, pre-procedure and post-procedure protocols were discussed.  Proceed with an ultrasound-guided right hip joint corticosteroid injection.  Please see procedure note section for complete details.  Her left hip is not as severe and she may consider an injection for this in the future.  -Follow-up in 3 months, or sooner if needed.    Note sent to   Tiffany.     The patient was counseled to remain active, but avoid activities that worsen symptoms. The patient was in agreement with this plan. All questions were answered to the best of my ability.    PATIENT EDUCATION:  Education was discussed at today's appointment. A learning needs assessment was performed.    Primary learner: Shoneia M Collins  Barriers to learning: None  Preferred language: English  Learning preferences include: Seeing and doing.  Discussed: Diagnosis and treatment plan.  Demonstrated: Understanding of material discussed.  Patient education materials given: None.  Learner response: Learner demonstrated understanding.    This note was dictated using Dragon speech recognition software and was not corrected for spelling or grammatical errors.    Large Jt Asp/Inj: R hip joint on 5/14/2024 10:08 AM  Details: ultrasound-guided  Medications: 40 mg methylPREDNISolone acetate 40 mg/mL; 2 mL lidocaine PF 10 mg/mL (1 %); 3 mL ropivacaine 5 mg/ml (0.5 %)    ANSINJHIPRESRPre-Procedure Diagnosis: right hip pain, right hip osteoarthritis  Post-Procedure Diagnosis: right hip pain, right hip osteoarthritis    Procedure: US-guided right intraarticular hip corticosteroid injection    History of Present Illness: Shoneia M Collins is a pleasant 44 y.o. female with hip pain secondary to hip arthritis who is here for the above procedure for improved pain control.      Medications and allergies were reviewed with the patient. No contraindications were identified.    Informed Consent  Following denial of allergy and review of potential side effects and complications including but not necessarily limited to infection, allergic reaction, local tissue breakdown, systemic effects of corticosteroids, elevation of blood glucose, injury to soft tissue and/or nerves and seizure, the patient indicated understanding and agreed to proceed. Written consent to treatment was obtained and the patient verbalized consent for the  procedure.    Procedural Details  The use of direct ultrasound visualization of the needle (rather than a non-guided injection) was required to increase patient safety by excluding inadvertent intramuscular or intratendinous placement and minimizing bleeding by avoiding osteochondral or vascular injury from the needle.  Additionally, the increased accuracy of placement may increase clinical effectiveness and will allow higher diagnostic specificity when evaluating effectiveness of this injection.    Using ultrasound, a pre-scan of the region was performed to identify the target structure.     The area was prepped with chlorhexidine, then re-examined using the same transducer, a sterile ultrasound transducer cover, and sterile ultrasound gel.    Procedural pause conducted to verify:  correct patient identity, procedure to be performed, and as applicable, correct side and site, correct patient position, and availability of implants, special equipment, or special requirements    Procedure:  Transducer: Curvilinear array transducer.  Patient position: Supine with the hip in a neutral position.  Localization process: With the transducer in an anatomic transverse oblique plane, the anterior hip joint was localized in a short axis view and the femoral head/neck junction was localized in a long axis view.   Local anesthesia: Local anesthesia was obtained with 3 cc of 1% lidocaine.  Needle: A 25-gauge, 2-inch needle was used for local anesthesia and a 22-gauge, 3.5-inch needle was used for the injectate.  Approach: An inferolateral to superomedial, in plane, approach was used to guide the needle tip to the anterior joint recess at the head/neck junction. Os was contacted.   Injection/Aspiration: A mixture of 3 cc of 0.5% ropivocaine and 1 cc of DepoMedrol (40mg/mL) was injected into the right hip joint without complication. Good flow was observed within the anterior joint recess.    Post-procedural care: The patient  tolerated the procedure well and reported complete pain relief during the anesthetic phase. The patient was asked to ice for improved pain control and avoid submerging the area in water for the next 48 hours to help reduce the risk of infection. The patient was instructed to call the office immediately if there are any questions or concerns.      PATIENT EDUCATION:  Education of the diagnosis and treatment plan was discussed at today's appointment. A learning needs assessment was performed. The patient demonstrated understanding.          Patient seen and examined with PM&R resident, Dr. Arellano. History, physical examination, pertinent imaging findings and the plan of care were discussed and I performed the key portions of the history, physical examination, and discussion of the plan of care. I have edited his note and agree with the findings.      Lindsey Dumont MD    [facility]

## 2024-05-22 DIAGNOSIS — A04.8 H. PYLORI INFECTION: ICD-10-CM

## 2024-05-23 ENCOUNTER — DOCUMENTATION (OUTPATIENT)
Dept: SURGERY | Facility: CLINIC | Age: 44
End: 2024-05-23
Payer: COMMERCIAL

## 2024-05-23 RX ORDER — OMEPRAZOLE 20 MG/1
CAPSULE, DELAYED RELEASE ORAL
Qty: 28 CAPSULE | Refills: 0 | Status: SHIPPED | OUTPATIENT
Start: 2024-05-23 | End: 2024-06-04 | Stop reason: SDUPTHER

## 2024-05-23 NOTE — PROGRESS NOTES
This RN received a phone call from patient in regards to the email that she sent Leela Chatterjee, dietician, and also to see if her medication can be switched to a different pharmacy. Patient educated RN that she is having what she believes is a diverticulosis flair up and is having bowel movements that are containing mucous. Patient stated that the only time she has this is when she is having a flair up. Patient stated that she has had to leave work early due to discomfort. RN assessed if patient has had these issues prior to bariatric surgery and patient stated yes and that she is seeing and gastroenterologists. RN educated patient that since she had this issue prior to surgery to reach out to the gastroenterologists. Patient also stated that she is having a hard time with food due to the pain. RN educated patient to make sure that she is staying hydrated and to eat what is tolerated. If the patient feels like she needs to take a step back in the diet, RN educated patient to do so if it makes her discomfort better. Patient verbalized understanding.     Patient also asked if her omeprazole can be sent to a different pharmacy as she does not live close to [facility]. RN educated patient to call [facility] pharmacy and request that they send the prescription to a different pharmacy. Patient verbalized understanding. RN stated to call the bariatric office with any other questions or concerns.

## 2024-05-24 ENCOUNTER — HOSPITAL ENCOUNTER (EMERGENCY)
Facility: HOSPITAL | Age: 44
Discharge: HOME | End: 2024-05-25
Attending: EMERGENCY MEDICINE
Payer: COMMERCIAL

## 2024-05-24 ENCOUNTER — DOCUMENTATION (OUTPATIENT)
Dept: GASTROENTEROLOGY | Facility: CLINIC | Age: 44
End: 2024-05-24
Payer: COMMERCIAL

## 2024-05-24 ENCOUNTER — CLINICAL SUPPORT (OUTPATIENT)
Dept: EMERGENCY MEDICINE | Facility: HOSPITAL | Age: 44
End: 2024-05-24
Payer: COMMERCIAL

## 2024-05-24 ENCOUNTER — APPOINTMENT (OUTPATIENT)
Dept: RADIOLOGY | Facility: HOSPITAL | Age: 44
End: 2024-05-24
Payer: COMMERCIAL

## 2024-05-24 DIAGNOSIS — E66.01 CLASS 3 SEVERE OBESITY DUE TO EXCESS CALORIES WITHOUT SERIOUS COMORBIDITY WITH BODY MASS INDEX (BMI) OF 50.0 TO 59.9 IN ADULT (MULTI): ICD-10-CM

## 2024-05-24 DIAGNOSIS — R10.13 EPIGASTRIC PAIN: ICD-10-CM

## 2024-05-24 DIAGNOSIS — Z98.84 S/P GASTRIC BYPASS: ICD-10-CM

## 2024-05-24 DIAGNOSIS — I10 BENIGN HYPERTENSION: ICD-10-CM

## 2024-05-24 DIAGNOSIS — K21.9 GASTROESOPHAGEAL REFLUX DISEASE, UNSPECIFIED WHETHER ESOPHAGITIS PRESENT: ICD-10-CM

## 2024-05-24 DIAGNOSIS — M16.0 PRIMARY OSTEOARTHRITIS OF BOTH HIPS: ICD-10-CM

## 2024-05-24 DIAGNOSIS — R11.10 POSTOPERATIVE VOMITING: Primary | ICD-10-CM

## 2024-05-24 DIAGNOSIS — E11.9 TYPE 2 DIABETES MELLITUS WITHOUT COMPLICATION, WITHOUT LONG-TERM CURRENT USE OF INSULIN (MULTI): ICD-10-CM

## 2024-05-24 DIAGNOSIS — Z98.890 POSTOPERATIVE VOMITING: Primary | ICD-10-CM

## 2024-05-24 DIAGNOSIS — K57.92 DIVERTICULITIS: Primary | ICD-10-CM

## 2024-05-24 LAB
ALBUMIN SERPL BCP-MCNC: 3.8 G/DL (ref 3.4–5)
ALP SERPL-CCNC: 43 U/L (ref 33–110)
ALT SERPL W P-5'-P-CCNC: 15 U/L (ref 7–45)
ANION GAP SERPL CALC-SCNC: 20 MMOL/L (ref 10–20)
AST SERPL W P-5'-P-CCNC: 17 U/L (ref 9–39)
BASOPHILS # BLD AUTO: 0.02 X10*3/UL (ref 0–0.1)
BASOPHILS NFR BLD AUTO: 0.3 %
BILIRUB SERPL-MCNC: 0.7 MG/DL (ref 0–1.2)
BUN SERPL-MCNC: 4 MG/DL (ref 6–23)
CALCIUM SERPL-MCNC: 9.3 MG/DL (ref 8.6–10.6)
CHLORIDE SERPL-SCNC: 100 MMOL/L (ref 98–107)
CO2 SERPL-SCNC: 20 MMOL/L (ref 21–32)
CREAT SERPL-MCNC: 0.64 MG/DL (ref 0.5–1.05)
EGFRCR SERPLBLD CKD-EPI 2021: >90 ML/MIN/1.73M*2
EOSINOPHIL # BLD AUTO: 0.01 X10*3/UL (ref 0–0.7)
EOSINOPHIL NFR BLD AUTO: 0.1 %
ERYTHROCYTE [DISTWIDTH] IN BLOOD BY AUTOMATED COUNT: 14.8 % (ref 11.5–14.5)
GLUCOSE BLD MANUAL STRIP-MCNC: 107 MG/DL (ref 74–99)
GLUCOSE BLD MANUAL STRIP-MCNC: 74 MG/DL (ref 74–99)
GLUCOSE SERPL-MCNC: 76 MG/DL (ref 74–99)
HCT VFR BLD AUTO: 37.4 % (ref 36–46)
HGB BLD-MCNC: 12.4 G/DL (ref 12–16)
IMM GRANULOCYTES # BLD AUTO: 0.03 X10*3/UL (ref 0–0.7)
IMM GRANULOCYTES NFR BLD AUTO: 0.4 % (ref 0–0.9)
LACTATE SERPL-SCNC: 0.7 MMOL/L (ref 0.4–2)
LIPASE SERPL-CCNC: 22 U/L (ref 9–82)
LYMPHOCYTES # BLD AUTO: 1.63 X10*3/UL (ref 1.2–4.8)
LYMPHOCYTES NFR BLD AUTO: 22.6 %
MAGNESIUM SERPL-MCNC: 1.73 MG/DL (ref 1.6–2.4)
MCH RBC QN AUTO: 25.3 PG (ref 26–34)
MCHC RBC AUTO-ENTMCNC: 33.2 G/DL (ref 32–36)
MCV RBC AUTO: 76 FL (ref 80–100)
MONOCYTES # BLD AUTO: 0.6 X10*3/UL (ref 0.1–1)
MONOCYTES NFR BLD AUTO: 8.3 %
NEUTROPHILS # BLD AUTO: 4.92 X10*3/UL (ref 1.2–7.7)
NEUTROPHILS NFR BLD AUTO: 68.3 %
NRBC BLD-RTO: 0 /100 WBCS (ref 0–0)
PLATELET # BLD AUTO: 233 X10*3/UL (ref 150–450)
POTASSIUM SERPL-SCNC: 3.3 MMOL/L (ref 3.5–5.3)
PROT SERPL-MCNC: 7.6 G/DL (ref 6.4–8.2)
RBC # BLD AUTO: 4.9 X10*6/UL (ref 4–5.2)
SODIUM SERPL-SCNC: 137 MMOL/L (ref 136–145)
WBC # BLD AUTO: 7.2 X10*3/UL (ref 4.4–11.3)

## 2024-05-24 PROCEDURE — 99285 EMERGENCY DEPT VISIT HI MDM: CPT | Mod: 25

## 2024-05-24 PROCEDURE — 96375 TX/PRO/DX INJ NEW DRUG ADDON: CPT

## 2024-05-24 PROCEDURE — 84075 ASSAY ALKALINE PHOSPHATASE: CPT | Performed by: EMERGENCY MEDICINE

## 2024-05-24 PROCEDURE — 83735 ASSAY OF MAGNESIUM: CPT | Performed by: EMERGENCY MEDICINE

## 2024-05-24 PROCEDURE — 36415 COLL VENOUS BLD VENIPUNCTURE: CPT | Performed by: EMERGENCY MEDICINE

## 2024-05-24 PROCEDURE — 74177 CT ABD & PELVIS W/CONTRAST: CPT

## 2024-05-24 PROCEDURE — 85025 COMPLETE CBC W/AUTO DIFF WBC: CPT | Performed by: EMERGENCY MEDICINE

## 2024-05-24 PROCEDURE — 83690 ASSAY OF LIPASE: CPT

## 2024-05-24 PROCEDURE — 83605 ASSAY OF LACTIC ACID: CPT | Performed by: EMERGENCY MEDICINE

## 2024-05-24 PROCEDURE — 2500000005 HC RX 250 GENERAL PHARMACY W/O HCPCS: Mod: SE

## 2024-05-24 PROCEDURE — 93005 ELECTROCARDIOGRAM TRACING: CPT

## 2024-05-24 PROCEDURE — 74177 CT ABD & PELVIS W/CONTRAST: CPT | Mod: FOREIGN READ | Performed by: RADIOLOGY

## 2024-05-24 PROCEDURE — 2550000001 HC RX 255 CONTRASTS: Mod: SE | Performed by: EMERGENCY MEDICINE

## 2024-05-24 PROCEDURE — A9698 NON-RAD CONTRAST MATERIALNOC: HCPCS | Mod: SE | Performed by: STUDENT IN AN ORGANIZED HEALTH CARE EDUCATION/TRAINING PROGRAM

## 2024-05-24 PROCEDURE — 82947 ASSAY GLUCOSE BLOOD QUANT: CPT | Mod: 59

## 2024-05-24 PROCEDURE — 99285 EMERGENCY DEPT VISIT HI MDM: CPT | Performed by: EMERGENCY MEDICINE

## 2024-05-24 PROCEDURE — 82947 ASSAY GLUCOSE BLOOD QUANT: CPT

## 2024-05-24 PROCEDURE — 2550000001 HC RX 255 CONTRASTS: Mod: SE | Performed by: STUDENT IN AN ORGANIZED HEALTH CARE EDUCATION/TRAINING PROGRAM

## 2024-05-24 PROCEDURE — 2500000004 HC RX 250 GENERAL PHARMACY W/ HCPCS (ALT 636 FOR OP/ED): Mod: SE | Performed by: STUDENT IN AN ORGANIZED HEALTH CARE EDUCATION/TRAINING PROGRAM

## 2024-05-24 PROCEDURE — C9113 INJ PANTOPRAZOLE SODIUM, VIA: HCPCS | Mod: SE | Performed by: STUDENT IN AN ORGANIZED HEALTH CARE EDUCATION/TRAINING PROGRAM

## 2024-05-24 PROCEDURE — 2500000004 HC RX 250 GENERAL PHARMACY W/ HCPCS (ALT 636 FOR OP/ED): Mod: SE

## 2024-05-24 PROCEDURE — 80053 COMPREHEN METABOLIC PANEL: CPT | Performed by: EMERGENCY MEDICINE

## 2024-05-24 PROCEDURE — 96361 HYDRATE IV INFUSION ADD-ON: CPT

## 2024-05-24 PROCEDURE — 96374 THER/PROPH/DIAG INJ IV PUSH: CPT

## 2024-05-24 RX ORDER — DEXTROSE 50 % IN WATER (D50W) INTRAVENOUS SYRINGE
25 ONCE
Status: COMPLETED | OUTPATIENT
Start: 2024-05-24 | End: 2024-05-24

## 2024-05-24 RX ORDER — DEXTROSE 50 % IN WATER (D50W) INTRAVENOUS SYRINGE
Status: COMPLETED
Start: 2024-05-24 | End: 2024-05-24

## 2024-05-24 RX ORDER — ONDANSETRON HYDROCHLORIDE 2 MG/ML
4 INJECTION, SOLUTION INTRAVENOUS ONCE
Status: COMPLETED | OUTPATIENT
Start: 2024-05-24 | End: 2024-05-24

## 2024-05-24 RX ORDER — DICYCLOMINE HYDROCHLORIDE 10 MG/1
10 CAPSULE ORAL
Qty: 120 CAPSULE | Refills: 11 | Status: SHIPPED | OUTPATIENT
Start: 2024-05-24 | End: 2025-05-24

## 2024-05-24 RX ORDER — PANTOPRAZOLE SODIUM 40 MG/10ML
40 INJECTION, POWDER, LYOPHILIZED, FOR SOLUTION INTRAVENOUS ONCE
Status: COMPLETED | OUTPATIENT
Start: 2024-05-24 | End: 2024-05-24

## 2024-05-24 RX ORDER — ONDANSETRON HYDROCHLORIDE 2 MG/ML
INJECTION, SOLUTION INTRAVENOUS
Status: COMPLETED
Start: 2024-05-24 | End: 2024-05-24

## 2024-05-24 RX ORDER — ACETAMINOPHEN 325 MG/1
650 TABLET ORAL ONCE
Status: COMPLETED | OUTPATIENT
Start: 2024-05-24 | End: 2024-05-24

## 2024-05-24 RX ADMIN — DEXTROSE MONOHYDRATE 25 G: 25 INJECTION, SOLUTION INTRAVENOUS at 15:44

## 2024-05-24 RX ADMIN — ONDANSETRON 4 MG: 2 INJECTION INTRAMUSCULAR; INTRAVENOUS at 20:06

## 2024-05-24 RX ADMIN — IOHEXOL 90 ML: 350 INJECTION, SOLUTION INTRAVENOUS at 22:33

## 2024-05-24 RX ADMIN — ONDANSETRON HYDROCHLORIDE 4 MG: 2 INJECTION, SOLUTION INTRAVENOUS at 15:45

## 2024-05-24 RX ADMIN — SODIUM CHLORIDE, POTASSIUM CHLORIDE, SODIUM LACTATE AND CALCIUM CHLORIDE 1000 ML: 600; 310; 30; 20 INJECTION, SOLUTION INTRAVENOUS at 18:34

## 2024-05-24 RX ADMIN — ONDANSETRON 4 MG: 2 INJECTION INTRAMUSCULAR; INTRAVENOUS at 18:45

## 2024-05-24 RX ADMIN — DEXTROSE 50 % IN WATER (D50W) INTRAVENOUS SYRINGE 25 G: at 15:44

## 2024-05-24 RX ADMIN — PANTOPRAZOLE SODIUM 40 MG: 40 INJECTION, POWDER, FOR SOLUTION INTRAVENOUS at 20:06

## 2024-05-24 RX ADMIN — IOHEXOL 500 ML: 12 SOLUTION ORAL at 20:06

## 2024-05-24 RX ADMIN — ACETAMINOPHEN 650 MG: 325 TABLET ORAL at 19:05

## 2024-05-24 RX ADMIN — ONDANSETRON 4 MG: 2 INJECTION INTRAMUSCULAR; INTRAVENOUS at 15:45

## 2024-05-24 ASSESSMENT — PAIN DESCRIPTION - ONSET: ONSET: ONGOING

## 2024-05-24 ASSESSMENT — LIFESTYLE VARIABLES
HAVE YOU EVER FELT YOU SHOULD CUT DOWN ON YOUR DRINKING: NO
TOTAL SCORE: 0
EVER FELT BAD OR GUILTY ABOUT YOUR DRINKING: NO
EVER HAD A DRINK FIRST THING IN THE MORNING TO STEADY YOUR NERVES TO GET RID OF A HANGOVER: NO
HAVE PEOPLE ANNOYED YOU BY CRITICIZING YOUR DRINKING: NO

## 2024-05-24 ASSESSMENT — PAIN SCALES - GENERAL
PAINLEVEL_OUTOF10: 7
PAINLEVEL_OUTOF10: 6

## 2024-05-24 ASSESSMENT — PAIN - FUNCTIONAL ASSESSMENT
PAIN_FUNCTIONAL_ASSESSMENT: 0-10
PAIN_FUNCTIONAL_ASSESSMENT: 0-10

## 2024-05-24 ASSESSMENT — PAIN DESCRIPTION - PROGRESSION: CLINICAL_PROGRESSION: GRADUALLY WORSENING

## 2024-05-24 ASSESSMENT — PAIN DESCRIPTION - LOCATION
LOCATION: ABDOMEN
LOCATION: ABDOMEN

## 2024-05-24 ASSESSMENT — PAIN DESCRIPTION - FREQUENCY: FREQUENCY: CONSTANT/CONTINUOUS

## 2024-05-24 ASSESSMENT — COLUMBIA-SUICIDE SEVERITY RATING SCALE - C-SSRS
2. HAVE YOU ACTUALLY HAD ANY THOUGHTS OF KILLING YOURSELF?: NO
6. HAVE YOU EVER DONE ANYTHING, STARTED TO DO ANYTHING, OR PREPARED TO DO ANYTHING TO END YOUR LIFE?: NO
1. IN THE PAST MONTH, HAVE YOU WISHED YOU WERE DEAD OR WISHED YOU COULD GO TO SLEEP AND NOT WAKE UP?: NO

## 2024-05-24 ASSESSMENT — PAIN DESCRIPTION - PAIN TYPE: TYPE: ACUTE PAIN

## 2024-05-24 ASSESSMENT — PAIN DESCRIPTION - DESCRIPTORS: DESCRIPTORS: BURNING

## 2024-05-24 NOTE — ED PROVIDER NOTES
HPI   No chief complaint on file.      HPI  Patient is a 44-year-old female with a history of morbid obesity and recent Padmini-en-Y gastric bypass who comes to the emergency department with postop vomiting.  Patient states that she is 5 weeks post Padmini-en-Y surgery and was fine until Monday.  She states that she had been started on a liquid diet and had chili 3 days in a row without realizing that it was contraindicated with her diverticulitis.  She states that on Monday she started throwing up and not being able to hold anything down, including water.  She states that she tried to eat on Tuesday, but threw that up and has not had a full meal since.  She states that she has been cold, but denies fever, chest pain, shortness of breath, diarrhea, changes in urinary habits, changes in medication.  She states that her pain is in the center of her upper abdomen and feels like it is a ball of gas.  She states that she did try some Mylanta, which did not work at all.                  Nu Coma Scale Score: 15                     Patient History   Past Medical History:   Diagnosis Date   • Anemia    • Bariatric surgery status    • Benign hypertension 10/30/2018   • Class 3 severe obesity due to excess calories without serious comorbidity with body mass index (BMI) of 50.0 to 59.9 in adult (Multi) 08/02/2023   • Encounter for pre-operative cardiovascular clearance 11/14/2023    Liam Doss MD For Bariatric Surgery   • GERD (gastroesophageal reflux disease) 08/02/2023   • Hidradenitis suppurativa    • Hypertrophy of tonsils with hypertrophy of adenoids 11/24/2014    Tonsillar and adenoid hypertrophy   • Personal history of gestational diabetes 07/13/2017    History of gestational diabetes mellitus (GDM)   • Snoring 11/10/2014    Snoring   • Thrombophlebitis 08/02/2023   • Varicose veins with pain 08/22/2023   • Vitamin B12 deficiency 08/02/2023   • Vitamin D deficiency 08/02/2023     Past Surgical History:   Procedure  Laterality Date   • BARIATRIC SURGERY  2024    LAP RYGB WITH DR MYERS @ [facility] – [place]   •  SECTION, CLASSIC  11/10/2014     Section   • GALLBLADDER SURGERY  11/10/2014    Gallbladder Surgery   • HYSTERECTOMY     • OTHER SURGICAL HISTORY  2021    Laparoscopic hysterectomy   • OTHER SURGICAL HISTORY  2021    Bilateral salpingectomy   • OTHER SURGICAL HISTORY  2021    Cystoscopy   • TONSILLECTOMY     • TUBAL LIGATION  2017    Tubal Ligation     Family History   Problem Relation Name Age of Onset   • Lung disease Mother     • Diabetes Paternal Grandmother     • Diabetes Other       Social History     Tobacco Use   • Smoking status: Former     Current packs/day: 0.00     Average packs/day: 1 pack/day for 2.0 years (2.0 ttl pk-yrs)     Types: Cigarettes     Start date:      Quit date:      Years since quittin.4     Passive exposure: Never   • Smokeless tobacco: Never   • Tobacco comments:     5/10/24:  Verified verbally [facility] LPN   Vaping Use   • Vaping status: Never Used   Substance Use Topics   • Alcohol use: Not Currently     Comment: 5/10/24: Verbalizes none since surgery hx socially, rarely  [facility] LPN   • Drug use: Yes     Types: Marijuana     Comment: 5/10/24:  Denies current [facility] LPN       Physical Exam   ED Triage Vitals [24 1520]   Temperature Heart Rate Respirations BP   36.9 °C (98.4 °F) 95 18 135/85      Pulse Ox Temp Source Heart Rate Source Patient Position   100 % Temporal Monitor Sitting      BP Location FiO2 (%)     Right arm --       Physical Exam  Vitals and nursing note reviewed.   Constitutional:       General: She is not in acute distress.     Appearance: She is well-developed. She is obese.   HENT:      Head: Normocephalic and atraumatic.   Eyes:      Conjunctiva/sclera: Conjunctivae normal.   Cardiovascular:      Rate and Rhythm: Normal rate and regular rhythm.      Heart sounds: No murmur heard.  Pulmonary:      Effort: Pulmonary effort is normal.  No respiratory distress.      Breath sounds: Normal breath sounds.   Abdominal:      General: There is no distension.      Palpations: Abdomen is soft.      Tenderness: There is abdominal tenderness in the epigastric area. There is no guarding or rebound.      Comments: 8/10 epigastric pain to palpation without rebound or guarding.     Musculoskeletal:         General: No swelling.      Cervical back: Neck supple.   Skin:     General: Skin is warm and dry.      Capillary Refill: Capillary refill takes less than 2 seconds.   Neurological:      Mental Status: She is alert and oriented to person, place, and time.   Psychiatric:         Mood and Affect: Mood normal.       ED Course & MDM   ED Course as of 05/25/24 1058   Sat May 25, 2024   0007 CT abdomen pelvis w IV contrast  Findings c/f postoperative gastritis, also noted sigmoid diverticulosis without diverticulitis. No evidence of perforation or anastomotic leak. Pt is not peritonitic on exam and nausea well-controlled with zofran. [HH]      ED Course User Index  [HH] Etelvina Wilkinson MD         Diagnoses as of 05/25/24 1058   Postoperative vomiting       Medical Decision Making  Patient is a 44-year-old female with a history of morbid obesity and recent Padmini-en-Y gastric bypass who comes to the emergency department with postop vomiting.  A CT abdomen pelvis with contrast was ordered.  Patient's vitals were stable and within normal limits, including being afebrile.  Patient given Zofran and 1 L lactated ringer bolus.  Patient's glucose was 107 with normal lactate and magnesium.  Patient CBC showed no evidence of leukocytosis with a WBC of 7.2 and no evidence of anemia with hemoglobin of 12.4.  Patient CMP was unremarkable with a potassium slightly low at 3.3.  Patient's lipase was 22 making pancreatitis unlikely.  Patient's EKG showed normal sinus rhythm at a rate of 90 bpm, normal axis, normal intervals, and no ST elevations.  Patient given some Protonix for GERD  symptoms.  At time of signout at 2300 hrs., patient care transferred to Dr. Wilkinson with patient in good condition awaiting CT abdomen pelvis and final disposition.    Procedure  Procedures none     Anthony Peoples DO  Resident  05/24/24 2077       Anthony Peoples DO  Resident  05/25/24 1042

## 2024-05-24 NOTE — ED TRIAGE NOTES
Patient to ED with complaints of nausea/ vomiting since Monday. 5 weeks postop gastric bypass. Also has PMH diverticulitis. Reports she hasn't been able to eat or take fluids since Monday. Patient believes is her diverticulitis, but surgeon recommended she come in for labs and fluids. Also epigastric pain and burning.

## 2024-05-25 VITALS
OXYGEN SATURATION: 97 % | RESPIRATION RATE: 20 BRPM | BODY MASS INDEX: 43.16 KG/M2 | HEART RATE: 73 BPM | WEIGHT: 275 LBS | HEIGHT: 67 IN | DIASTOLIC BLOOD PRESSURE: 72 MMHG | SYSTOLIC BLOOD PRESSURE: 136 MMHG | TEMPERATURE: 98.2 F

## 2024-05-25 LAB
ATRIAL RATE: 90 BPM
P AXIS: -23 DEGREES
P OFFSET: 196 MS
P ONSET: 146 MS
PR INTERVAL: 148 MS
Q ONSET: 220 MS
QRS COUNT: 15 BEATS
QRS DURATION: 74 MS
QT INTERVAL: 330 MS
QTC CALCULATION(BAZETT): 403 MS
QTC FREDERICIA: 377 MS
R AXIS: 23 DEGREES
T AXIS: 10 DEGREES
T OFFSET: 385 MS
VENTRICULAR RATE: 90 BPM

## 2024-05-25 RX ORDER — ONDANSETRON 4 MG/1
4 TABLET, ORALLY DISINTEGRATING ORAL EVERY 6 HOURS PRN
Qty: 15 TABLET | Refills: 0 | Status: SHIPPED | OUTPATIENT
Start: 2024-05-25 | End: 2024-06-04

## 2024-05-25 RX ORDER — ACETAMINOPHEN 325 MG/1
1000 TABLET ORAL EVERY 8 HOURS PRN
Qty: 30 TABLET | Refills: 0 | Status: SHIPPED | OUTPATIENT
Start: 2024-05-25 | End: 2024-06-04

## 2024-05-25 NOTE — DISCHARGE INSTRUCTIONS
You were seen in the emergency department today for nausea and vomiting. Your labs and imaging did not show any concerning findings today and you will be discharged home.  A prescription for zofran and a refill of your omeprazole have been sent to your pharmacy.  Please use this medication as instructed by your pharmacist. You should follow-up with your primary care provider within one week, or sooner if symptoms do not improve.  Please return to the emergency department if you begin experiencing any fevers/chills, abdominal pain, worsening nausea/vomiting, or if you are unable to keep down food or fluids.    Your imaging/labs showed an incidental finding of arthritis changes to the R hip.  Please follow-up on these findings with your primary care provider to determine if further imaging/labs are needed.

## 2024-05-25 NOTE — PROGRESS NOTES
I received Shoneia M Collins in signout from Dr. Peoples.  Please see the previous note for all HPI, PE and MDM up to the time of signout at 2300.  This is in addition to the primary documentation.    In brief Shoneia M Collins is an 44 y.o. female s/p Padmini-en-Y gastric bypass 4/22/24 who presented to ED for nausea/vomiting and inability to tolerate PO. Prior to signout, patient's nausea had been treated with zofran and she was given IV protonix, tylenol, and 1L LR bolus.    Chief Complaint   Patient presents with    Vomiting        At the time of signout we were awaiting: CT AP and reassessment    MDM:  ED Course as of 05/25/24 0340   Sat May 25, 2024   0007 CT abdomen pelvis w IV contrast  Findings c/f postoperative gastritis, also noted sigmoid diverticulosis without diverticulitis. No evidence of perforation or anastomotic leak. Pt is not peritonitic on exam and nausea well-controlled with zofran. [HH]      ED Course User Index  [HH] Etelvina Wilkinson MD         Diagnoses as of 05/25/24 0340   Postoperative vomiting     No leukocytosis, so lower concern for infectious causes of her nausea/vomiting.  CMP without significant electrolyte disturbances to explain patient's symptoms.  Mild hypokalemia is less likely as a cause for her nausea/vomiting and is more likely a result of her GI losses.     Patient remained hemodynamically stable throughout her time in the emergency department.  On my assessment of the patient, her abdomen is soft and nontender and her nausea was well-controlled after zofran, protonix, and LR bolus. She was able to tolerate PO intake. Discussed results of CT AP with patient who expressed understanding. Offered zofran and tylenol prescriptions and these were sent to patient's pharmacy.  Counseled on strict return precautions including fever/chills, lightheadedness, worsening nausea/vomiting, inability to tolerate food or fluids, worsening abdominal pain, or any new concerns.      Assessment and  plan discussed with Dr. Hill    Disposition: Discharged home    Etelvina Wilkinson MD   Emergency Medicine, PGY-1

## 2024-06-04 ENCOUNTER — TELEPHONE (OUTPATIENT)
Dept: SURGERY | Facility: CLINIC | Age: 44
End: 2024-06-04
Payer: COMMERCIAL

## 2024-06-04 NOTE — TELEPHONE ENCOUNTER
Thank you for speaking with me earlier today & confirming your scheduled visit with Dr. Benito on 6/7/24 9:30 AM at [facility] [address] ([address]) [place] [address] (inside [facility], main floor in the [facility]).   Parking is available at a cost of $5.00 at the Main Entrance.  We look forward to seeing you!  Joyce Haynes, [facility] Nurse.

## 2024-06-04 NOTE — PROGRESS NOTES
Follow Up Bariatric Nutrition Assessment    Name: Shoneia M Collins  MRN: [id number]  Date: 06/04/24     Surgery Date:  4/22/24  Surgeon:  Thong  Procedure:  RNYGB    ASSESSMENT:  Current weight:  n/a  Previous weight:   329lbs  Initial start weight:  339lbs  EBW: 180lbs    PROGRESS:  Nutrition Interventions for last encounter   Continue to drink your protein shakes to meet your goal of 60-70 g of protein per day. Begin measuring how much protein you can eat on the soft diet so that you know when to start weaning off of your protein shakes.   Continue to drink 64 oz. of zero calorie beverages per day  Continue no drinking 30 min before, during the meal and for 30 minutes after the meal  Continue to exercise  Advance to the puree diet for 1 week, then soft food for 3 weeks  Remember to eat slowly and chew thoroughly  Try one new food at a time to test for any intolerances.   Continue to take all of your vitamins and minerals.     CHANGES IN TREATMENT:   Patient met goals:  Partially     24 hour food recall:   Breakfast:  greek yogurt (15g) and [employer] spinach feta wrap (without wrap)  Snack: humus with veggies   Lunch: chicken noodle soup or chicken salad with [employer] chips   Snack:     Dinner:  turkey jeramie steak (2oz) w/packet gravy w/green beans or cod with veggies   Snack:   Beverages: water   Alcohol: no       Vitamins:  Bariatric pal MVI w/B12 1x per day,  1200-1500mg total Calcium citrate   Medications:   Current Outpatient Medications:     acetaminophen (Tylenol) 325 mg tablet, Take 3 tablets (975 mg) by mouth every 8 hours if needed for mild pain (1 - 3) for up to 10 days., Disp: 30 tablet, Rfl: 0    calcium citrate (Calcitrate) 200 mg (950 mg) tablet, Take 1 tablet (200 mg) by mouth once daily., Disp: 30 tablet, Rfl: 11    clindamycin (Cleocin T) 1 % lotion, Apply topically if needed., Disp: , Rfl:     dicyclomine (Bentyl) 10 mg capsule, Take 1 capsule (10 mg) by mouth 4 times a day before meals. Take  with meals and at bedtime, Disp: 120 capsule, Rfl: 11    ferrous sulfate, 325 mg ferrous sulfate, tablet, Take 1 tablet by mouth once daily., Disp: 90 tablet, Rfl: 3    Fiber Therapy, m-cellulose, 500 mg tablet, , Disp: , Rfl:     fluticasone (Flonase) 50 mcg/actuation nasal spray, Administer into affected nostril(s) if needed., Disp: , Rfl:     ketoconazole (NIZOral) 2 % cream, Apply topically if needed., Disp: , Rfl:     loratadine (Claritin Reditabs) 10 mg disintegrating tablet, Take 1 tablet (10 mg) by mouth once daily., Disp: , Rfl:     multivitamin tablet, Take 1 tablet by mouth once daily., Disp: 90 tablet, Rfl: 0    omeprazole (PriLOSEC) 20 mg DR capsule, TAKE ONE (1) CAPSULE (20 MG) BY MOUTH TWO (2) TIMES A DAY BEFORE MEALS. DO NOT CRUSH OR CHEW., Disp: 28 capsule, Rfl: 0    omeprazole (PriLOSEC) 40 mg DR capsule, Take 1 capsule (40 mg) by mouth once daily in the morning. Take before meals. Do not crush or chew. Open capsule, sprinkle beads on SF jello, pudding or applesauce., Disp: 30 capsule, Rfl: 5    tiZANidine (Zanaflex) 4 mg capsule, Take 1 capsule (4 mg) by mouth once daily at bedtime., Disp: 30 capsule, Rfl: 11    Physical Activity: limited d/t hip pain and having time, water aerobics 2x per week     READINESS TO LEARN:  Motivation to learn:  Interested      Understanding of instruction: Good      Anticipated Compliance: Good      Family Support: Unable to assess-family not present     5/25/24: ED visit for post op emesis r/t diverticulitis triggered by chili. Has been feeling better. Working on finding a balance between foods she can tolerate after BS and with diverticulitis.     Patient presents with post-op weight loss surgery gastric bypass. The pt is 6 weeks postop.   Tolerating soft diet without difficulty.  Protein intake is not adequate for post-op individual. Pt needs to begin tracking to be sure that she is meeting her goals. Fluid consumption is not adequate. Patient is supplementing  recommended vitamin/minerals. Pt states no concerns/difficulties.  Discussed the transition diet. Reminded pt to eat slowly, chew thoroughly and to try on new food at a time.     Malnutrition Screening  Significant unintentional weight loss? n/a  Eating less than 75% of usual intake for more than 2 weeks? n/a    Nutrition Diagnosis:   Increased protein and nutrition needs related to altered GI function as evidenced by pt. s/p gastric bypass.  Food- and nutrition-related knowledge deficit related to lack of prior exposure to surgical weight loss information as evidenced by diet recall.     Nutrition Interventions:   Modify type and amount of food and nutrients within meals and snacks.  Comprehensive Nutrition Education  -Nutrition education materials: none      Recommendations:    Eat 60-70 g of protein per day. Track everything that you are having to be sure you are getting enough.   Drink 64 oz. of zero calorie beverages per day. Set more frequent fluids goals for yourself. Consider getting a tracking water bottle.  Continue no drinking 30 min before, during the meal and for 30 minutes after the meal  Increase intensity and duration of exercise  Advance to transition diet. Try raw veggies, fresh fruit and lean ground beef.   Eat slowly, chew thoroughly  Try one new food at a time.   Continue current vit/min regimen    Nutrition Monitoring and Evaluation:   1-2 pounds weight loss per week  Criteria: weight check, food recall  Need for Follow-up: 3 months    Leela Chatterjee MS, RD, LD  Phone: [phone]

## 2024-06-05 ENCOUNTER — NUTRITION (OUTPATIENT)
Dept: SURGERY | Facility: CLINIC | Age: 44
End: 2024-06-05
Payer: COMMERCIAL

## 2024-06-05 VITALS — BODY MASS INDEX: 43.07 KG/M2 | HEIGHT: 67 IN

## 2024-06-07 ENCOUNTER — OFFICE VISIT (OUTPATIENT)
Dept: SURGERY | Facility: CLINIC | Age: 44
End: 2024-06-07
Payer: COMMERCIAL

## 2024-06-07 VITALS
RESPIRATION RATE: 18 BRPM | BODY MASS INDEX: 44.89 KG/M2 | OXYGEN SATURATION: 97 % | SYSTOLIC BLOOD PRESSURE: 124 MMHG | WEIGHT: 286 LBS | HEART RATE: 82 BPM | HEIGHT: 67 IN | DIASTOLIC BLOOD PRESSURE: 79 MMHG

## 2024-06-07 DIAGNOSIS — M16.0 PRIMARY OSTEOARTHRITIS OF BOTH HIPS: ICD-10-CM

## 2024-06-07 DIAGNOSIS — K91.2 INTESTINAL MALABSORPTION FOLLOWING GASTRECTOMY (HHS-HCC): ICD-10-CM

## 2024-06-07 DIAGNOSIS — Z98.84 S/P GASTRIC BYPASS: ICD-10-CM

## 2024-06-07 DIAGNOSIS — I10 BENIGN HYPERTENSION: Primary | ICD-10-CM

## 2024-06-07 DIAGNOSIS — Z90.3 INTESTINAL MALABSORPTION FOLLOWING GASTRECTOMY (HHS-HCC): ICD-10-CM

## 2024-06-07 DIAGNOSIS — E66.01 MORBID OBESITY DUE TO EXCESS CALORIES (MULTI): ICD-10-CM

## 2024-06-07 PROCEDURE — 3078F DIAST BP <80 MM HG: CPT | Performed by: SURGERY

## 2024-06-07 PROCEDURE — 3008F BODY MASS INDEX DOCD: CPT | Performed by: SURGERY

## 2024-06-07 PROCEDURE — 99024 POSTOP FOLLOW-UP VISIT: CPT | Performed by: SURGERY

## 2024-06-07 PROCEDURE — 3074F SYST BP LT 130 MM HG: CPT | Performed by: SURGERY

## 2024-06-07 PROCEDURE — 1036F TOBACCO NON-USER: CPT | Performed by: SURGERY

## 2024-06-07 ASSESSMENT — PAIN SCALES - GENERAL: PAINLEVEL: 8

## 2024-06-07 NOTE — PROGRESS NOTES
BARIATRIC SURGERY CLINIC  FOLLOW UP NOTE      Name: Shoneia M Collins  MRN: [id number]      Index Surgery  Date of Surgery: 4/22/24   Surgeon: Luis Armando Benito MD  Surgical Procedure: Laparoscopic bakari en y gastric bypass 43243  Initial weight: 339 lbs  Pre-surgical weight: 322 lbs      Other Bariatric Surgeries  none    Visit: 6    weeks  Today's Visit:   Wt Readings from Last 1 Encounters:   06/07/24 130 kg (286 lb)    Body mass index is 44.79 kg/m².   Last Visit:    Wt Readings from Last 3 Encounters:   06/07/24 130 kg (286 lb)   05/24/24 125 kg (275 lb)   05/13/24 132 kg (290 lb)       Total weight loss: 36 lbs    HPI: did have an ER visit 2 weeks ago with abdominal pain (episode of her diverticulitis) and poor po intake due to nausea and dry heaves. CT showed some inflammation around the gastric pouch. She was hydrated and discharged home.    Her HS is getting worse under her abdominal skin fold.     DIET INTAKE: Regular food    Diet History:     Fluid intake: not 64 oz/day  She is trying to eat 3 meals a day.  She is struggling to balance eating 3 meals a day with drinking enough fluids.   Snacks: sometimes - discreet snacking, quest protein chips    DAILY SUPPLEMENTS:  Calcium: Calcium Citrate w/ vitamin D (1200 - 1500mg)  Multivitamin & Minerals: 1 per day - bariatric combo vitamin  Iron Supplement: included in multi-vitamin  Vitamin B12: included in multi-vitamin  Vitamin D3: included in multi-vitamin  Other: none  PPI:omeprazole    EXERCISE: she does water aerobics once a week. She cannot walk and has limited lower extremity exercise due to her hip pain. Her ortho doc says she cannot walk. He has told her he won't do hip replacement until her BMI is 38.    Symptoms:      Nausea/vomiting: denies   Food intolerance: denies   Constipation: denies   Diarrhea: denies   Experiencing dumping: denies   Abdominal pain: denies   Reflux: denies Are you on medications: omeprazole      Current Outpatient Medications    Medication Sig Dispense Refill    calcium citrate (Calcitrate) 200 mg (950 mg) tablet Take 1 tablet (200 mg) by mouth once daily. 30 tablet 11    clindamycin (Cleocin T) 1 % lotion Apply topically if needed.      ferrous sulfate, 325 mg ferrous sulfate, tablet Take 1 tablet by mouth once daily. 90 tablet 3    fluticasone (Flonase) 50 mcg/actuation nasal spray Administer into affected nostril(s) if needed.      ketoconazole (NIZOral) 2 % cream Apply topically if needed.      multivitamin tablet Take 1 tablet by mouth once daily. 90 tablet 0    omeprazole (PriLOSEC) 40 mg DR capsule Take 1 capsule (40 mg) by mouth once daily in the morning. Take before meals. Do not crush or chew. Open capsule, sprinkle beads on SF jello, pudding or applesauce. 30 capsule 5    tiZANidine (Zanaflex) 4 mg capsule Take 1 capsule (4 mg) by mouth once daily at bedtime. 30 capsule 11    dicyclomine (Bentyl) 10 mg capsule Take 1 capsule (10 mg) by mouth 4 times a day before meals. Take with meals and at bedtime (Patient not taking: Reported on 6/7/2024) 120 capsule 11    Fiber Therapy, m-cellulose, 500 mg tablet       loratadine (Claritin Reditabs) 10 mg disintegrating tablet Take 1 tablet (10 mg) by mouth once daily.       No current facility-administered medications for this visit.       Comorbidities:  Benign hypertension  No meds        REVIEW OF SYSTEMS:  CONSTITUTIONAL: Patient denies fevers, chills, sweats and weight changes.  EYES: Patient denies any visual symptoms.  EARS, NOSE, AND THROAT: No difficulties with hearing. No symptoms of rhinitis or sore throat.  CARDIOVASCULAR: Patient denies chest pains, palpitations, orthopnea and paroxysmal nocturnal dyspnea.  RESPIRATORY: No dyspnea on exertion, no wheezing or cough.  GI: No nausea, vomiting, diarrhea, constipation, abdominal pain, hematochezia or melena.  : No urinary hesitancy or dribbling. No nocturia or urinary frequency. No abnormal urethral discharge.  MUSCULOSKELETAL:  "No myalgias or arthralgias.  NEUROLOGIC: No chronic headaches, no seizures. Patient denies numbness, tingling or weakness.  PSYCHIATRIC: Patient denies problems with mood disturbance. No problems with anxiety.  ENDOCRINE: No excessive urination or excessive thirst.  DERMATOLOGIC: Patient denies any rashes or skin changes.    PHYSICAL EXAM:  /79   Pulse 82   Resp 18   Ht 1.702 m (5' 7\")   Wt 130 kg (286 lb)   LMP 10/07/2021   SpO2 97%   BMI 44.79 kg/m²   GENERAL: Obese. No apparent distress. Pt is alert and oriented x3.  HEENT: Head is normocephalic and atraumatic. Extraocular muscles are intact. Pupils are equal, round, and reactive to light and accommodation. Nares appeared normal. Mouth is well hydrated and without lesions. Mucous membranes are moist. Posterior pharynx clear of any exudate or lesions.  NECK: Supple. No carotid bruits. No lymphadenopathy or thyromegaly.  LUNGS: Clear to auscultation.  HEART: Regular rate and rhythm without murmur.  ABDOMEN: Soft, nontender, and nondistended. Positive bowel sounds. No hepatosplenomegaly was noted.  EXTREMITIES: Without any cyanosis, clubbing, rash, lesions or edema.  NEUROLOGIC: Cranial nerves II through XII are grossly intact.  PSYCHIATRIC: Flat affect, but denies suicidal or homicidal ideations.  SKIN: No ulceration or induration present.      A/P: Normal post-OP course    No sign of infection, Doing well, tolerating diet, and Comorbidities improved: GERD, HTN    Energy: Energy good    Weight loss: Steady    Recommendations: Fluid intake 60 oz/day, Protein 60 g/day, counseled on diet: eat an activia yogurt as a snack, keep working on getting adequate PO fluids , Plan your meals, Do not skip meals, Continue taking vitamins as directed., and 3 month labs    Can wean off the omeprazole at 3 month appointment.    Exercise more: try more pool exercise and upper body strengthening exercises.      Follow up: 6 weeks    Luis Armando Benito MD  Bariatric and " Minimally Invasive General Surgery

## 2024-06-25 ENCOUNTER — APPOINTMENT (OUTPATIENT)
Dept: PHYSICAL THERAPY | Facility: CLINIC | Age: 44
End: 2024-06-25
Payer: COMMERCIAL

## 2024-06-26 DIAGNOSIS — Z98.84 BARIATRIC SURGERY STATUS: ICD-10-CM

## 2024-06-26 DIAGNOSIS — E66.01 CLASS 3 SEVERE OBESITY DUE TO EXCESS CALORIES WITHOUT SERIOUS COMORBIDITY WITH BODY MASS INDEX (BMI) OF 50.0 TO 59.9 IN ADULT (MULTI): ICD-10-CM

## 2024-06-26 RX ORDER — ANTISEPTIC SURGICAL SCRUB 0.04 MG/ML
SOLUTION TOPICAL
Qty: 237 ML | Refills: 10 | OUTPATIENT
Start: 2024-06-26

## 2024-06-27 RX ORDER — MULTIVITAMIN
1 TABLET ORAL DAILY
Qty: 90 TABLET | Refills: 0 | Status: SHIPPED | OUTPATIENT
Start: 2024-06-27

## 2024-07-02 ENCOUNTER — APPOINTMENT (OUTPATIENT)
Dept: PHYSICAL THERAPY | Facility: CLINIC | Age: 44
End: 2024-07-02
Payer: COMMERCIAL

## 2024-07-03 ENCOUNTER — APPOINTMENT (OUTPATIENT)
Dept: HEMATOLOGY/ONCOLOGY | Facility: CLINIC | Age: 44
End: 2024-07-03
Payer: COMMERCIAL

## 2024-07-08 NOTE — PROGRESS NOTES
"Follow Up Bariatric Nutrition Assessment                  Name: Shoneia M Collins  MRN: [id number]  Date: 07/17/24    Surgery Date:  4/22/24  Surgeon:  Thong  Procedure:  RNYGB    ASSESSMENT:  Current weight:      Vitals:    07/17/24 1300   Weight: 125 kg (276 lb)                 Ht: 1.702 m (5' 7\")  BMI:  Body mass index is 43.23 kg/m².  Previous weight:   329lbs  Initial start weight:  339lbs  EBW: 180lbs  Total weight change:  63lbs  %EBW Lost: 35%    PROGRESS:  Nutrition Interventions for last encounter    Eat 60-70 g of protein per day. Track everything that you are having to be sure you are getting enough.   Drink 64 oz. of zero calorie beverages per day. Set more frequent fluids goals for yourself. Consider getting a tracking water bottle.  Continue no drinking 30 min before, during the meal and for 30 minutes after the meal  Increase intensity and duration of exercise  Advance to transition diet. Try raw veggies, fresh fruit and lean ground beef.   Eat slowly, chew thoroughly  Try one new food at a time.   Continue current vit/min regimen    CHANGES IN TREATMENT:   Patient met goals: partially    24 hour food recall:   Breakfast: egg bites (12g)  Snack: quest chips   Lunch: leftovers  Snack:    Dinner:  steak/chicken (3oz) w/veggies  Snack: SF cookies/ice cream  Beverages: water   Alcohol: no       Vitamins:  Bariatric pal MVI w/B12 1x per day or MVI 2x and B12,  1200-1500mg total Calcium citrate, biotin   Medications:   Current Outpatient Medications:     calcium citrate (Calcitrate) 200 mg (950 mg) tablet, Take 1 tablet (200 mg) by mouth once daily., Disp: 30 tablet, Rfl: 11    clindamycin (Cleocin T) 1 % lotion, Apply topically if needed., Disp: , Rfl:     dicyclomine (Bentyl) 10 mg capsule, Take 1 capsule (10 mg) by mouth 4 times a day before meals. Take with meals and at bedtime (Patient not taking: Reported on 6/7/2024), Disp: 120 capsule, Rfl: 11    ferrous sulfate, 325 mg ferrous sulfate, tablet, Take " 1 tablet by mouth once daily., Disp: 90 tablet, Rfl: 3    Fiber Therapy, m-cellulose, 500 mg tablet, , Disp: , Rfl:     fluticasone (Flonase) 50 mcg/actuation nasal spray, Administer into affected nostril(s) if needed., Disp: , Rfl:     ketoconazole (NIZOral) 2 % cream, Apply topically if needed., Disp: , Rfl:     loratadine (Claritin Reditabs) 10 mg disintegrating tablet, Take 1 tablet (10 mg) by mouth once daily., Disp: , Rfl:     multivitamin tablet, TAKE ONE (1) TABLET BY MOUTH ONCE DAILY., Disp: 90 tablet, Rfl: 0    omeprazole (PriLOSEC) 40 mg DR capsule, Take 1 capsule (40 mg) by mouth once daily in the morning. Take before meals. Do not crush or chew. Open capsule, sprinkle beads on SF jello, pudding or applesauce., Disp: 30 capsule, Rfl: 5    tiZANidine (Zanaflex) 4 mg capsule, Take 1 capsule (4 mg) by mouth once daily at bedtime., Disp: 30 capsule, Rfl: 11  Physical Activity: elliptical/row machine for 45 minutes daily     READINESS TO LEARN:  Motivation to learn:   Interested      Understanding of instruction: Good    Anticipated Compliance: Good      Family Support: Unable to assess-family not present     Patient presents with post-op weight loss surgery gastric bypass. Pt is 3 months post op.   Patient has lost 63 pounds since initial assessment accounting for 35% loss excess body weight.  Tolerating regular diet without difficulty. Notices that she has been having an increased sweet tooth which has been causing her to snack more at night.  Protein intake is not always adequate for post-op individual. With plans to take a 20g protein shake in the morning before her workout. Fluid consumption is adequate. Patient is supplementing recommended vitamin/minerals. Pt states no concerns/difficulties.    Malnutrition Screening  Significant unintentional weight loss? n/a  Eating less than 75% of usual intake for more than 2 weeks? n/a     Nutrition Diagnosis:   Increased protein and nutrition needs related to  altered GI function as evidenced by pt. s/p gastric bypass.  Food- and nutrition-related knowledge deficit related to lack of prior exposure to surgical weight loss information as evidenced by diet recall.     Nutrition Interventions:   Modify type and amount of food and nutrients within meals and snacks.  Comprehensive Nutrition Education  -Nutrition education materials:         Recommendations:    Eat 60-70 g of protein per day. Start using your 20g protein drink in the morning.   Limit special snacks to 1-2x per week.   Drink 64 oz. of zero calorie beverages per day  Continue no drinking 30 min before, during the meal and for 30 minutes after the meal  Increase intensity and duration of exercise  Get your labs taken.     Nutrition Monitoring and Evaluation:   1-2 pounds weight loss per week  Criteria: weight check, food recall  Need for Follow-up: 6 months post op    Leela Chatterjee MS, RD, LD  Phone: [phone]

## 2024-07-09 ENCOUNTER — APPOINTMENT (OUTPATIENT)
Dept: HEMATOLOGY/ONCOLOGY | Facility: CLINIC | Age: 44
End: 2024-07-09
Payer: COMMERCIAL

## 2024-07-15 NOTE — PATIENT INSTRUCTIONS
"The following are the recommendations we discussed at your appointment today:  1. Nutrition  - Please make sure you are seeing the bariatric dietitian regularly.    Dietitian Information:   Rody Martinez: [phone]  [facility] [phone]    Your Protein Goals will gradually increase as you get further out from surgery:  0-3 months - 60 GRAMS PER DAY  3-6 months - 60-75 GRAMS PER DAY  6-12 months - 75-90 GRAMS PER DAY  12+ months - 80-90 GRAMS PER DAY    - Follow Pouch Rules;.   Stop drinking 30 minutes before your meals, Take 30 minutes to eat your meal   - NO DRINKING DURING MEALS, Wait for 30 minutes after your meal to drink  - Eat 5 servings of fruits and veggies daily.  A serving is 1 small (tennis ball size) piece of whole fruit, 1/2 cup fresh fruit, 1 cup non starchy vegetables  - Eat 3 small meals and 1-2 healthy, protein rich, snacks per day.    EAT PROTEIN FIRST AT MEALS, 2nd non starchy vegetable or fruit serving, consume starches last and aim for whole grains of small portion.  This pattern of eating ensures you are getting full on high quality protein and higher fiber foods with many vitamins and minerals to support adequate nutrition first.      2. Exercise Recommendations:    Regular physical activity is CRITICAL for long term successful weight management.    Strive for a minimum weekly exercise goal of at least 200 minutes per week of aerobic exercise (45 minutes at least 5 days per week or 30 minutes daily)    Strength based resistance training is critical for helping to maintain and build lean body mass/muscle mass which is very important for long term health.  Having higher muscle mass is associated with better health outcomes and can help to maintain higher calorie expenditure.      Designing a Strength Program:  Select 3-4 exercises that target different major muscle groups.  - Emphasize the following movements \"pull, push, squat, hip hinge\"  \"Pull\" examples - pull up, " "bent over row or dumbbell row, pull down with weight bar or resistance band  \"Push\" examples - push up, bench press, chest flys, dips, overhead press, dumbbell bench press  \"Squat\" examples - air squat, chair squat, lunge steps, goblet squat, front squat, etc  \"Hip hinge\" examples - kettle bell swing, good morning, glute bridge, deadlift, suitcase pick ups, hip thrust    Perform two to three sets of eight to 15 repetitions of each exercise.  Try to use a resistance that feels like an \"8 out of 10\" effort, with 10 being the highest effort you can give.    To get stronger, try increasing either the weight, number of repetitions, number of sets or number of exercises every 4-8 weeks as you feel more comfortable with your current program.      3. Fluids  - Drink at least 60 oz of water every day.   - Wait 30 minutes before or after meals to drink fluids.  - Avoid carbonated beverages.    4. Vitamins  - Remember to take your multivitamins 2 times daily, once in the morning and once in the evening  - Take your calcium 2-3 times daily, at least 2 hours apart from the multivitamin - total daily dose at least 1200-1500mg per day.    - Vitamin D3 3000 units per day  - B12 - depending on your blood work and how well you absorb B12 from your multivitamin you may need additional B12 of 350-500mcg oral per day.    5. Labs  - We will check labs today and results will be communicated via [facility] My Chart  - A copy of the results can be viewed on  My Chart.      Follow-up with Dietitian for bariatric diet optimization  Follow-up with PCP for general health questions and ongoing management of routine health concerns      "

## 2024-07-15 NOTE — PROGRESS NOTES
BARIATRIC SURGERY CLINIC  Comprehensive Weight Management          Name: Shoneia M Collins  MRN: [id number]     Index Surgery  Date of Surgery: 4/22/24   Surgeon: Luis Armando Benito MD  Surgical Procedure: Laparoscopic bakari en y gastric bypass 06291    Virtual or Telephone Consent:  A telephone visit (audio or visual only) between the patient (at the originating site) and the provider (at the distant site) was utilized to provide this telehealth service. Verbal consent was requested and obtained from Shoneia M Collins on this date, 07/16/24 for a bariatric telehealth visit.     HPI   Shoneia M Collins is a 44 y.o. female presenting for 3 mo pov s/p RYGB.     Diet:  - Stage: regular food diet  - Achieving protein and fluid goals most days: yes     Exercise:  - walking, increasing activity as tolerated.    Concerns related to:  Nausea/Vomiting, Reflux: denies  Abdominal Pain: denies  Diarrhea/Constipation- bowel function is regular    Daily Supplements:  Calcium: Calcium Citrate w/ vitamin D (1200 - 1500mg)  Multivitamin & Minerals: 2 per day  Vitamin B12: 500 mcg/day   Vitamin D3: 3000 units   Iron/Other: iron supplement   PPI: taking    Primary Medical Hx:  Patient Active Problem List   Diagnosis    Anemia    Anosmia    Anovular menstruation    Arthritis    Benign hypertension    Candidal vulvovaginitis    Cervical lymphadenopathy    Snoring    Dysmenorrhea    Fatigue    Hidradenitis suppurativa    Class 3 severe obesity due to excess calories without serious comorbidity with body mass index (BMI) of 50.0 to 59.9 in adult (Multi)    Mass of breast, right    Menorrhagia    Primary osteoarthritis of both hips    Polyphagia    Sleep apnea    Tinea corporis    Varicose veins of leg with edema, bilateral    Vitamin B12 deficiency    Vitamin D deficiency    Thrombophlebitis    S/P GAVIN (total abdominal hysterectomy)    Left lower quadrant abdominal pain    Back pain    Hemorrhage of varicose veins of left lower extremity     Hyperglycemia    Chronic left shoulder pain    Lingual tonsil hypertrophy    Smell and taste disorder    Subacromial bursitis of left shoulder joint    Varicose veins with pain    Inadequate caloric intake    Inadequate dietary caloric intake    S/P gastric bypass    Umbilical hernia without obstruction and without gangrene    Use of cannabis    Eating disorder in remission    Reaction to chronic stress    History of marijuana use    Cannabis use, unspecified, in remission    Generalized abdominal pain    Localized swelling of left lower leg    Pain of right hip joint    Strain of muscle of hip    Morbid obesity (Multi)    Morbid obesity due to excess calories (Multi)    Intestinal malabsorption following gastrectomy ([facility])      Past Surgical History:   Procedure Laterality Date    BARIATRIC SURGERY  2024    LAP RYGB WITH DR MYERS @ [facility]     SECTION, CLASSIC  11/10/2014     Section    GALLBLADDER SURGERY  11/10/2014    Gallbladder Surgery    HYSTERECTOMY      OTHER SURGICAL HISTORY  2021    Laparoscopic hysterectomy    OTHER SURGICAL HISTORY  2021    Bilateral salpingectomy    OTHER SURGICAL HISTORY  2021    Cystoscopy    TONSILLECTOMY      TUBAL LIGATION  2017    Tubal Ligation      Social History     Socioeconomic History    Marital status: Single     Spouse name: Not on file    Number of children: Not on file    Years of education: Not on file    Highest education level: Not on file   Occupational History    Not on file   Tobacco Use    Smoking status: Former     Current packs/day: 0.00     Average packs/day: 1 pack/day for 2.0 years (2.0 ttl pk-yrs)     Types: Cigarettes     Start date:      Quit date:      Years since quittin.5     Passive exposure: Never    Smokeless tobacco: Never    Tobacco comments:     24:  Verified verbally [facility] LPN   Vaping Use    Vaping status: Never Used   Substance and Sexual Activity    Alcohol use: Not Currently      Comment: 6/4/24: Verbalizes none since surgery hx socially, rarely  [facility] LPN    Drug use: Yes     Types: Marijuana     Comment: 6/4/24:  Denies current [facility] LPN    Sexual activity: Yes     Partners: Male     Birth control/protection: Female Sterilization, Condom Male   Other Topics Concern    Not on file   Social History Narrative    Not on file     Social Determinants of Health     Financial Resource Strain: Low Risk  (4/23/2024)    Overall Financial Resource Strain (CARDIA)     Difficulty of Paying Living Expenses: Not hard at all   Food Insecurity: No Food Insecurity (11/17/2023)    Hunger Vital Sign     Worried About Running Out of Food in the Last Year: Never true     Ran Out of Food in the Last Year: Never true   Transportation Needs: No Transportation Needs (4/23/2024)    PRAPARE - Transportation     Lack of Transportation (Medical): No     Lack of Transportation (Non-Medical): No   Physical Activity: Not on file   Stress: Not on file   Social Connections: Not on file   Intimate Partner Violence: Not At Risk (10/6/2023)    Humiliation, Afraid, Rape, and Kick questionnaire     Fear of Current or Ex-Partner: No     Emotionally Abused: No     Physically Abused: No     Sexually Abused: No   Housing Stability: Low Risk  (4/23/2024)    Housing Stability Vital Sign     Unable to Pay for Housing in the Last Year: No     Number of Places Lived in the Last Year: 1     Unstable Housing in the Last Year: No     Family History   Problem Relation Name Age of Onset    Lung disease Mother      Diabetes Paternal Grandmother      Diabetes Other        Current Outpatient Medications on File Prior to Visit   Medication Sig Dispense Refill    calcium citrate (Calcitrate) 200 mg (950 mg) tablet Take 1 tablet (200 mg) by mouth once daily. 30 tablet 11    clindamycin (Cleocin T) 1 % lotion Apply topically if needed.      dicyclomine (Bentyl) 10 mg capsule Take 1 capsule (10 mg) by mouth 4 times a day before meals. Take with meals and  "at bedtime (Patient not taking: Reported on 6/7/2024) 120 capsule 11    ferrous sulfate, 325 mg ferrous sulfate, tablet Take 1 tablet by mouth once daily. 90 tablet 3    Fiber Therapy, m-cellulose, 500 mg tablet       fluticasone (Flonase) 50 mcg/actuation nasal spray Administer into affected nostril(s) if needed.      ketoconazole (NIZOral) 2 % cream Apply topically if needed.      loratadine (Claritin Reditabs) 10 mg disintegrating tablet Take 1 tablet (10 mg) by mouth once daily.      multivitamin tablet TAKE ONE (1) TABLET BY MOUTH ONCE DAILY. 90 tablet 0    tiZANidine (Zanaflex) 4 mg capsule Take 1 capsule (4 mg) by mouth once daily at bedtime. 30 capsule 11    [DISCONTINUED] omeprazole (PriLOSEC) 40 mg DR capsule Take 1 capsule (40 mg) by mouth once daily in the morning. Take before meals. Do not crush or chew. Open capsule, sprinkle beads on SF jello, pudding or applesauce. 30 capsule 5     No current facility-administered medications on file prior to visit.      Allergies   Allergen Reactions    Pollen Extracts Runny nose     REVIEW OF SYSTEMS:  Negative unless otherwise reviewed in HPI.    PHYSICAL EXAM   Physical Exam   Ht: 5'7\"  Wt: 276 lbs BMI: Body mass index is 43.23 kg/m².   General- No acute distress, well appearing and well nourished.   Eyes Conjunctiva and lids: No erythema, swelling or discharge  Neck appears supple  CV: self reports reg rate/rhythm  Pulmonary: Respiratory effort: Normal respiration. unlabored  Abdomen: Central adiposity  Neurologic - Coordination: Not assessed  Extremities: No clubbing, cyanosis  Psychiatric - Orientation to person, place, and time: Normal. Mood and affect: Normal.    ASSESSMENT & PLAN  44 y.o. female presenting for 3 mo pov s/p RYGB.     Weight Impression:  -Initial Weight: 339 lbs   -Today's Weight: 276 lbs   -%Total Weight Loss: -17.34 %    Problem List Items Addressed This Visit       Anemia    Relevant Orders    Ferritin    Iron and TIBC    Benign " hypertension     Goal BP <120/80  Continue follow up with PCP for mgmt of antiHTN regimen  Encourage adherence to medications if prescribed for BP control  DASH/Mediterranean Diet lifestyle encouraged.  Weight reduction of 5-10% of clinical significance to improve BP control  Continues to work on lifestyle change goals to support blood pressure control and weight reduction.  Continue to follow up with your primary care physician         Relevant Orders    CBC and Auto Differential    Comprehensive Metabolic Panel    Class 3 severe obesity due to excess calories without serious comorbidity with body mass index (BMI) of 50.0 to 59.9 in adult (Multi)    Relevant Medications    omeprazole (PriLOSEC) 40 mg DR capsule    Other Relevant Orders    CBC and Auto Differential    Vitamin B12 deficiency    Relevant Orders    Vitamin B12    Vitamin D deficiency    Relevant Orders    Vitamin D 25-Hydroxy,Total (for eval of Vitamin D levels)    S/P gastric bypass     Pt 3 months post op   s/p RYGB  diet ok - achieving protein and fluid goals  exercise - increasing as tolerated  supplements ok   no acute issues    recs:     cont low carb diet   increase exercise  cont supplementation , PPI  FU with dietitian   join support group   RTC 3 months  will get 3 months labs         Relevant Orders    CBC and Auto Differential    Comprehensive Metabolic Panel    Intestinal malabsorption following gastrectomy ([facility]) - Primary     Check micronutrient levels - see orders  Adjust micronutrient supplementation per results  Continue recommended post bariatric surgery supplements         Relevant Orders    Comprehensive Metabolic Panel    Ferritin    Folate    Iron and TIBC     Other Visit Diagnoses       Gastroesophageal reflux disease, unspecified whether esophagitis present        Relevant Medications    omeprazole (PriLOSEC) 40 mg DR capsule             Please see Patient Instructions for today's relevant referrals, orders and instructions.   > 50% of time spent counseling on lifestyle modifications to support weight loss.      Follow Up: Follow up in about 3 months (around 10/16/2024).     Courtney Addison, APRN-CNP

## 2024-07-15 NOTE — ASSESSMENT & PLAN NOTE
Pt 3 months post op   s/p RYGB  diet ok - achieving protein and fluid goals  exercise - increasing as tolerated  supplements ok   no acute issues    recs:     cont low carb diet   increase exercise  cont supplementation , PPI  FU with dietitian   join support group   RTC 3 months  will get 3 months labs

## 2024-07-16 ENCOUNTER — APPOINTMENT (OUTPATIENT)
Dept: SURGERY | Facility: CLINIC | Age: 44
End: 2024-07-16
Payer: COMMERCIAL

## 2024-07-16 VITALS — WEIGHT: 276 LBS | HEIGHT: 67 IN | BODY MASS INDEX: 43.32 KG/M2

## 2024-07-16 DIAGNOSIS — E53.8 VITAMIN B12 DEFICIENCY: ICD-10-CM

## 2024-07-16 DIAGNOSIS — K21.9 GASTROESOPHAGEAL REFLUX DISEASE, UNSPECIFIED WHETHER ESOPHAGITIS PRESENT: ICD-10-CM

## 2024-07-16 DIAGNOSIS — E66.01 CLASS 3 SEVERE OBESITY DUE TO EXCESS CALORIES WITHOUT SERIOUS COMORBIDITY WITH BODY MASS INDEX (BMI) OF 50.0 TO 59.9 IN ADULT (MULTI): ICD-10-CM

## 2024-07-16 DIAGNOSIS — Z90.3 INTESTINAL MALABSORPTION FOLLOWING GASTRECTOMY (HHS-HCC): Primary | ICD-10-CM

## 2024-07-16 DIAGNOSIS — E55.9 VITAMIN D DEFICIENCY: ICD-10-CM

## 2024-07-16 DIAGNOSIS — Z98.84 S/P GASTRIC BYPASS: ICD-10-CM

## 2024-07-16 DIAGNOSIS — D50.9 IRON DEFICIENCY ANEMIA, UNSPECIFIED IRON DEFICIENCY ANEMIA TYPE: ICD-10-CM

## 2024-07-16 DIAGNOSIS — I10 BENIGN HYPERTENSION: ICD-10-CM

## 2024-07-16 DIAGNOSIS — K91.2 INTESTINAL MALABSORPTION FOLLOWING GASTRECTOMY (HHS-HCC): Primary | ICD-10-CM

## 2024-07-16 PROCEDURE — 3008F BODY MASS INDEX DOCD: CPT

## 2024-07-16 PROCEDURE — 99211 OFF/OP EST MAY X REQ PHY/QHP: CPT | Mod: U1

## 2024-07-16 RX ORDER — OMEPRAZOLE 40 MG/1
40 CAPSULE, DELAYED RELEASE ORAL
Qty: 30 CAPSULE | Refills: 5 | Status: SHIPPED | OUTPATIENT
Start: 2024-07-16

## 2024-07-17 ENCOUNTER — APPOINTMENT (OUTPATIENT)
Dept: SURGERY | Facility: CLINIC | Age: 44
End: 2024-07-17
Payer: COMMERCIAL

## 2024-07-17 VITALS — WEIGHT: 276 LBS | HEIGHT: 67 IN | BODY MASS INDEX: 43.32 KG/M2

## 2024-07-18 ENCOUNTER — APPOINTMENT (OUTPATIENT)
Dept: SURGERY | Facility: CLINIC | Age: 44
End: 2024-07-18
Payer: COMMERCIAL

## 2024-08-06 ENCOUNTER — APPOINTMENT (OUTPATIENT)
Dept: PODIATRY | Facility: CLINIC | Age: 44
End: 2024-08-06
Payer: COMMERCIAL

## 2024-08-20 ENCOUNTER — TELEMEDICINE (OUTPATIENT)
Dept: BEHAVIORAL HEALTH | Facility: HOSPITAL | Age: 44
End: 2024-08-20
Payer: COMMERCIAL

## 2024-08-20 DIAGNOSIS — Z98.84 S/P GASTRIC BYPASS: ICD-10-CM

## 2024-08-20 DIAGNOSIS — F43.89 REACTION TO CHRONIC STRESS: ICD-10-CM

## 2024-08-20 PROCEDURE — 90837 PSYTX W PT 60 MINUTES: CPT | Performed by: PSYCHOLOGIST

## 2024-08-20 NOTE — PROGRESS NOTES
Time Started: 10:01  Time Ended: 11:00  Total time: 59 minutes  Visit type: virtual      Verbal consent was requested and obtained from patient on this date for a telehealth visit.   We discussed that the note will be visible and others healthcare practitioners will have access. The patient has consented to an unrestricted note due to working with a multidisciplinary team.     Reason(s) for visit: follow up s/p 4 month gastric bypass  Progress: she stated that she is no longer having weird dreams.   No hair loss.     [place]:   Not internalizing other's negative comments about her body since she has had the surgery.   Emotions tied to other's perceptions of her identity since she lost a significant amount of weight    Interventions: socratic questioning and discussion about weight stigma and biases from others.   Next steps: follow up in 2 months    MSE: Shoneia expressed some frustration at work due to off putting comments by her colleagues. No SI or plan.     Follow up: October

## 2024-08-27 ENCOUNTER — OFFICE VISIT (OUTPATIENT)
Dept: ORTHOPEDIC SURGERY | Facility: HOSPITAL | Age: 44
End: 2024-08-27
Payer: COMMERCIAL

## 2024-08-27 ENCOUNTER — HOSPITAL ENCOUNTER (OUTPATIENT)
Dept: RADIOLOGY | Facility: HOSPITAL | Age: 44
Discharge: HOME | End: 2024-08-27
Payer: COMMERCIAL

## 2024-08-27 VITALS — HEIGHT: 67 IN | BODY MASS INDEX: 42.25 KG/M2 | WEIGHT: 269.2 LBS

## 2024-08-27 DIAGNOSIS — M16.0 BILATERAL PRIMARY OSTEOARTHRITIS OF HIP: Primary | ICD-10-CM

## 2024-08-27 DIAGNOSIS — E66.01 MORBID OBESITY (MULTI): ICD-10-CM

## 2024-08-27 DIAGNOSIS — M25.551 BILATERAL HIP PAIN: ICD-10-CM

## 2024-08-27 DIAGNOSIS — M25.559 HIP PAIN, UNSPECIFIED LATERALITY: ICD-10-CM

## 2024-08-27 DIAGNOSIS — M25.552 BILATERAL HIP PAIN: ICD-10-CM

## 2024-08-27 PROCEDURE — 1036F TOBACCO NON-USER: CPT | Performed by: ORTHOPAEDIC SURGERY

## 2024-08-27 PROCEDURE — 99214 OFFICE O/P EST MOD 30 MIN: CPT | Performed by: ORTHOPAEDIC SURGERY

## 2024-08-27 PROCEDURE — 3008F BODY MASS INDEX DOCD: CPT | Performed by: ORTHOPAEDIC SURGERY

## 2024-08-27 PROCEDURE — 73502 X-RAY EXAM HIP UNI 2-3 VIEWS: CPT | Mod: RIGHT SIDE | Performed by: RADIOLOGY

## 2024-08-27 PROCEDURE — 73502 X-RAY EXAM HIP UNI 2-3 VIEWS: CPT | Mod: RT

## 2024-08-27 NOTE — PROGRESS NOTES
Subjective    Patient ID: Tom Trimble is a 44 y.o. female.    Chief Complaint: Bilateral hip pain    HPI  Patient presents today complaining of bilateral hip pain the pain on the right is worse than the left.  I seen her in the past and recommended weight loss.  Patient is interested in hip replacement surgery.  She underwent bypass surgery and she reported that she continue to lose weight.  However her current BMI today is 42 down from 45 the last time I saw her.  Since last time I saw her she received hip injections about 3 months ago.  The injection was minimally effective.  Patient is motivated to lose more weight and is to be a candidate for hip replacement surgery.  Patient is worse with activities and better with rest.  Pain radiates into the groin.  This is interfering with activities of daily living.  Objective   Ortho Exam  The patient is well-nourished and well-developed and in no acute distress. The patient displayed normal mood and affect. The patient's pupils were equal, round sclerae are white. Patient's respirations appear to be regular and unlabored.    Patient is able to ambulate with a mild antalgic gait .  Examination of the hip reveals no skin abnormalities.  No tenderness to palpation about the hip, knee and thigh area.  Range of motion of the hip reveals flexion past 90 degrees, patient has full extension, 40 degrees of hip abduction, 30 degrees of abduction, internal rotation to 20 degrees and external rotation to 45 degrees.  There is moderate pain with rotational range of motion of the hip.    Image Results:  I personally reviewed bilateral hip radiograph to review severe right hip osteoarthritis joint space narrowing and flattening of the femoral head cystic changes.    Assessment/Plan   Encounter Diagnoses:  Bilateral primary osteoarthritis of hip    Hip pain, unspecified laterality    Bilateral hip pain    Morbid obesity (Multi)  Patient has bilateral severe hip osteoarthritis.   Patient has significant loss amount of weight however her BMI still over 40.  The goal is to get the BMI less than 40.  I applauded her for her weight loss.  She needs to lose at least 22 pounds.  She may get intra-articular corticosteroid injections as needed however she will need to wait 3 months after an injection before proceeding with hip replacement surgery.  If patient is able to achieve weight loss she will call our joint replacement nurse to schedule hip replacement surgery.  She will need to be weighed.  She would like to proceed with right hip replacement surgery first.  I talked with the patient at length about risks, limitations, benefits and alternatives to total hip replacement today. Under my care or the care of previous providers, the patient has had a reasonable trial of nonoperative treatment for their hip problem including NSAIDS, tylenol  or other analgesics, activity modification and activity restriction and use of assistive devices.  These  previous treatments have not provided the patient with durable relief of their symptoms.The patient is not an appropriate candidate for physical therapy at this time. Despite the above, patient has had pain and symptomatic functional impairment that either interferes with their sleep or ADLs and quality of life. I reviewed concerns about implant wear, loosening, breakage, infection and infection prophylaxis, DVT, PE, death and other medical and anesthetic complications of surgery. We talked about the potential for persistent pain following surgery since there are many possible causes for hip and leg pain. The patient was advised that hip replacement will only relieve pain that is coming from the hip. We talked about leg length discrepancy, neurovascular problems and dislocation after surgery. The patient understands that we may have to lengthen the leg slightly to provide for adequate stability of the hip. I reviewed dislocation precautions and activity  restrictions in detail. We discussed advantages and disadvantages of cemented and cementless implant fixation. We discussed the concerns about intraoperative fracture, ingrowth failure, thigh pain and possible post-operative weight bearing restrictions following cementless hip replacement. We discussed advantages and disadvantages of different surgical approaches. The basic concepts of the joint replacement procedure has been reviewed with the patient and the patient has been provided the opportunity to see an actual implant either in the office or in our pre-op education class. We discussed the possible need for a homologous blood transfusion. We discussed the fact that many of our patients are able to go home as outpatient or 1 -2 days depending on their health, mobility, pre-op preparation, individual home situation and personal preference. The patient should take our pre-operative teaching class. All of the patients questions were answered. The patient can call my office to schedule surgery and the pre-op teaching class. I told the patient that they should contact their primary care physician to discuss fitness for surgery.          Note dictated with ITT EXIM software.  Completed without full type editing and sent to avoid delay.    Orders Placed This Encounter    XR hip right with pelvis when performed 2 or 3 views     No follow-ups on file.

## 2024-09-04 ENCOUNTER — OFFICE VISIT (OUTPATIENT)
Dept: PRIMARY CARE | Facility: CLINIC | Age: 44
End: 2024-09-04
Payer: COMMERCIAL

## 2024-09-04 VITALS
TEMPERATURE: 98.2 F | SYSTOLIC BLOOD PRESSURE: 148 MMHG | HEIGHT: 67 IN | RESPIRATION RATE: 16 BRPM | OXYGEN SATURATION: 100 % | BODY MASS INDEX: 41.75 KG/M2 | WEIGHT: 266 LBS | HEART RATE: 75 BPM | DIASTOLIC BLOOD PRESSURE: 89 MMHG

## 2024-09-04 DIAGNOSIS — E66.01 CLASS 3 SEVERE OBESITY DUE TO EXCESS CALORIES WITHOUT SERIOUS COMORBIDITY WITH BODY MASS INDEX (BMI) OF 50.0 TO 59.9 IN ADULT (MULTI): ICD-10-CM

## 2024-09-04 DIAGNOSIS — Z98.84 BARIATRIC SURGERY STATUS: ICD-10-CM

## 2024-09-04 DIAGNOSIS — M16.11 PRIMARY OSTEOARTHRITIS OF RIGHT HIP: ICD-10-CM

## 2024-09-04 DIAGNOSIS — L73.2 HIDRADENITIS: Primary | ICD-10-CM

## 2024-09-04 PROCEDURE — 3008F BODY MASS INDEX DOCD: CPT | Performed by: NURSE PRACTITIONER

## 2024-09-04 PROCEDURE — 3077F SYST BP >= 140 MM HG: CPT | Performed by: NURSE PRACTITIONER

## 2024-09-04 PROCEDURE — 99214 OFFICE O/P EST MOD 30 MIN: CPT | Performed by: NURSE PRACTITIONER

## 2024-09-04 PROCEDURE — 3079F DIAST BP 80-89 MM HG: CPT | Performed by: NURSE PRACTITIONER

## 2024-09-04 PROCEDURE — 1036F TOBACCO NON-USER: CPT | Performed by: NURSE PRACTITIONER

## 2024-09-04 RX ORDER — DICLOFENAC SODIUM 10 MG/G
4 GEL TOPICAL 4 TIMES DAILY
Qty: 100 G | Refills: 1 | Status: SHIPPED | OUTPATIENT
Start: 2024-09-04

## 2024-09-04 RX ORDER — DOXYCYCLINE 100 MG/1
100 CAPSULE ORAL 2 TIMES DAILY
Qty: 14 CAPSULE | Refills: 0 | Status: SHIPPED | OUTPATIENT
Start: 2024-09-04 | End: 2024-09-11

## 2024-09-04 RX ORDER — CLINDAMYCIN PHOSPHATE 10 UG/ML
LOTION TOPICAL AS NEEDED
Qty: 60 ML | Refills: 7 | Status: SHIPPED | OUTPATIENT
Start: 2024-09-04

## 2024-09-04 RX ORDER — LIDOCAINE 50 MG/G
1 PATCH TOPICAL DAILY
Qty: 30 PATCH | Refills: 3 | Status: SHIPPED | OUTPATIENT
Start: 2024-09-04 | End: 2025-09-04

## 2024-09-04 RX ORDER — CALCIUM CITRATE/VITAMIN D3 500MG-12.5
1500 TABLET,CHEWABLE ORAL DAILY
Qty: 270 TABLET | Refills: 3 | Status: SHIPPED | OUTPATIENT
Start: 2024-09-04

## 2024-09-04 ASSESSMENT — PAIN SCALES - GENERAL: PAINLEVEL: 10-WORST PAIN EVER

## 2024-09-04 NOTE — PROGRESS NOTES
"Subjective   Shoneia M Collins \"Tom\" is a 44 y.o. female who presents for Follow-up.  HPI  Ms. Trimble is a 45 yo F here today for follow up.   Notes that she has been doing well generally. She is on target with her weight loss goals and is working to get her BMI to 39 to have hip procedure. She notes that her hip pain has been a barrier toward optimizing weight loss, as typical exercise is very painful. Due to bariatric status she is also restricted from NSAIDs, which also provide limitation. Overall she is trying to keep a positive attitude. She has been very adherent to her diet. She plans to get back in the pool, which has previously tolerable for her, and helped a bit with pain. She is open to topical solutions for pain management.   Blood pressure elevated today. Patient reviews normotensive recent values with me, notes that she has been feeling well. She would like to continue to monitor. Agreeable to call if blood pressure values are elevated at follow up appointments. Denies headache, chest pain, palpitations, blurred vision, or dizziness  Patient notes that since her weight loss, she has had an HS exacerbation, particularly to abdominal skin fold. She has been trying to manage with conservative measures but notes multiple boils. Denies fever or chills. She notes areas have been opening spontaneously. She does not report drainage currently.     All systems reviewed. Review of systems negative except for noted positives in HPI    Objective     /89   Pulse 75   Temp 36.8 °C (98.2 °F)   Resp 16   Ht 1.702 m (5' 7\")   Wt 121 kg (266 lb)   LMP 10/07/2021   SpO2 100%   BMI 41.66 kg/m²    Vital signs noted and reviewed.       Physical Exam  +HS outbreak to lower torso within abdominal skin fold  Constitutional:       Appearance: Normal appearance.   Cardiovascular:      Rate and Rhythm: Normal rate and regular rhythm.   Pulmonary:      Effort: Pulmonary effort is normal. No respiratory distress. "      Breath sounds: Normal breath sounds.   Skin:     General: Skin is warm and dry.   Neurological:      Mental Status: She is oriented to person, place, and time.   Psychiatric:         Mood and Affect: Mood normal.       Assessment/Plan   Problem List Items Addressed This Visit       Class 3 severe obesity due to excess calories without serious comorbidity with body mass index (BMI) of 50.0 to 59.9 in adult (Multi)     Other Visit Diagnoses       Hidradenitis    -  Primary    Relevant Medications    clindamycin (Cleocin T) 1 % lotion    doxycycline (Vibramycin) 100 mg capsule    Other Relevant Orders    Referral to Dermatology    Primary osteoarthritis of right hip        Relevant Medications    diclofenac sodium (Voltaren) 1 % gel    lidocaine (Lidoderm) 5 % patch    Bariatric surgery status        Relevant Medications    calcium citrate-vitamin D3 500 mg-12.5 mcg (500 unit) tablet,chewable

## 2024-09-04 NOTE — PATIENT INSTRUCTIONS
Thank you for coming in for your visit today!    Please follow up in 4-5 months or sooner if needed.     For your blood pressure:  Keep a log of your blood pressure. Be sure to bring it with you to your next appointment so we can review it together.  Adhere to the DASH diet. This includes decreasing your salt/sodium intake. Avoid canned foods, lunch meats, and frozen foods.  Exercise for 30 minutes daily.    Go to have your fasting blood work completed tomorrow.     Use the clindamycin solution as needed. Get re-connected with dermatology.    Call to schedule with hematology.     Keep up the great work!  Get back in the pool!  You are doing an awesome job!    Call 911 or go to the emergency room if you have pain in your chest, difficulty breathing, or other life threatening symptoms.

## 2024-09-10 ENCOUNTER — APPOINTMENT (OUTPATIENT)
Dept: DERMATOLOGY | Facility: CLINIC | Age: 44
End: 2024-09-10
Payer: COMMERCIAL

## 2024-09-10 DIAGNOSIS — L73.2 HIDRADENITIS SUPPURATIVA: Primary | ICD-10-CM

## 2024-09-10 PROCEDURE — 99214 OFFICE O/P EST MOD 30 MIN: CPT | Performed by: STUDENT IN AN ORGANIZED HEALTH CARE EDUCATION/TRAINING PROGRAM

## 2024-09-10 RX ORDER — CHLORHEXIDINE GLUCONATE 40 MG/ML
SOLUTION TOPICAL
Qty: 473 ML | Refills: 11 | Status: SHIPPED | OUTPATIENT
Start: 2024-09-10

## 2024-09-10 RX ORDER — DOXYCYCLINE 100 MG/1
100 CAPSULE ORAL 2 TIMES DAILY
Qty: 180 CAPSULE | Refills: 0 | Status: SHIPPED | OUTPATIENT
Start: 2024-09-10 | End: 2024-12-09

## 2024-09-10 NOTE — PROGRESS NOTES
Subjective   Tom Trimble is a 44 y.o. female who presents for the following: Hidradenitis Suppurativa (Referred by PCP for worsening HS.  Pt states flares have worsened since losing weight (gastric bypass 4/22/24).  Abdomen and groin.  Currently using Clindamycin 1% lotion and Hibiclens (needs refills). )    The following portions of the chart were reviewed this encounter and updated as appropriate:         Review of Systems: No other skin or systemic complaints.    Objective   Well appearing patient in no apparent distress; mood and affect are within normal limits.    A focused examination was performed including chest, axillae, abdomen, back, and buttocks. All findings within normal limits unless otherwise noted below.    Left Suprapubic Area, Right Suprapubic Area  Under skin folds are multiple nodules and sinus tracts - adbomen and buttocks.   Axilla are clear per patient      Assessment/Plan       Hidradenitis suppurativa  Left Suprapubic Area; Right Suprapubic Area    Discussed chronic and flaring nature of HS   -Discussed treatment options aimed at reducing frequency and intensity of flares   -Discussed multiple components of management including:     --Minimizing skin trauma and follicular occlusion. Avoid aggressive/exfoliating scrubs, and avoid tight-fitting clothing   --Topical therapies including daily hibiclens wash to affected areas followed by Clindamycin 1% lotion   --start PO Doxycycline 100mg bid x 3 months  --in future, if desired/indicated, ILK, vs other systemic or surgical treatments    Follow Up In Dermatology - Established Patient - Left Suprapubic Area, Right Suprapubic Area    chlorhexidine (Hibiclens) 4 % external liquid - Left Suprapubic Area, Right Suprapubic Area  Use as a wash to affected areas once daily    doxycycline (Monodox) 100 mg capsule - Left Suprapubic Area, Right Suprapubic Area  Take 1 capsule (100 mg) by mouth 2 times a day. Take with at least 8 ounces (large glass)  of water, do not lie down for 30 minutes after    RTC 3 months or sooner PRN    Scribe Attestation  By signing my name below, I, Deb Ford LPN , Scribe   attest that this documentation has been prepared under the direction and in the presence of Basil Kendall MD.

## 2024-10-14 PROBLEM — Z71.3 DIETARY COUNSELING: Status: ACTIVE | Noted: 2024-10-14

## 2024-10-14 NOTE — PROGRESS NOTES
BARIATRIC SURGERY CLINIC  Comprehensive Weight Management          Name: Shoneia M Collins  MRN: [id number]     Index Surgery  Date of Surgery: 4/22/24   Surgeon: Luis Armando Benito MD  Surgical Procedure: Laparoscopic bakari en y gastric bypass 21195    Virtual or Telephone Consent:  A telephone visit (audio and video) between the patient (at the originating site) and the provider (at the distant site) was utilized to provide this telehealth service. Verbal consent was requested and obtained from Shoneia M Collins on this date, 10/15/24 for a bariatric telehealth visit.     HPI   Shoneia M Collins is a 44 y.o. female presenting for 6 mo pov s/p RYGB.     Diet:  - Stage: regular food diet  - Achieving protein and fluid goals most days: yes     Exercise:  - walking, increasing activity as tolerated.  - physical therapy now for hip, having THR when weight is ~245 lbs    Concerns related to:  Nausea/Vomiting, Reflux: denies  Abdominal Pain: denies  Diarrhea/Constipation- bowel function is regular    Daily Supplements:  Calcium: Calcium Citrate w/ vitamin D (1200 - 1500mg)  Multivitamin & Minerals: 2 per day  Vitamin B12: 500 mcg/day   Vitamin D3: 3000 units   Iron/Other: iron supplement   PPI: n/a    Primary Medical Hx:  Patient Active Problem List   Diagnosis    Anemia    Anosmia    Anovular menstruation    Arthritis    Benign hypertension    Candidal vulvovaginitis    Cervical lymphadenopathy    Snoring    Dysmenorrhea    Fatigue    Hidradenitis suppurativa    Class 3 severe obesity due to excess calories without serious comorbidity with body mass index (BMI) of 50.0 to 59.9 in adult    Mass of breast, right    Menorrhagia    Primary osteoarthritis of both hips    Polyphagia    Sleep apnea    Tinea corporis    Varicose veins of leg with edema, bilateral    Vitamin B12 deficiency    Vitamin D deficiency    Thrombophlebitis    S/P GAVIN (total abdominal hysterectomy)    Left lower quadrant abdominal pain    Back pain     Hemorrhage of varicose veins of left lower extremity    Hyperglycemia    Chronic left shoulder pain    Lingual tonsil hypertrophy    Smell and taste disorder    Subacromial bursitis of left shoulder joint    Varicose veins with pain    Inadequate caloric intake    Inadequate dietary caloric intake    S/P gastric bypass    Umbilical hernia without obstruction and without gangrene    Use of cannabis    Eating disorder in remission    Reaction to chronic stress    History of marijuana use    Cannabis use, unspecified, in remission    Generalized abdominal pain    Localized swelling of left lower leg    Pain of right hip joint    Strain of muscle of hip    Morbid obesity (Multi)    Morbid obesity due to excess calories (Multi)    Intestinal malabsorption following gastrectomy ([facility])    Dietary counseling      Past Surgical History:   Procedure Laterality Date    BARIATRIC SURGERY  2024    LAP RYGB WITH DR MYERS @ [facility]     SECTION, CLASSIC  11/10/2014     Section    GALLBLADDER SURGERY  11/10/2014    Gallbladder Surgery    HYSTERECTOMY      OTHER SURGICAL HISTORY  2021    Laparoscopic hysterectomy    OTHER SURGICAL HISTORY  2021    Bilateral salpingectomy    OTHER SURGICAL HISTORY  2021    Cystoscopy    TONSILLECTOMY      TUBAL LIGATION  2017    Tubal Ligation      Social History     Socioeconomic History    Marital status: Single     Spouse name: Not on file    Number of children: Not on file    Years of education: Not on file    Highest education level: Not on file   Occupational History    Not on file   Tobacco Use    Smoking status: Former     Current packs/day: 0.00     Average packs/day: 1 pack/day for 2.0 years (2.0 ttl pk-yrs)     Types: Cigarettes     Start date:      Quit date:      Years since quittin.8     Passive exposure: Never    Smokeless tobacco: Never    Tobacco comments:     24:  Verified verbally [facility] LPN   Vaping Use    Vaping status:  Never Used   Substance and Sexual Activity    Alcohol use: Not Currently     Comment: 6/4/24: Verbalizes none since surgery hx socially, rarely  [facility] LPN    Drug use: Yes     Types: Marijuana     Comment: 6/4/24:  Denies current [facility] LPN    Sexual activity: Yes     Partners: Male     Birth control/protection: Female Sterilization, Condom Male   Other Topics Concern    Not on file   Social History Narrative    Not on file     Social Determinants of Health     Financial Resource Strain: Low Risk  (4/23/2024)    Overall Financial Resource Strain (CARDIA)     Difficulty of Paying Living Expenses: Not hard at all   Food Insecurity: No Food Insecurity (11/17/2023)    Hunger Vital Sign     Worried About Running Out of Food in the Last Year: Never true     Ran Out of Food in the Last Year: Never true   Transportation Needs: No Transportation Needs (4/23/2024)    PRAPARE - Transportation     Lack of Transportation (Medical): No     Lack of Transportation (Non-Medical): No   Physical Activity: Not on file   Stress: Not on file   Social Connections: Not on file   Intimate Partner Violence: Not At Risk (10/6/2023)    Humiliation, Afraid, Rape, and Kick questionnaire     Fear of Current or Ex-Partner: No     Emotionally Abused: No     Physically Abused: No     Sexually Abused: No   Housing Stability: Low Risk  (4/23/2024)    Housing Stability Vital Sign     Unable to Pay for Housing in the Last Year: No     Number of Places Lived in the Last Year: 1     Unstable Housing in the Last Year: No     Family History   Problem Relation Name Age of Onset    Lung disease Mother      Diabetes Paternal Grandmother      Diabetes Other        Current Outpatient Medications on File Prior to Visit   Medication Sig Dispense Refill    calcium citrate-vitamin D3 500 mg-12.5 mcg (500 unit) tablet,chewable Chew 1,500 mg once daily. 270 tablet 3    chlorhexidine (Hibiclens) 4 % external liquid Use as a wash to affected areas once daily 473 mL 11     "clindamycin (Cleocin T) 1 % lotion Apply topically if needed (for HS). 60 mL 7    diclofenac sodium (Voltaren) 1 % gel Apply 4.5 inches (4 g) topically 4 times a day. 100 g 1    dicyclomine (Bentyl) 10 mg capsule Take 1 capsule (10 mg) by mouth 4 times a day before meals. Take with meals and at bedtime 120 capsule 11    doxycycline (Monodox) 100 mg capsule Take 1 capsule (100 mg) by mouth 2 times a day. Take with at least 8 ounces (large glass) of water, do not lie down for 30 minutes after 180 capsule 0    ferrous sulfate, 325 mg ferrous sulfate, tablet Take 1 tablet by mouth once daily. 90 tablet 3    Fiber Therapy, m-cellulose, 500 mg tablet       fluticasone (Flonase) 50 mcg/actuation nasal spray Administer into affected nostril(s) if needed.      ketoconazole (NIZOral) 2 % cream Apply topically if needed.      lidocaine (Lidoderm) 5 % patch Place 1 patch over 12 hours on the skin once daily. Apply to painful area 12 hours per day, remove for 12 hours. 30 patch 3    loratadine (Claritin Reditabs) 10 mg disintegrating tablet Take 1 tablet (10 mg) by mouth once daily.      multivitamin tablet TAKE ONE (1) TABLET BY MOUTH ONCE DAILY. 90 tablet 0    omeprazole (PriLOSEC) 40 mg DR capsule Take 1 capsule (40 mg) by mouth once daily in the morning. Take before meals. Do not crush or chew. Open capsule, sprinkle beads on SF jello, pudding or applesauce. 30 capsule 5    tiZANidine (Zanaflex) 4 mg capsule Take 1 capsule (4 mg) by mouth once daily at bedtime. 30 capsule 11     No current facility-administered medications on file prior to visit.      Allergies   Allergen Reactions    Pollen Extracts Runny nose     REVIEW OF SYSTEMS:  Negative unless otherwise reviewed in HPI.    PHYSICAL EXAM   Physical Exam   Ht: 5'7\"  Wt: 252 lbs   BMI: Body mass index is 39.47 kg/m².   Alert, well appearing, no acute distress, nourished, hydrated.  Anicteric sclera, no ptosis  Facial symmetry  Neck supple  Unlabored respirations.  Easily " conversant without increased respiratory effort  Oriented to person, place, time.  Judgement intact.  Appropriate mood, affect.     ASSESSMENT & PLAN  44 y.o. female presenting for 6 mo pov s/p RYGB. Nearing goal weight for THR. Doing well with steady weight loss. Requests referral to plastics for evaluation HS and skin removal. Taking appropriate supplements. Counseled on diet, exercise and lifestyle modifications. Labs reviewed with patient and supplements adjusted as needed. Follow up in 6 months, sooner if needed.    Weight Impression:  -Initial Weight: 339 lbs   -Today's Weight: 252 lbs   -%Total Weight Loss: -126.00 %     Problem List Items Addressed This Visit       Benign hypertension     Goal BP <120/80  Continue follow up with PCP for mgmt of antiHTN regimen  Encourage adherence to medications if prescribed for BP control  DASH/Mediterranean Diet lifestyle encouraged.  Weight reduction of 5-10% of clinical significance to improve BP control  Continues to work on lifestyle change goals to support blood pressure control and weight reduction.  Continue to follow up with your primary care physician         Hidradenitis suppurativa     - seeing derm specialist, requests additional referral to plastic surgery for evaluation. Eventually wants skin removal for excess skin however pain/infection from HS rash interfering now with ADL's.   - referral placed.         Relevant Orders    Referral to Plastic Surgery    S/P gastric bypass     Pt 6 months post op   s/p RYGB  diet ok - achieving protein and fluid goals  exercise - increasing as tolerated  supplements ok   no acute issues     recs:      cont low carb diet   increase exercise  cont supplementation , PPI  FU with dietitian   join support group   RTC 6 months, labs- see orders         Relevant Orders    Referral to Plastic Surgery    Intestinal malabsorption following gastrectomy ([facility]) - Primary     Check micronutrient levels - see orders  Adjust  micronutrient supplementation per results  Continue recommended post bariatric surgery supplements         Dietary counseling     - Counseled on benefits of increased regular exercise, both aerobic & resistance training.  Reviewed benefits of resistance training to enhance/maintain lean body mass.  - Counseled on dietary modifications to support weight reduction, reduced calorie diet, encourage adequate protein, increased dietary fiber from fruit and vegetable to improve appetite/satiety regulation and quality of diet.  Discussed impact of processed foods and liquid calories to weight gain & contribution to dysfunctional appetite signals.   -  Reviewed importance of intensification of lifestyle therapy in weight reduction and maintenance, set behavioral modification goals of nutrition, physical activity or behavioral practices to benefit weight reduction.    - Discussed self monitoring practices to ensure reduce calorie diet, emphasizing increased dietary protein & fiber.  Reviewed protein targets per meal as recommendation to help with satiety and lean mass maintenance.        Please see Patient Instructions for today's relevant referrals, orders and instructions. Preventative health counseling for fifteen minutes face to face for diet, exercise and lifestyle modifications for the promotion of sustained and ongoing weight loss.      Follow Up: Follow up in about 6 months (around 4/15/2025).     Courtney Addison, APRN-CNP

## 2024-10-14 NOTE — PATIENT INSTRUCTIONS
"The following are the recommendations we discussed at your appointment today:  1. Nutrition  - Please make sure you are seeing the bariatric dietitian regularly.    Dietitian Information:   Rody Martinez: [phone]  [facility] [place] [phone]    Your Protein Goals will gradually increase as you get further out from surgery:  0-3 months - 60 GRAMS PER DAY  3-6 months - 60-75 GRAMS PER DAY  6-12 months - 75-90 GRAMS PER DAY  12+ months - 80-90 GRAMS PER DAY    - Follow Pouch Rules;.   Stop drinking 30 minutes before your meals, Take 30 minutes to eat your meal   - NO DRINKING DURING MEALS, Wait for 30 minutes after your meal to drink  - Eat 5 servings of fruits and veggies daily.  A serving is 1 small (tennis ball size) piece of whole fruit, 1/2 cup fresh fruit, 1 cup non starchy vegetables  - Eat 3 small meals and 1-2 healthy, protein rich, snacks per day.    EAT PROTEIN FIRST AT MEALS, 2nd non starchy vegetable or fruit serving, consume starches last and aim for whole grains of small portion.  This pattern of eating ensures you are getting full on high quality protein and higher fiber foods with many vitamins and minerals to support adequate nutrition first.      2. Exercise Recommendations:    Regular physical activity is CRITICAL for long term successful weight management.    Strive for a minimum weekly exercise goal of at least 200 minutes per week of aerobic exercise (45 minutes at least 5 days per week or 30 minutes daily)    Strength based resistance training is critical for helping to maintain and build lean body mass/muscle mass which is very important for long term health.  Having higher muscle mass is associated with better health outcomes and can help to maintain higher calorie expenditure.      Designing a Strength Program:  Select 3-4 exercises that target different major muscle groups.  - Emphasize the following movements \"pull, push, squat, hip hinge\"  \"Pull\" examples - pull up, " "bent over row or dumbbell row, pull down with weight bar or resistance band  \"Push\" examples - push up, bench press, chest flys, dips, overhead press, dumbbell bench press  \"Squat\" examples - air squat, chair squat, lunge steps, goblet squat, front squat, etc  \"Hip hinge\" examples - kettle bell swing, good morning, glute bridge, deadlift, suitcase pick ups, hip thrust    Perform two to three sets of eight to 15 repetitions of each exercise.  Try to use a resistance that feels like an \"8 out of 10\" effort, with 10 being the highest effort you can give.    To get stronger, try increasing either the weight, number of repetitions, number of sets or number of exercises every 4-8 weeks as you feel more comfortable with your current program.      3. Fluids  - Drink at least 60 oz of water every day.   - Wait 30 minutes before or after meals to drink fluids.  - Avoid carbonated beverages.    4. Vitamins  - Remember to take your multivitamins 2 times daily, once in the morning and once in the evening  - Take your calcium 2-3 times daily, at least 2 hours apart from the multivitamin - total daily dose at least 1200-1500mg per day.    - Vitamin D3 3000 units per day  - B12 - depending on your blood work and how well you absorb B12 from your multivitamin you may need additional B12 of 350-500mcg oral per day.    5. Labs  - We will check labs today and results will be communicated via [facility] My Chart  - A copy of the results can be viewed on  My Chart.      Follow-up with Dietitian for bariatric diet optimization  Follow-up with PCP for general health questions and ongoing management of routine health concerns      "

## 2024-10-15 ENCOUNTER — TELEMEDICINE (OUTPATIENT)
Dept: SURGERY | Facility: CLINIC | Age: 44
End: 2024-10-15
Payer: COMMERCIAL

## 2024-10-15 VITALS — HEIGHT: 67 IN | BODY MASS INDEX: 39.55 KG/M2 | WEIGHT: 252 LBS

## 2024-10-15 DIAGNOSIS — L73.2 HIDRADENITIS SUPPURATIVA: ICD-10-CM

## 2024-10-15 DIAGNOSIS — Z98.84 S/P GASTRIC BYPASS: ICD-10-CM

## 2024-10-15 DIAGNOSIS — Z90.3 INTESTINAL MALABSORPTION FOLLOWING GASTRECTOMY (HHS-HCC): Primary | ICD-10-CM

## 2024-10-15 DIAGNOSIS — Z71.3 DIETARY COUNSELING: ICD-10-CM

## 2024-10-15 DIAGNOSIS — I10 BENIGN HYPERTENSION: ICD-10-CM

## 2024-10-15 DIAGNOSIS — K91.2 INTESTINAL MALABSORPTION FOLLOWING GASTRECTOMY (HHS-HCC): Primary | ICD-10-CM

## 2024-10-15 PROCEDURE — 99213 OFFICE O/P EST LOW 20 MIN: CPT | Mod: GT,U1,95

## 2024-10-15 PROCEDURE — 99401 PREV MED CNSL INDIV APPRX 15: CPT | Mod: U1

## 2024-10-15 PROCEDURE — 99401 PREV MED CNSL INDIV APPRX 15: CPT

## 2024-10-15 PROCEDURE — 99213 OFFICE O/P EST LOW 20 MIN: CPT

## 2024-10-15 PROCEDURE — 3008F BODY MASS INDEX DOCD: CPT

## 2024-10-15 NOTE — PROGRESS NOTES
"Follow Up Bariatric Nutrition Assessment                  Name: Shoneia M Collins  MRN: [id number]  Date: 10/17/24    Surgery Date:  4/22/24  Surgeon:  Thong  Procedure:  RNYGB    ASSESSMENT:  Current weight:      Vitals:    10/17/24 1333   Weight: 113 kg (250 lb)                 Ht: 1.702 m (5' 7\")  BMI:  Body mass index is 39.16 kg/m².  Previous weight:   276lbs  Initial start weight:  339lbs  EBW: 180lbs  Total weight change:  89lbs  %EBW Lost: 49.4%    PROGRESS:    Nutrition Interventions for last encounter    Eat 60-70 g of protein per day. Start using your 20g protein drink in the morning.   Limit special snacks to 1-2x per week.   Drink 64 oz. of zero calorie beverages per day  Continue no drinking 30 min before, during the meal and for 30 minutes after the meal  Increase intensity and duration of exercise  Get your labs taken.      CHANGES IN TREATMENT:   Patient met goals: met    24 hour food recall:   Breakfast:  coffee protein shake (30g)  Snack: egg bites w/Isopure water   Lunch: chicken/steak (3-4oz)  Snack:   [employer] Chips or protein bites (10g) or turkey jerky (6-12g)  Dinner:  meat (3-4oz) sometimes with starch or eggs   Snack: SF snacks   Beverages: water   Alcohol: tried white claw       Vitamins:  Bariatric pal MVI w/B12 1x per day or MVI 2x and B12,  1200-1500mg total Calcium citrate, biotin, iron   Medications:   Current Outpatient Medications:     calcium citrate-vitamin D3 500 mg-12.5 mcg (500 unit) tablet,chewable, Chew 1,500 mg once daily., Disp: 270 tablet, Rfl: 3    chlorhexidine (Hibiclens) 4 % external liquid, Use as a wash to affected areas once daily, Disp: 473 mL, Rfl: 11    clindamycin (Cleocin T) 1 % lotion, Apply topically if needed (for HS)., Disp: 60 mL, Rfl: 7    diclofenac sodium (Voltaren) 1 % gel, Apply 4.5 inches (4 g) topically 4 times a day., Disp: 100 g, Rfl: 1    dicyclomine (Bentyl) 10 mg capsule, Take 1 capsule (10 mg) by mouth 4 times a day before meals. Take with meals " and at bedtime, Disp: 120 capsule, Rfl: 11    doxycycline (Monodox) 100 mg capsule, Take 1 capsule (100 mg) by mouth 2 times a day. Take with at least 8 ounces (large glass) of water, do not lie down for 30 minutes after, Disp: 180 capsule, Rfl: 0    ferrous sulfate, 325 mg ferrous sulfate, tablet, Take 1 tablet by mouth once daily., Disp: 90 tablet, Rfl: 3    Fiber Therapy, m-cellulose, 500 mg tablet, , Disp: , Rfl:     fluticasone (Flonase) 50 mcg/actuation nasal spray, Administer into affected nostril(s) if needed., Disp: , Rfl:     ketoconazole (NIZOral) 2 % cream, Apply topically if needed., Disp: , Rfl:     lidocaine (Lidoderm) 5 % patch, Place 1 patch over 12 hours on the skin once daily. Apply to painful area 12 hours per day, remove for 12 hours., Disp: 30 patch, Rfl: 3    loratadine (Claritin Reditabs) 10 mg disintegrating tablet, Take 1 tablet (10 mg) by mouth once daily., Disp: , Rfl:     multivitamin tablet, TAKE ONE (1) TABLET BY MOUTH ONCE DAILY., Disp: 90 tablet, Rfl: 0    omeprazole (PriLOSEC) 40 mg DR capsule, Take 1 capsule (40 mg) by mouth once daily in the morning. Take before meals. Do not crush or chew. Open capsule, sprinkle beads on SF jello, pudding or applesauce., Disp: 30 capsule, Rfl: 5    tiZANidine (Zanaflex) 4 mg capsule, Take 1 capsule (4 mg) by mouth once daily at bedtime., Disp: 30 capsule, Rfl: 11  Physical Activity: limited    READINESS TO LEARN:  Motivation to learn:   Interested      Understanding of instruction: Good       Anticipated Compliance: Good       Family Support: Unable to assess-family not present     Patient presents with post-op weight loss surgery gastric bypass. Pt is 6 months post op.   Patient has lost 89 pounds since initial assessment accounting for 49.4% loss excess body weight.  Tolerating regular diet without difficulty.  Protein intake is  adequate for post-op individual. Fluid consumption is adequate. Has not been strict with practicing the 30s rule.  Patient is supplementing recommended vitamin/minerals. Uses a timer to help her remember. Pt states concerns/difficulties with exercise. Has been having a lot of hip pain which has limited her exercise. Saw ortho and was told that once she gets down to 245lbs, she can schedule a hip replacement. Her worries about the surgery has increased her night time snacking. Is worried that she falling into this old habits and with plans to start working with psych again.     Malnutrition Screening  Significant unintentional weight loss? n/a  Eating less than 75% of usual intake for more than 2 weeks? n/a     Nutrition Diagnosis:   Increased protein and nutrition needs related to altered GI function as evidenced by pt. s/p gastric bypass.  Food- and nutrition-related knowledge deficit related to lack of prior exposure to surgical weight loss information as evidenced by diet recall.     Nutrition Interventions:   Modify type and amount of food and nutrients within meals and snacks.  Comprehensive Nutrition Education  -Nutrition education materials:         Recommendations:    Eat 70-80 g of protein per day. Limit yourself to 1 protein drink a day.   Have some fruits and veggies for snacks.   Try to do things to get your mind off of snacking at night.  Drink 64 oz. of zero calorie beverages per day  Be sure to avoid drinking 30 min before, during the meal and for 30 minutes after the meal  Look into starting bungee fit.   Continue current vit/min regimen  Schedule an appointment with psych     Nutrition Monitoring and Evaluation:   1-2 pounds weight loss per week  Criteria: weight check, food recall  Need for Follow-up: annually or PRN    Leela Chatterjee MS, RD, LD  Phone: [phone]

## 2024-10-17 ENCOUNTER — APPOINTMENT (OUTPATIENT)
Dept: SURGERY | Facility: CLINIC | Age: 44
End: 2024-10-17
Payer: COMMERCIAL

## 2024-10-17 VITALS — HEIGHT: 67 IN | BODY MASS INDEX: 39.24 KG/M2 | WEIGHT: 250 LBS

## 2024-10-22 ENCOUNTER — APPOINTMENT (OUTPATIENT)
Dept: BEHAVIORAL HEALTH | Facility: CLINIC | Age: 44
End: 2024-10-22
Payer: COMMERCIAL

## 2024-11-05 ENCOUNTER — TELEPHONE (OUTPATIENT)
Dept: ORTHOPEDIC SURGERY | Facility: HOSPITAL | Age: 44
End: 2024-11-05
Payer: COMMERCIAL

## 2024-11-05 PROBLEM — I10 BENIGN HYPERTENSION: Status: RESOLVED | Noted: 2018-10-30 | Resolved: 2024-11-05

## 2024-11-05 PROBLEM — R43.0 ANOSMIA: Status: RESOLVED | Noted: 2023-08-02 | Resolved: 2024-11-05

## 2024-11-05 PROBLEM — N94.6 DYSMENORRHEA: Status: RESOLVED | Noted: 2023-08-02 | Resolved: 2024-11-05

## 2024-11-05 PROBLEM — M25.551 PAIN OF RIGHT HIP JOINT: Status: RESOLVED | Noted: 2023-01-23 | Resolved: 2024-11-05

## 2024-11-05 PROBLEM — R53.83 FATIGUE: Status: RESOLVED | Noted: 2023-08-02 | Resolved: 2024-11-05

## 2024-11-05 PROBLEM — B37.31 CANDIDAL VULVOVAGINITIS: Status: RESOLVED | Noted: 2023-08-02 | Resolved: 2024-11-05

## 2024-11-05 PROBLEM — R63.2 POLYPHAGIA: Status: RESOLVED | Noted: 2023-08-02 | Resolved: 2024-11-05

## 2024-11-05 PROBLEM — N92.0 MENORRHAGIA: Status: RESOLVED | Noted: 2023-08-02 | Resolved: 2024-11-05

## 2024-11-05 PROBLEM — N97.0 ANOVULAR MENSTRUATION: Status: RESOLVED | Noted: 2023-08-02 | Resolved: 2024-11-05

## 2024-11-06 DIAGNOSIS — M16.0 BILATERAL PRIMARY OSTEOARTHRITIS OF HIP: ICD-10-CM

## 2024-11-07 ENCOUNTER — PREP FOR PROCEDURE (OUTPATIENT)
Dept: ORTHOPEDIC SURGERY | Facility: HOSPITAL | Age: 44
End: 2024-11-07
Payer: COMMERCIAL

## 2024-11-07 DIAGNOSIS — M16.11 PRIMARY OSTEOARTHRITIS OF RIGHT HIP: Primary | ICD-10-CM

## 2024-11-07 DIAGNOSIS — Z01.818 PRE-OP TESTING: ICD-10-CM

## 2024-11-12 ENCOUNTER — PATIENT MESSAGE (OUTPATIENT)
Dept: ORTHOPEDIC SURGERY | Facility: HOSPITAL | Age: 44
End: 2024-11-12
Payer: COMMERCIAL

## 2024-11-12 DIAGNOSIS — Z96.641 S/P TOTAL RIGHT HIP ARTHROPLASTY: Primary | ICD-10-CM

## 2024-11-13 DIAGNOSIS — Z98.84 BARIATRIC SURGERY STATUS: ICD-10-CM

## 2024-11-13 DIAGNOSIS — E66.01 CLASS 3 SEVERE OBESITY DUE TO EXCESS CALORIES WITHOUT SERIOUS COMORBIDITY WITH BODY MASS INDEX (BMI) OF 50.0 TO 59.9 IN ADULT: ICD-10-CM

## 2024-11-13 DIAGNOSIS — E66.813 CLASS 3 SEVERE OBESITY DUE TO EXCESS CALORIES WITHOUT SERIOUS COMORBIDITY WITH BODY MASS INDEX (BMI) OF 50.0 TO 59.9 IN ADULT: ICD-10-CM

## 2024-11-13 DIAGNOSIS — E53.8 VITAMIN B12 DEFICIENCY: ICD-10-CM

## 2024-11-13 RX ORDER — FOLIC ACID 1 MG/1
TABLET ORAL
Qty: 90 TABLET | Refills: 0 | Status: SHIPPED | OUTPATIENT
Start: 2024-11-13

## 2024-11-13 RX ORDER — CYANOCOBALAMIN (VITAMIN B-12) 500 MCG
TABLET ORAL
Qty: 90 TABLET | Refills: 0 | Status: SHIPPED | OUTPATIENT
Start: 2024-11-13

## 2024-11-13 RX ORDER — MULTIVITAMIN
1 TABLET ORAL DAILY
Qty: 90 TABLET | Refills: 0 | Status: SHIPPED | OUTPATIENT
Start: 2024-11-13

## 2024-11-19 ENCOUNTER — CLINICAL SUPPORT (OUTPATIENT)
Dept: PREADMISSION TESTING | Facility: HOSPITAL | Age: 44
End: 2024-11-19
Payer: COMMERCIAL

## 2024-11-19 DIAGNOSIS — Z01.818 PREOPERATIVE CLEARANCE: Primary | ICD-10-CM

## 2024-11-19 DIAGNOSIS — Z41.9 SURGERY, ELECTIVE: Primary | ICD-10-CM

## 2024-11-19 DIAGNOSIS — Z01.818 PREOPERATIVE CLEARANCE: ICD-10-CM

## 2024-11-19 DIAGNOSIS — Z41.9 SURGERY, ELECTIVE: ICD-10-CM

## 2024-11-19 ASSESSMENT — ENCOUNTER SYMPTOMS
CARDIOVASCULAR NEGATIVE: 1
EYES NEGATIVE: 1
NEUROLOGICAL NEGATIVE: 1
RESPIRATORY NEGATIVE: 1
ARTHRALGIAS: 1
ENDOCRINE NEGATIVE: 1
GASTROINTESTINAL NEGATIVE: 1
CONSTITUTIONAL NEGATIVE: 1
NECK NEGATIVE: 1

## 2024-11-19 ASSESSMENT — DUKE ACTIVITY SCORE INDEX (DASI)
CAN YOU DO MODERATE WORK AROUND THE HOUSE LIKE VACUUMING, SWEEPING FLOORS OR CARRYING GROCERIES: YES
CAN YOU DO YARD WORK LIKE RAKING LEAVES, WEEDING OR PUSHING A MOWER: NO
CAN YOU CLIMB A FLIGHT OF STAIRS OR WALK UP A HILL: YES
CAN YOU HAVE SEXUAL RELATIONS: NO
CAN YOU DO LIGHT WORK AROUND THE HOUSE LIKE DUSTING OR WASHING DISHES: YES
CAN YOU TAKE CARE OF YOURSELF (EAT, DRESS, BATHE, OR USE TOILET): YES
CAN YOU PARTICIPATE IN MODERATE RECREATIONAL ACTIVITIES LIKE GOLF, BOWLING, DANCING, DOUBLES TENNIS OR THROWING A BASEBALL OR FOOTBALL: NO
CAN YOU WALK A BLOCK OR TWO ON LEVEL GROUND: NO
TOTAL_SCORE: 16.2
DASI METS SCORE: 4.7
CAN YOU WALK INDOORS, SUCH AS AROUND YOUR HOUSE: YES
CAN YOU DO HEAVY WORK AROUND THE HOUSE LIKE SCRUBBING FLOORS OR LIFTING AND MOVING HEAVY FURNITURE: NO
CAN YOU RUN A SHORT DISTANCE: NO
CAN YOU PARTICIPATE IN STRENOUS SPORTS LIKE SWIMMING, SINGLES TENNIS, FOOTBALL, BASKETBALL, OR SKIING: NO

## 2024-11-19 NOTE — PREPROCEDURE INSTRUCTIONS
No outpatient medications have been marked as taking for the 11/19/24 encounter (Clinical Support) with [facility]SE [facility].         This summary includes instructions and information to aid you during your perioperative period.  Please read carefully. If you have any questions about your visit today, please call the number listed above.  If you become ill or have any changes to your health before your surgery, please contact your primary care provider and alert your surgeon.    Please have you lab work completed before surgery at a  facility.    Preparing for your Surgery       Exercises  Preoperative Deep Breathing Exercises  Why it is important to do deep breathing exercises before my surgery?  Deep breathing exercises strengthen your breathing muscles.  This helps you to recover after your surgery and decreases the chance of breathing complications.  How are the deep breathing exercises done?  Sit straight with your back supported.  Breathe in deeply and slowly through your nose. Your lower rib cage should expand and your abdomen may move forward.  Hold that breath for 3 to 5 seconds.  Breathe out through pursed lips, slowly and completely.  Rest and repeat 10 times every hour while awake.  Rest longer if you become dizzy or lightheaded.      Preoperative Brain Exercises    What are brain exercises?  A brain exercise is any activity that engages your thinking (cognitive) skills.    What types of activities are considered brain exercises?  Jigsaw puzzles, crossword puzzles, word jumble, memory games, word search, and many more.  Many can be found free online or on your phone via a mobile liu.    Why should I do brain exercises before my surgery?  More recent research has shown brain exercise before surgery can lower the risk of postoperative delirium (confusion) which can be especially important for older adults.  Patients who did brain exercises for 5 to 10 hours the days before surgery, cut their risk of  postoperative delirium in half up to 1 week after surgery.    Sit-to-Stand Exercise    What is the sit-to-stand exercise?  The sit-to-stand exercise strengthens the muscles of your lower body and muscles in the center of your body (core muscles for stability) helping to maintain and improve your strength and mobility.  How do I do the sit-to-stand exercise?  The goal is to do this exercise without using your arms or hands.  If this is too difficult, use your arms and hands or a chair with armrests to help slowly push yourself to the standing position and lower yourself back to the sitting position. As the movement becomes easier use your arms and hands less.    Steps to the sit-to-stand exercise  Sit up tall in a sturdy chair, knees bent, feet flat on the floor shoulder-width apart.  Shift your hips/pelvis forward in the chair to correctly position yourself for the next movement.  Lean forward at your hips.  Stand up straight putting equal weight on both feet.  Check to be sure you are properly aligned with the chair, in a slow controlled movement sit back down.  Repeat this exercise 10-15 times.  If needed you can do it fewer times until your strength improves.  Rest for 1 minute.  Do another 10-15 sit-to-stand exercises.  Try to do this in the morning and evening.        Instructions    Preoperative Fasting Guidelines    Why must I stop eating and drinking near surgery time?  With sedation, food or liquid in your stomach can enter your lungs causing serious complications  Food can increase nausea and vomiting  When do I need to stop eating and drinking before my surgery?      Do not eat any food after midnight the night before your surgery/procedure. You may have up to 13.5 ounces of clear liquid until TWO hours before your instructed arrival time to the hospital.  This includes water, black tea/coffee, (no milk or cream) apple juice, and electrolyte drinks (Gatorade). You may chew gum until TWO hours before your  surgery/procedure            Simple things you can do to help prevent blood clots     Blood clots are blockages that can form in the body's veins. When a blood clot forms in your deep veins, it may be called a deep vein thrombosis, or DVT for short. Blood clots can happen in any part of the body where blood flows, but they are most common in the arms and legs. If a piece of a blood clot breaks free and travels to the lungs, it is called a pulmonary embolus (PE). A PE can be a very serious problem.         Being in the hospital or having surgery can raise your chances of getting a blood clot because you may not be well enough to move around as much as you normally do.         Ways you can help prevent blood clots in the hospital       Wearing SCDs  SCDs stands for Sequential Compression Devices.   SCDs are special sleeves that wrap around your legs. They attach to a pump that fills them with air to gently squeeze your legs every few minutes.  This helps return the blood in your legs to your heart.   SCDs should only be taken off when walking or bathing. SCDs may not be comfortable, but they can help save your life.              Pump SCD leg sleeves  Wearing compression stockings - if your doctor orders them. These special snug-fitting stockings gently squeeze your legs to help blood flow.       Walking. Walking helps move the blood in your legs.   If your doctor says it is ok, try walking the halls at least   5 times a day. Ask us to help you get up, so you don't fall.      Taking any blood-thinning medicines your doctor orders.              Ways you can help prevent blood clots at home         Wearing compression stockings - if your doctor orders them.   Walking - to help move the blood in your legs.    Taking any blood-thinning medicines your doctor orders.      Signs of a blood clot or PE    Tell your doctor or nurse right away if you have any of the problems listed below.         If you are at home, seek medical  care right away. Call 911 for chest pain or problems breathing.            Signs of a blood clot (DVT) - such as pain, swelling, redness, or warmth in your arm or legs.  Signs of a pulmonary embolism (PE) - such as chest pain or feeling short of breath      Tobacco and Alcohol;  Do not drink alcohol or smoke within 24 hours of surgery.  It is best to quit smoking for as long as possible before any surgery or procedure.   Other Instructions  Body Wash; What is a home preoperative antibacterial shower? This shower is a way of cleaning the skin with a germ-killing solution before surgery.  The solution contains chlorhexidine, commonly known as CHG.  CHG is a skin cleanser with germ-killing ability.  Let your doctor know if you are allergic to chlorhexidine. Why do I need to take a preoperative antibacterial shower? Skin is not sterile.  It is best to try to make your skin as free of germs as possible before surgery.  Proper cleansing with a germ-killing soap before surgery can lower the number of germs on your skin.  This helps to reduce the risk of infection at the surgical site.  Following the instructions listed below will help you prepare your skin for surgery.   How do I use the solution? Steps:  Begin using your CHG soap 5 days before your scheduled surgery on ___________.   First, wash and rinse your hair using the CHG soap. Keep CHG soap away from ear canals and eyes.  Rinse completely, do not condition.  Hair extensions should be removed.           The Week before Surgery        Seven days before Surgery  Check your CPM medication instructions  Do the exercises provided to you by CPM   Arrange for a responsible, adult licensed  to take you home after surgery and stay with you for 24 hours.  You will not be permitted to drive yourself home if you have received any anesthetic/sedation  Six days before surgery  Check your CPM medication instructions  Do the exercises provided to you by CPM   Start using  Chlorhexidene (CHG) body wash if prescribed  Five days before surgery  Check your CPM medication instructions  Do the exercises provided to you by CPM   Continue to use CHG body wash if prescribed  Three days before surgery  Check your CPM medication instructions  Do the exercises provided to you by CPM   Continue to use CHG body wash if prescribed  Two days before surgery  Check your CPM medication instructions  Do the exercises provided to you by CPM   Continue to use CHG body wash if prescribed    The Day before Surgery       Check your CPM medication and all other CPM instructions including when to stop eating and drinking  You will be called with your arrival time for surgery in the late afternoon.  If you do not receive a call please reach out to your surgeon's office.  Do not smoke or drink 24 hours before surgery  Prepare items to bring with you to the hospital  Shower with your chlorhexidine wash if prescribed  Brush your teeth and use your chlorhexidine dental rinse if prescribed    The Day of Surgery       Check your CPM medication instructions  Ensure you follow the instructions for when to stop eating and drinking  Shower, if prescribed use CHG.  Do not apply any lotions, creams, moisturizers, perfume or deodorant  Brush your teeth and use your CHG dental rinse if prescribed  Wear loose comfortable clothing  Avoid make-up  Remove  jewelry and piercings, consider professional piercing removal with a plastic spacer if needed  Bring photo ID and Insurance card  Bring an accurate medication list that includes medication dose, frequency and allergies  Bring a copy of your advanced directives (will, health care power of )  Bring any devices and controllers as well as medical devices you have been provided with for surgery (CPAP, slings, braces, etc.)  Dentures, eyeglasses, and contacts will be removed before surgery, please bring cases for contacts or glasses

## 2024-11-19 NOTE — CPM/PAT NURSE NOTE
"CPM/PAT Nurse Note      Name: Shoneia M Collins (Shoneia M Collins \"Tom\")  /Age: 1980/44 y.o.       Shoneia M Collins is scheduled for Arthroplasty Total Hip Anterior Approach - Right on 24  Past Medical History:   Diagnosis Date    Anemia     Arthritis     bilateral severe hip osteoarthritis    Bariatric surgery status     Benign hypertension 10/30/2018    Class 3 severe obesity due to excess calories without serious comorbidity with body mass index (BMI) of 50.0 to 59.9 in adult 2023    Encounter for pre-operative cardiovascular clearance 2023    Liam Doss MD For Bariatric Surgery    GERD (gastroesophageal reflux disease) 2023    Hidradenitis suppurativa     Hypertrophy of tonsils with hypertrophy of adenoids 2014    Tonsillar and adenoid hypertrophy    Personal history of gestational diabetes 2017    History of gestational diabetes mellitus (GDM)    Snoring 11/10/2014    Snoring    Thrombophlebitis 2023    Varicose veins with pain 2023    Vitamin B12 deficiency 2023    Vitamin D deficiency 2023       Past Surgical History:   Procedure Laterality Date    BARIATRIC SURGERY  2024    LAP RYGB WITH DR MYERS @ [facility]     SECTION, CLASSIC  11/10/2014     Section     SECTION, LOW TRANSVERSE      x2    CHOLECYSTECTOMY      GALLBLADDER SURGERY  11/10/2014    Gallbladder Surgery    HYSTERECTOMY      OTHER SURGICAL HISTORY  2021    Laparoscopic hysterectomy    OTHER SURGICAL HISTORY  2021    Bilateral salpingectomy    OTHER SURGICAL HISTORY  2021    Cystoscopy    TONSILLECTOMY      TUBAL LIGATION  2017    Tubal Ligation       Patient  reports being sexually active and has had partner(s) who are male. She reports using the following methods of birth control/protection: Female Sterilization and Condom Male.    Family History   Problem Relation Name Age of Onset    Lung disease Mother      Diabetes " Paternal Grandmother      Diabetes Other         Allergies   Allergen Reactions    Pollen Extracts Runny nose       Prior to Admission medications    Medication Sig Start Date End Date Taking? Authorizing Provider   calcium citrate-vitamin D3 500 mg-12.5 mcg (500 unit) tablet,chewable Chew 1,500 mg once daily. 9/4/24   CA Grace   chlorhexidine (Hibiclens) 4 % external liquid Use as a wash to affected areas once daily 9/10/24   Basil Kendall MD   clindamycin (Cleocin T) 1 % lotion Apply topically if needed (for HS). 9/4/24   CA Grace   diclofenac sodium (Voltaren) 1 % gel Apply 4.5 inches (4 g) topically 4 times a day. 9/4/24   CA Grace   dicyclomine (Bentyl) 10 mg capsule Take 1 capsule (10 mg) by mouth 4 times a day before meals. Take with meals and at bedtime 5/24/24 5/24/25  CA Perkins   doxycycline (Monodox) 100 mg capsule Take 1 capsule (100 mg) by mouth 2 times a day. Take with at least 8 ounces (large glass) of water, do not lie down for 30 minutes after 9/10/24 12/9/24  Basil Kendall MD   ferrous sulfate, 325 mg ferrous sulfate, tablet Take 1 tablet by mouth once daily. 4/9/24 4/9/25  CA Grace   Fiber Therapy, m-cellulose, 500 mg tablet  3/2/24   Historical Provider, MD   fluticasone (Flonase) 50 mcg/actuation nasal spray Administer into affected nostril(s) if needed. 6/30/22   Historical Provider, MD   folic acid (Folvite) 1 mg tablet TAKE ONE (1) TABLET (1 MG) BY MOUTH ONCE DAILY. 11/13/24   CA Grace   ketoconazole (NIZOral) 2 % cream Apply topically if needed.    Historical Provider, MD   lidocaine (Lidoderm) 5 % patch Place 1 patch over 12 hours on the skin once daily. Apply to painful area 12 hours per day, remove for 12 hours. 9/4/24 9/4/25  Oliva E Giovanni, APRN-CNP   loratadine (Claritin Reditabs) 10 mg disintegrating tablet Take 1 tablet (10 mg) by mouth once daily. 6/11/23   Historical Provider, MD   multivitamin  tablet TAKE ONE (1) TABLET BY MOUTH ONCE DAILY. 11/13/24   CA Grace   omeprazole (PriLOSEC) 40 mg DR capsule Take 1 capsule (40 mg) by mouth once daily in the morning. Take before meals. Do not crush or chew. Open capsule, sprinkle beads on SF jello, pudding or applesauce. 7/16/24   CA Pryor   tiZANidine (Zanaflex) 4 mg capsule Take 1 capsule (4 mg) by mouth once daily at bedtime. 11/14/23 11/13/24  CA Grace   Vitamin B-12 500 mcg tablet TAKE ONE (1) TABLET (500 MCG) BY MOUTH ONCE DAILY. 11/13/24   CA Grace   cyanocobalamin (Vitamin B-12) 500 mcg tablet Take 1 tablet (500 mcg) by mouth once daily.  Patient not taking: Reported on 5/10/2024 4/9/24 11/13/24  CA Grace   folic acid (Folvite) 1 mg tablet Take 1 tablet (1 mg) by mouth once daily.  Patient not taking: Reported on 5/10/2024 4/9/24 11/13/24  CA Grace   multivitamin tablet TAKE ONE (1) TABLET BY MOUTH ONCE DAILY. 6/27/24 11/13/24  CA Grace        PAT ROS:   Constitutional:   neg    Neuro/Psych:   neg    Eyes:   neg    Ears:   neg    Nose:   neg    Mouth:   neg    Throat:   neg    Neck:   neg    Cardio:   neg    Respiratory:   neg    Endocrine:   neg    GI:   neg    :   neg    Musculoskeletal:    arthralgias  Hematologic:   neg    Skin:  neg        DASI Risk Score      Flowsheet Row Clinical Support from 11/19/2024 in [facility] Questionnaire Series Submission from 3/18/2024 in [facility] with Generic Provider Juan   Can you take care of yourself (eat, dress, bathe, or use toilet)?  2.75 filed at 11/19/2024 0958 2.75  filed at 03/18/2024 1516   Can you walk indoors, such as around your house? 1.75 filed at 11/19/2024 0958 1.75  filed at 03/18/2024 1516   Can you walk a block or two on level ground?  0 filed at 11/19/2024 0958 2.75  filed at 03/18/2024 1516   Can you climb a flight of stairs or walk up a hill? 5.5 filed at 11/19/2024 0958 5.5   filed at 03/18/2024 1516   Can you run a short distance? 0 filed at 11/19/2024 0958 0  filed at 03/18/2024 1516   Can you do light work around the house like dusting or washing dishes? 2.7 filed at 11/19/2024 0958 2.7  filed at 03/18/2024 1516   Can you do moderate work around the house like vacuuming, sweeping floors or carrying groceries? 3.5 filed at 11/19/2024 0958 3.5  filed at 03/18/2024 1516   Can you do heavy work around the house like scrubbing floors or lifting and moving heavy furniture?  0 filed at 11/19/2024 0958 0  filed at 03/18/2024 1516   Can you do yard work like raking leaves, weeding or pushing a mower? 0 filed at 11/19/2024 0958 4.5  filed at 03/18/2024 1516   Can you have sexual relations? 0 filed at 11/19/2024 0958 5.25  filed at 03/18/2024 1516   Can you participate in moderate recreational activities like golf, bowling, dancing, doubles tennis or throwing a baseball or football? 0 filed at 11/19/2024 0958 6  filed at 03/18/2024 1516   Can you participate in strenous sports like swimming, singles tennis, football, basketball, or skiing? 0 filed at 11/19/2024 0958 0  filed at 03/18/2024 1516   DASI SCORE 16.2 filed at 11/19/2024 0958 34.7 filed at 03/18/2024 1516   METS Score (Will be calculated only when all the questions are answered) 4.7 filed at 11/19/2024 0958 7 filed at 03/18/2024 1516          Caprini DVT Assessment      Flowsheet Row Admission (Discharged) from 4/22/2024 in [facility] with Luis Armando Benito MD   DVT Score 7 filed at 04/22/2024 1228   BMI 41-50 (Morbid obesity) filed at 04/22/2024 1228   RETIRED: Current Status Major surgery planned, lasting 2-3 hours filed at 04/22/2024 1228   RETIRED: Age 40-59 years filed at 04/22/2024 1228          Modified Frailty Index    No data to display       CHADS2 Stroke Risk  Current as of about an hour ago        N/A 3 to 100%: High Risk   2 to < 3%: Medium Risk   0 to < 2%: Low Risk     Last Change: N/A          This  score determines the patient's risk of having a stroke if the patient has atrial fibrillation.        This score is not applicable to this patient. Components are not calculated.          Revised Cardiac Risk Index    No data to display       Apfel Simplified Score    No data to display       Risk Analysis Index Results This Encounter    No data found in the last 10 encounters.       Prodigy: High Risk  Total Score: 5              Prodigy SDB Score          ARISCAT Score for Postoperative Pulmonary Complications    No data to display       Robbins Perioperative Risk for Myocardial Infarction or Cardiac Arrest (MELINDA)    No data to display     Providers:                                                                                                           Vascular: Janeth Kern CNP 23 evaluation of left lower leg superficial phlebitis.       Cardiology: Liam Doss 23 pre-op clearance for weight loss surgery.       PCP: Oliva Davila CNP 24 presents for Follow-up.       Pulmonology: Marley Hayward CNP 23 Bariatric surgery clearance: no pulmonary symptoms   - will get CXR today      2. Snoring:   - make appt with sleep medicine      # Preoperative evaluation:  Patient is at increased risk of postoperative pulmonary complications given poor general health status (American Society of Anesthesiologists [ASA] class >2) and possible obstructive sleep apnea.  However this increased risk should not preclude the surgery.      Ortho: Marvin Allen 24 seen for bilateral severe hip osteoarthritis       Behavioral Health: Carli Perez 24 follow up s/p 4 month gastric bypass     GenSx: Courtney Addison CNP 10/15/24 post op follow up, s/p RYGB 2024.  Nearing goal weight for THR. Doing well with steady weight loss. Requests referral to plastics for evaluation HS and skin removal.          --TESTING:        - EK24  Normal sinus rhythm  Normal ECG        - Echo:  11/28/23  CONCLUSIONS:   1. Left ventricular systolic function is normal with a 55-60% estimated ejection fraction.   2. Trace mitral valve regurgitation.   3. Mild tricuspid regurgitation visualized.   4. Slightly elevated RVSP.   5. The estimated PASP is 36 mmHg.      - Vascular US: 12/01/22  IMPRESSION:  1. No sonographic evidence for deep vein thrombosis within the  evaluated veins of the right lower extremity.  2. Suboptimal visualization of the calf veins.    - CT A/P: 05/24/24  IMPRESSION:  Status post gastric bypass with moderate edematous wall thickening and  mucosal enhancement of the excluded portion of the stomach consistent  with postoperative gastritis in the appropriate clinical setting.  Sigmoid diverticulosis.  Severe age-related osteoarthritic change of the right hip.        _____________________________________________________________________________  Medication instructions:   Instructed to hold Vitamins, Supplements and Ibuprofen 7 days prior to surgery         Dolly Cerda LPN  Preadmission Testing        Nurse Plan of Action:   Patient chart reviewed with Dr. Child, medications instructions provided and sent via [employer] with AVS, instructed to complete Pre op labs.

## 2024-11-19 NOTE — SIGNIFICANT EVENT
11/19/24 0958   DASI Activity Score Index   Can you take care of yourself (eat, dress, bathe, or use toilet)?  2.75   Can you walk indoors, such as around your house? 1.75   Can you walk a block or two on level ground?  0   Can you climb a flight of stairs or walk up a hill? 5.5   Can you run a short distance? 0   Can you do light work around the house like dusting or washing dishes? 2.7   Can you do moderate work around the house like vacuuming, sweeping floors or carrying groceries? 3.5   Can you do heavy work around the house like scrubbing floors or lifting and moving heavy furniture?  0   Can you do yard work like raking leaves, weeding or pushing a mower? 0   Can you have sexual relations? 0   Can you participate in moderate recreational activities like golf, bowling, dancing, doubles tennis or throwing a baseball or football? 0   Can you participate in strenous sports like swimming, singles tennis, football, basketball, or skiing? 0   DASI SCORE 16.2   METS Score (Will be calculated only when all the questions are answered) 4.7

## 2024-11-21 DIAGNOSIS — L73.2 HIDRADENITIS SUPPURATIVA: ICD-10-CM

## 2024-11-21 DIAGNOSIS — M16.0 BILATERAL PRIMARY OSTEOARTHRITIS OF HIP: ICD-10-CM

## 2024-11-21 RX ORDER — CHLORHEXIDINE GLUCONATE ORAL RINSE 1.2 MG/ML
SOLUTION DENTAL
Qty: 15 ML | Refills: 0 | Status: SHIPPED | OUTPATIENT
Start: 2024-11-21

## 2024-11-21 RX ORDER — CHLORHEXIDINE GLUCONATE ORAL RINSE 1.2 MG/ML
SOLUTION DENTAL
Qty: 15 ML | Refills: 0 | Status: CANCELLED | OUTPATIENT
Start: 2024-11-21

## 2024-11-21 RX ORDER — CHLORHEXIDINE GLUCONATE 40 MG/ML
SOLUTION TOPICAL
Qty: 473 ML | Refills: 0 | Status: CANCELLED | OUTPATIENT
Start: 2024-11-21

## 2024-11-21 RX ORDER — CHLORHEXIDINE GLUCONATE 40 MG/ML
SOLUTION TOPICAL
Qty: 473 ML | Refills: 11 | Status: SHIPPED | OUTPATIENT
Start: 2024-11-21

## 2024-11-25 ENCOUNTER — LAB (OUTPATIENT)
Dept: LAB | Facility: LAB | Age: 44
End: 2024-11-25
Payer: COMMERCIAL

## 2024-11-25 DIAGNOSIS — M16.0 PRIMARY OSTEOARTHRITIS OF BOTH HIPS: ICD-10-CM

## 2024-11-25 DIAGNOSIS — D50.9 IRON DEFICIENCY ANEMIA, UNSPECIFIED IRON DEFICIENCY ANEMIA TYPE: ICD-10-CM

## 2024-11-25 DIAGNOSIS — E66.01 MORBID OBESITY DUE TO EXCESS CALORIES (MULTI): ICD-10-CM

## 2024-11-25 DIAGNOSIS — Z01.818 PREOPERATIVE CLEARANCE: ICD-10-CM

## 2024-11-25 DIAGNOSIS — E66.813 CLASS 3 SEVERE OBESITY DUE TO EXCESS CALORIES WITHOUT SERIOUS COMORBIDITY WITH BODY MASS INDEX (BMI) OF 50.0 TO 59.9 IN ADULT: ICD-10-CM

## 2024-11-25 DIAGNOSIS — E55.9 VITAMIN D DEFICIENCY: ICD-10-CM

## 2024-11-25 DIAGNOSIS — I10 BENIGN HYPERTENSION: ICD-10-CM

## 2024-11-25 DIAGNOSIS — E53.8 VITAMIN B12 DEFICIENCY: ICD-10-CM

## 2024-11-25 DIAGNOSIS — Z98.84 S/P GASTRIC BYPASS: ICD-10-CM

## 2024-11-25 DIAGNOSIS — K91.2 INTESTINAL MALABSORPTION FOLLOWING GASTRECTOMY (HHS-HCC): ICD-10-CM

## 2024-11-25 DIAGNOSIS — Z90.3 INTESTINAL MALABSORPTION FOLLOWING GASTRECTOMY (HHS-HCC): ICD-10-CM

## 2024-11-25 DIAGNOSIS — Z41.9 SURGERY, ELECTIVE: ICD-10-CM

## 2024-11-25 DIAGNOSIS — E66.01 CLASS 3 SEVERE OBESITY DUE TO EXCESS CALORIES WITHOUT SERIOUS COMORBIDITY WITH BODY MASS INDEX (BMI) OF 50.0 TO 59.9 IN ADULT: ICD-10-CM

## 2024-11-25 LAB
ABO GROUP (TYPE) IN BLOOD: NORMAL
AMPHETAMINES UR QL SCN: NORMAL
ANION GAP SERPL CALC-SCNC: 12 MMOL/L (ref 10–20)
ANTIBODY SCREEN: NORMAL
APTT PPP: 32 SECONDS (ref 27–38)
BARBITURATES UR QL SCN: NORMAL
BENZODIAZ UR QL SCN: NORMAL
BUN SERPL-MCNC: 8 MG/DL (ref 6–23)
BZE UR QL SCN: NORMAL
CALCIUM SERPL-MCNC: 9.4 MG/DL (ref 8.6–10.6)
CANNABINOIDS UR QL SCN: NORMAL
CHLORIDE SERPL-SCNC: 105 MMOL/L (ref 98–107)
CO2 SERPL-SCNC: 26 MMOL/L (ref 21–32)
CREAT SERPL-MCNC: 0.62 MG/DL (ref 0.5–1.05)
EGFRCR SERPLBLD CKD-EPI 2021: >90 ML/MIN/1.73M*2
ERYTHROCYTE [DISTWIDTH] IN BLOOD BY AUTOMATED COUNT: 16.6 % (ref 11.5–14.5)
FENTANYL+NORFENTANYL UR QL SCN: NORMAL
GLUCOSE SERPL-MCNC: 74 MG/DL (ref 74–99)
HCT VFR BLD AUTO: 37.3 % (ref 36–46)
HGB BLD-MCNC: 12.1 G/DL (ref 12–16)
INR PPP: 1 (ref 0.9–1.1)
MCH RBC QN AUTO: 24.3 PG (ref 26–34)
MCHC RBC AUTO-ENTMCNC: 32.4 G/DL (ref 32–36)
MCV RBC AUTO: 75 FL (ref 80–100)
METHADONE UR QL SCN: NORMAL
NRBC BLD-RTO: 0 /100 WBCS (ref 0–0)
OPIATES UR QL SCN: NORMAL
OXYCODONE+OXYMORPHONE UR QL SCN: NORMAL
PCP UR QL SCN: NORMAL
PLATELET # BLD AUTO: 284 X10*3/UL (ref 150–450)
POTASSIUM SERPL-SCNC: 4.2 MMOL/L (ref 3.5–5.3)
PROTHROMBIN TIME: 11.3 SECONDS (ref 9.8–12.8)
RBC # BLD AUTO: 4.97 X10*6/UL (ref 4–5.2)
RH FACTOR (ANTIGEN D): NORMAL
SODIUM SERPL-SCNC: 139 MMOL/L (ref 136–145)
WBC # BLD AUTO: 5.7 X10*3/UL (ref 4.4–11.3)

## 2024-11-25 PROCEDURE — 85027 COMPLETE CBC AUTOMATED: CPT

## 2024-11-25 PROCEDURE — 87081 CULTURE SCREEN ONLY: CPT

## 2024-11-25 PROCEDURE — 85730 THROMBOPLASTIN TIME PARTIAL: CPT

## 2024-11-25 PROCEDURE — 80307 DRUG TEST PRSMV CHEM ANLYZR: CPT

## 2024-11-25 PROCEDURE — 80048 BASIC METABOLIC PNL TOTAL CA: CPT

## 2024-11-25 PROCEDURE — 86900 BLOOD TYPING SEROLOGIC ABO: CPT | Performed by: ANESTHESIOLOGY

## 2024-11-25 PROCEDURE — 85610 PROTHROMBIN TIME: CPT

## 2024-11-25 PROCEDURE — 36415 COLL VENOUS BLD VENIPUNCTURE: CPT

## 2024-11-25 NOTE — PATIENT COMMUNICATION
She got her labs, urine and nasal swab completed this AM.     She just needs to have her walker brought to her on the day of surgery or brought to [facility] so she can pick it up, DME called and said it was at [facility].    Minda Naik LPN

## 2024-11-26 ENCOUNTER — ANESTHESIA EVENT (OUTPATIENT)
Dept: OPERATING ROOM | Facility: HOSPITAL | Age: 44
End: 2024-11-26
Payer: COMMERCIAL

## 2024-11-26 LAB — STAPHYLOCOCCUS SPEC CULT: NORMAL

## 2024-11-27 ENCOUNTER — ANESTHESIA (OUTPATIENT)
Dept: OPERATING ROOM | Facility: HOSPITAL | Age: 44
End: 2024-11-27
Payer: COMMERCIAL

## 2024-11-27 ENCOUNTER — DOCUMENTATION (OUTPATIENT)
Dept: HOME HEALTH SERVICES | Facility: HOME HEALTH | Age: 44
End: 2024-11-27

## 2024-11-27 ENCOUNTER — HOSPITAL ENCOUNTER (OUTPATIENT)
Facility: HOSPITAL | Age: 44
Discharge: HOME | End: 2024-11-28
Attending: ORTHOPAEDIC SURGERY | Admitting: ORTHOPAEDIC SURGERY
Payer: COMMERCIAL

## 2024-11-27 ENCOUNTER — HOME HEALTH ADMISSION (OUTPATIENT)
Dept: HOME HEALTH SERVICES | Facility: HOME HEALTH | Age: 44
End: 2024-11-27
Payer: COMMERCIAL

## 2024-11-27 ENCOUNTER — APPOINTMENT (OUTPATIENT)
Dept: RADIOLOGY | Facility: HOSPITAL | Age: 44
End: 2024-11-27
Payer: COMMERCIAL

## 2024-11-27 DIAGNOSIS — Z71.3 DIETARY COUNSELING: ICD-10-CM

## 2024-11-27 DIAGNOSIS — M16.11 PRIMARY OSTEOARTHRITIS OF RIGHT HIP: Primary | ICD-10-CM

## 2024-11-27 PROBLEM — Z96.641 STATUS POST HIP REPLACEMENT, RIGHT: Status: ACTIVE | Noted: 2024-11-27

## 2024-11-27 LAB
BB ANTIBODY IDENTIFICATION: NORMAL
CASE #: NORMAL
PATH REV-IMMUNOHEMATOLOGY-PR30: NORMAL

## 2024-11-27 PROCEDURE — 3600000018 HC OR TIME - INITIAL BASE CHARGE - PROCEDURE LEVEL SIX: Performed by: ORTHOPAEDIC SURGERY

## 2024-11-27 PROCEDURE — 2500000005 HC RX 250 GENERAL PHARMACY W/O HCPCS: Mod: SE | Performed by: ANESTHESIOLOGIST ASSISTANT

## 2024-11-27 PROCEDURE — 97110 THERAPEUTIC EXERCISES: CPT | Mod: GP | Performed by: PHYSICAL THERAPIST

## 2024-11-27 PROCEDURE — 2500000001 HC RX 250 WO HCPCS SELF ADMINISTERED DRUGS (ALT 637 FOR MEDICARE OP): Mod: SE | Performed by: ANESTHESIOLOGY

## 2024-11-27 PROCEDURE — 3700000001 HC GENERAL ANESTHESIA TIME - INITIAL BASE CHARGE: Performed by: ORTHOPAEDIC SURGERY

## 2024-11-27 PROCEDURE — 97161 PT EVAL LOW COMPLEX 20 MIN: CPT | Mod: GP | Performed by: PHYSICAL THERAPIST

## 2024-11-27 PROCEDURE — 72170 X-RAY EXAM OF PELVIS: CPT | Performed by: RADIOLOGY

## 2024-11-27 PROCEDURE — 2500000001 HC RX 250 WO HCPCS SELF ADMINISTERED DRUGS (ALT 637 FOR MEDICARE OP): Mod: SE

## 2024-11-27 PROCEDURE — RXMED WILLOW AMBULATORY MEDICATION CHARGE

## 2024-11-27 PROCEDURE — 7100000011 HC EXTENDED STAY RECOVERY HOURLY - NURSING UNIT

## 2024-11-27 PROCEDURE — A27130 PR TOTAL HIP ARTHROPLASTY: Performed by: ANESTHESIOLOGIST ASSISTANT

## 2024-11-27 PROCEDURE — 27130 TOTAL HIP ARTHROPLASTY: CPT

## 2024-11-27 PROCEDURE — 3600000017 HC OR TIME - EACH INCREMENTAL 1 MINUTE - PROCEDURE LEVEL SIX: Performed by: ORTHOPAEDIC SURGERY

## 2024-11-27 PROCEDURE — 27130 TOTAL HIP ARTHROPLASTY: CPT | Performed by: ORTHOPAEDIC SURGERY

## 2024-11-27 PROCEDURE — 2500000004 HC RX 250 GENERAL PHARMACY W/ HCPCS (ALT 636 FOR OP/ED): Mod: SE | Performed by: ANESTHESIOLOGIST ASSISTANT

## 2024-11-27 PROCEDURE — A6213 FOAM DRG >16<=48 SQ IN W/BDR: HCPCS | Performed by: ORTHOPAEDIC SURGERY

## 2024-11-27 PROCEDURE — C1713 ANCHOR/SCREW BN/BN,TIS/BN: HCPCS | Performed by: ORTHOPAEDIC SURGERY

## 2024-11-27 PROCEDURE — C1776 JOINT DEVICE (IMPLANTABLE): HCPCS | Performed by: ORTHOPAEDIC SURGERY

## 2024-11-27 PROCEDURE — 2500000004 HC RX 250 GENERAL PHARMACY W/ HCPCS (ALT 636 FOR OP/ED): Mod: JZ,SE

## 2024-11-27 PROCEDURE — A27130 PR TOTAL HIP ARTHROPLASTY: Performed by: ANESTHESIOLOGY

## 2024-11-27 PROCEDURE — 2500000005 HC RX 250 GENERAL PHARMACY W/O HCPCS: Mod: SE | Performed by: ORTHOPAEDIC SURGERY

## 2024-11-27 PROCEDURE — 99255 IP/OBS CONSLTJ NEW/EST HI 80: CPT

## 2024-11-27 PROCEDURE — 2500000004 HC RX 250 GENERAL PHARMACY W/ HCPCS (ALT 636 FOR OP/ED): Mod: SE | Performed by: ORTHOPAEDIC SURGERY

## 2024-11-27 PROCEDURE — 2720000007 HC OR 272 NO HCPCS: Performed by: ORTHOPAEDIC SURGERY

## 2024-11-27 PROCEDURE — 72170 X-RAY EXAM OF PELVIS: CPT

## 2024-11-27 PROCEDURE — 64999 UNLISTED PX NERVOUS SYSTEM: CPT | Performed by: STUDENT IN AN ORGANIZED HEALTH CARE EDUCATION/TRAINING PROGRAM

## 2024-11-27 PROCEDURE — 2500000004 HC RX 250 GENERAL PHARMACY W/ HCPCS (ALT 636 FOR OP/ED): Mod: SE | Performed by: STUDENT IN AN ORGANIZED HEALTH CARE EDUCATION/TRAINING PROGRAM

## 2024-11-27 PROCEDURE — 7100000001 HC RECOVERY ROOM TIME - INITIAL BASE CHARGE: Performed by: ORTHOPAEDIC SURGERY

## 2024-11-27 PROCEDURE — 3700000002 HC GENERAL ANESTHESIA TIME - EACH INCREMENTAL 1 MINUTE: Performed by: ORTHOPAEDIC SURGERY

## 2024-11-27 PROCEDURE — 7100000002 HC RECOVERY ROOM TIME - EACH INCREMENTAL 1 MINUTE: Performed by: ORTHOPAEDIC SURGERY

## 2024-11-27 PROCEDURE — 2500000005 HC RX 250 GENERAL PHARMACY W/O HCPCS: Mod: SE | Performed by: ANESTHESIOLOGY

## 2024-11-27 PROCEDURE — 2780000003 HC OR 278 NO HCPCS: Performed by: ORTHOPAEDIC SURGERY

## 2024-11-27 PROCEDURE — 2500000004 HC RX 250 GENERAL PHARMACY W/ HCPCS (ALT 636 FOR OP/ED): Mod: SE | Performed by: ANESTHESIOLOGY

## 2024-11-27 DEVICE — SCREW CANCELLOUS 6.5 X 20: Type: IMPLANTABLE DEVICE | Site: HIP | Status: FUNCTIONAL

## 2024-11-27 DEVICE — LINER, ALTRX, NEURTAL, 36 X 52MM: Type: IMPLANTABLE DEVICE | Site: HIP | Status: FUNCTIONAL

## 2024-11-27 DEVICE — IMPLANTABLE DEVICE: Type: IMPLANTABLE DEVICE | Site: HIP | Status: FUNCTIONAL

## 2024-11-27 DEVICE — FEMORAL HEAD, CERAMIC 36 +1.5: Type: IMPLANTABLE DEVICE | Site: HIP | Status: FUNCTIONAL

## 2024-11-27 DEVICE — ACETABULAR CUP, SECTOR, GRIPTON, SIZE 52MM: Type: IMPLANTABLE DEVICE | Site: HIP | Status: FUNCTIONAL

## 2024-11-27 RX ORDER — ONDANSETRON 4 MG/1
4 TABLET, FILM COATED ORAL EVERY 8 HOURS PRN
Status: DISCONTINUED | OUTPATIENT
Start: 2024-11-27 | End: 2024-11-28 | Stop reason: HOSPADM

## 2024-11-27 RX ORDER — KETOCONAZOLE 20 MG/G
CREAM TOPICAL AS NEEDED
Status: DISCONTINUED | OUTPATIENT
Start: 2024-11-27 | End: 2024-11-28 | Stop reason: HOSPADM

## 2024-11-27 RX ORDER — ASPIRIN 81 MG/1
81 TABLET ORAL 2 TIMES DAILY
Status: DISCONTINUED | OUTPATIENT
Start: 2024-11-28 | End: 2024-11-28 | Stop reason: HOSPADM

## 2024-11-27 RX ORDER — CYCLOBENZAPRINE HCL 5 MG
5 TABLET ORAL 3 TIMES DAILY PRN
Status: DISCONTINUED | OUTPATIENT
Start: 2024-11-27 | End: 2024-11-27

## 2024-11-27 RX ORDER — FOLIC ACID 1 MG/1
1 TABLET ORAL DAILY
Status: DISCONTINUED | OUTPATIENT
Start: 2024-11-27 | End: 2024-11-28 | Stop reason: HOSPADM

## 2024-11-27 RX ORDER — TIZANIDINE 2 MG/1
2 TABLET ORAL EVERY 6 HOURS PRN
Status: DISCONTINUED | OUTPATIENT
Start: 2024-11-27 | End: 2024-11-28 | Stop reason: HOSPADM

## 2024-11-27 RX ORDER — TRANEXAMIC ACID 650 MG/1
1950 TABLET ORAL ONCE
Status: CANCELLED | OUTPATIENT
Start: 2024-11-27 | End: 2024-11-27

## 2024-11-27 RX ORDER — MIDAZOLAM HYDROCHLORIDE 1 MG/ML
INJECTION INTRAMUSCULAR; INTRAVENOUS AS NEEDED
Status: DISCONTINUED | OUTPATIENT
Start: 2024-11-27 | End: 2024-11-27

## 2024-11-27 RX ORDER — PROPOFOL 10 MG/ML
INJECTION, EMULSION INTRAVENOUS AS NEEDED
Status: DISCONTINUED | OUTPATIENT
Start: 2024-11-27 | End: 2024-11-27

## 2024-11-27 RX ORDER — ONDANSETRON HYDROCHLORIDE 2 MG/ML
INJECTION, SOLUTION INTRAVENOUS AS NEEDED
Status: DISCONTINUED | OUTPATIENT
Start: 2024-11-27 | End: 2024-11-27

## 2024-11-27 RX ORDER — NORETHINDRONE AND ETHINYL ESTRADIOL 0.5-0.035
KIT ORAL AS NEEDED
Status: DISCONTINUED | OUTPATIENT
Start: 2024-11-27 | End: 2024-11-27

## 2024-11-27 RX ORDER — BUPIVACAINE HYDROCHLORIDE 7.5 MG/ML
INJECTION, SOLUTION EPIDURAL; RETROBULBAR AS NEEDED
Status: DISCONTINUED | OUTPATIENT
Start: 2024-11-27 | End: 2024-11-27

## 2024-11-27 RX ORDER — POLYETHYLENE GLYCOL 3350 17 G/17G
17 POWDER, FOR SOLUTION ORAL DAILY
Qty: 30 PACKET | Refills: 0 | Status: SHIPPED | OUTPATIENT
Start: 2024-11-27 | End: 2024-12-28

## 2024-11-27 RX ORDER — METOCLOPRAMIDE HYDROCHLORIDE 5 MG/ML
10 INJECTION INTRAMUSCULAR; INTRAVENOUS EVERY 6 HOURS PRN
Status: DISCONTINUED | OUTPATIENT
Start: 2024-11-27 | End: 2024-11-28 | Stop reason: HOSPADM

## 2024-11-27 RX ORDER — DIPHENHYDRAMINE HCL 12.5MG/5ML
12.5 LIQUID (ML) ORAL EVERY 6 HOURS PRN
Status: DISCONTINUED | OUTPATIENT
Start: 2024-11-27 | End: 2024-11-28 | Stop reason: HOSPADM

## 2024-11-27 RX ORDER — HYDROMORPHONE HYDROCHLORIDE 0.2 MG/ML
0.2 INJECTION INTRAMUSCULAR; INTRAVENOUS; SUBCUTANEOUS EVERY 5 MIN PRN
Status: DISCONTINUED | OUTPATIENT
Start: 2024-11-27 | End: 2024-11-27 | Stop reason: HOSPADM

## 2024-11-27 RX ORDER — OXYCODONE HYDROCHLORIDE 5 MG/1
5 TABLET ORAL EVERY 4 HOURS PRN
Status: DISCONTINUED | OUTPATIENT
Start: 2024-11-27 | End: 2024-11-27 | Stop reason: HOSPADM

## 2024-11-27 RX ORDER — LIDOCAINE HCL/PF 100 MG/5ML
SYRINGE (ML) INTRAVENOUS AS NEEDED
Status: DISCONTINUED | OUTPATIENT
Start: 2024-11-27 | End: 2024-11-27

## 2024-11-27 RX ORDER — PNV NO.95/FERROUS FUM/FOLIC AC 28MG-0.8MG
50 TABLET ORAL DAILY
Status: DISCONTINUED | OUTPATIENT
Start: 2024-11-27 | End: 2024-11-28 | Stop reason: HOSPADM

## 2024-11-27 RX ORDER — PANTOPRAZOLE SODIUM 40 MG/1
40 TABLET, DELAYED RELEASE ORAL
Status: DISCONTINUED | OUTPATIENT
Start: 2024-11-28 | End: 2024-11-28 | Stop reason: HOSPADM

## 2024-11-27 RX ORDER — CEFAZOLIN 1 G/1
INJECTION, POWDER, FOR SOLUTION INTRAVENOUS AS NEEDED
Status: DISCONTINUED | OUTPATIENT
Start: 2024-11-27 | End: 2024-11-27

## 2024-11-27 RX ORDER — FERROUS SULFATE, DRIED 160(50) MG
1 TABLET, EXTENDED RELEASE ORAL DAILY
Status: DISCONTINUED | OUTPATIENT
Start: 2024-11-27 | End: 2024-11-28 | Stop reason: HOSPADM

## 2024-11-27 RX ORDER — PANTOPRAZOLE SODIUM 40 MG/1
40 TABLET, DELAYED RELEASE ORAL DAILY
Qty: 30 TABLET | Refills: 0 | Status: SHIPPED | OUTPATIENT
Start: 2024-11-27 | End: 2024-12-28

## 2024-11-27 RX ORDER — ONDANSETRON HYDROCHLORIDE 2 MG/ML
4 INJECTION, SOLUTION INTRAVENOUS ONCE AS NEEDED
Status: DISCONTINUED | OUTPATIENT
Start: 2024-11-27 | End: 2024-11-27 | Stop reason: HOSPADM

## 2024-11-27 RX ORDER — LABETALOL HYDROCHLORIDE 5 MG/ML
5 INJECTION, SOLUTION INTRAVENOUS ONCE AS NEEDED
Status: DISCONTINUED | OUTPATIENT
Start: 2024-11-27 | End: 2024-11-27 | Stop reason: HOSPADM

## 2024-11-27 RX ORDER — ALBUTEROL SULFATE 0.83 MG/ML
2.5 SOLUTION RESPIRATORY (INHALATION) ONCE AS NEEDED
Status: DISCONTINUED | OUTPATIENT
Start: 2024-11-27 | End: 2024-11-27 | Stop reason: HOSPADM

## 2024-11-27 RX ORDER — METOCLOPRAMIDE 10 MG/1
10 TABLET ORAL EVERY 6 HOURS PRN
Status: DISCONTINUED | OUTPATIENT
Start: 2024-11-27 | End: 2024-11-28 | Stop reason: HOSPADM

## 2024-11-27 RX ORDER — ACETAMINOPHEN 325 MG/1
650 TABLET ORAL EVERY 6 HOURS SCHEDULED
Status: DISCONTINUED | OUTPATIENT
Start: 2024-11-27 | End: 2024-11-28 | Stop reason: HOSPADM

## 2024-11-27 RX ORDER — TRAMADOL HYDROCHLORIDE 50 MG/1
50 TABLET ORAL EVERY 6 HOURS PRN
Qty: 28 TABLET | Refills: 0 | Status: SHIPPED | OUTPATIENT
Start: 2024-11-27 | End: 2024-12-05

## 2024-11-27 RX ORDER — OXYCODONE HYDROCHLORIDE 5 MG/1
10 TABLET ORAL EVERY 4 HOURS PRN
Status: DISCONTINUED | OUTPATIENT
Start: 2024-11-27 | End: 2024-11-28 | Stop reason: HOSPADM

## 2024-11-27 RX ORDER — ASPIRIN 81 MG/1
81 TABLET ORAL 2 TIMES DAILY
Qty: 60 TABLET | Refills: 0 | Status: SHIPPED | OUTPATIENT
Start: 2024-11-27 | End: 2024-12-28

## 2024-11-27 RX ORDER — PHENYLEPHRINE HCL IN 0.9% NACL 0.4MG/10ML
SYRINGE (ML) INTRAVENOUS AS NEEDED
Status: DISCONTINUED | OUTPATIENT
Start: 2024-11-27 | End: 2024-11-27

## 2024-11-27 RX ORDER — ONDANSETRON HYDROCHLORIDE 2 MG/ML
4 INJECTION, SOLUTION INTRAVENOUS EVERY 8 HOURS PRN
Status: DISCONTINUED | OUTPATIENT
Start: 2024-11-27 | End: 2024-11-28 | Stop reason: HOSPADM

## 2024-11-27 RX ORDER — DOCUSATE SODIUM 100 MG/1
100 CAPSULE, LIQUID FILLED ORAL 2 TIMES DAILY
Status: DISCONTINUED | OUTPATIENT
Start: 2024-11-27 | End: 2024-11-28 | Stop reason: HOSPADM

## 2024-11-27 RX ORDER — GABAPENTIN 300 MG/1
300 CAPSULE ORAL ONCE
Status: COMPLETED | OUTPATIENT
Start: 2024-11-27 | End: 2024-11-27

## 2024-11-27 RX ORDER — CEFAZOLIN SODIUM 2 G/100ML
2 INJECTION, SOLUTION INTRAVENOUS EVERY 8 HOURS
Status: COMPLETED | OUTPATIENT
Start: 2024-11-27 | End: 2024-11-28

## 2024-11-27 RX ORDER — BISACODYL 10 MG/1
10 SUPPOSITORY RECTAL DAILY PRN
Status: DISCONTINUED | OUTPATIENT
Start: 2024-11-27 | End: 2024-11-28 | Stop reason: HOSPADM

## 2024-11-27 RX ORDER — ACETAMINOPHEN 325 MG/1
975 TABLET ORAL ONCE
Status: COMPLETED | OUTPATIENT
Start: 2024-11-27 | End: 2024-11-27

## 2024-11-27 RX ORDER — DEXMEDETOMIDINE IN 0.9 % NACL 20 MCG/5ML
SYRINGE (ML) INTRAVENOUS AS NEEDED
Status: DISCONTINUED | OUTPATIENT
Start: 2024-11-27 | End: 2024-11-27

## 2024-11-27 RX ORDER — PHENYLEPHRINE 10 MG/250 ML(40 MCG/ML)IN 0.9 % SOD.CHLORIDE INTRAVENOUS
CONTINUOUS PRN
Status: DISCONTINUED | OUTPATIENT
Start: 2024-11-27 | End: 2024-11-27

## 2024-11-27 RX ORDER — NALOXONE HYDROCHLORIDE 0.4 MG/ML
0.2 INJECTION, SOLUTION INTRAMUSCULAR; INTRAVENOUS; SUBCUTANEOUS EVERY 5 MIN PRN
Status: DISCONTINUED | OUTPATIENT
Start: 2024-11-27 | End: 2024-11-28 | Stop reason: HOSPADM

## 2024-11-27 RX ORDER — DOCUSATE SODIUM 100 MG/1
100 CAPSULE, LIQUID FILLED ORAL 2 TIMES DAILY
Qty: 60 CAPSULE | Refills: 0 | Status: SHIPPED | OUTPATIENT
Start: 2024-11-27 | End: 2024-12-28

## 2024-11-27 RX ORDER — SCOLOPAMINE TRANSDERMAL SYSTEM 1 MG/1
1 PATCH, EXTENDED RELEASE TRANSDERMAL ONCE
Status: DISCONTINUED | OUTPATIENT
Start: 2024-11-27 | End: 2024-11-28 | Stop reason: HOSPADM

## 2024-11-27 RX ORDER — KETOROLAC TROMETHAMINE 10 MG/1
10 TABLET, FILM COATED ORAL EVERY 6 HOURS PRN
Qty: 12 TABLET | Refills: 0 | Status: SHIPPED | OUTPATIENT
Start: 2024-11-27 | End: 2024-12-01

## 2024-11-27 RX ORDER — ROPIVACAINE HYDROCHLORIDE 5 MG/ML
INJECTION, SOLUTION EPIDURAL; INFILTRATION; PERINEURAL AS NEEDED
Status: DISCONTINUED | OUTPATIENT
Start: 2024-11-27 | End: 2024-11-27

## 2024-11-27 RX ORDER — POLYETHYLENE GLYCOL 3350 17 G/17G
17 POWDER, FOR SOLUTION ORAL DAILY
Status: DISCONTINUED | OUTPATIENT
Start: 2024-11-27 | End: 2024-11-28 | Stop reason: HOSPADM

## 2024-11-27 RX ORDER — ADHESIVE BANDAGE
30 BANDAGE TOPICAL DAILY PRN
Status: DISCONTINUED | OUTPATIENT
Start: 2024-11-27 | End: 2024-11-28 | Stop reason: HOSPADM

## 2024-11-27 RX ORDER — ACETAMINOPHEN 500 MG
1000 TABLET ORAL EVERY 6 HOURS PRN
Qty: 240 TABLET | Refills: 0 | Status: SHIPPED | OUTPATIENT
Start: 2024-11-27 | End: 2024-12-28

## 2024-11-27 RX ORDER — OXYCODONE HYDROCHLORIDE 5 MG/1
5 TABLET ORAL EVERY 6 HOURS PRN
Qty: 28 TABLET | Refills: 0 | Status: SHIPPED | OUTPATIENT
Start: 2024-11-27 | End: 2024-12-05

## 2024-11-27 RX ORDER — FENTANYL CITRATE 50 UG/ML
INJECTION, SOLUTION INTRAMUSCULAR; INTRAVENOUS AS NEEDED
Status: DISCONTINUED | OUTPATIENT
Start: 2024-11-27 | End: 2024-11-27

## 2024-11-27 RX ORDER — TRANEXAMIC ACID 100 MG/ML
INJECTION, SOLUTION INTRAVENOUS AS NEEDED
Status: DISCONTINUED | OUTPATIENT
Start: 2024-11-27 | End: 2024-11-27

## 2024-11-27 RX ORDER — OXYCODONE HYDROCHLORIDE 5 MG/1
5 TABLET ORAL EVERY 4 HOURS PRN
Status: DISCONTINUED | OUTPATIENT
Start: 2024-11-27 | End: 2024-11-28 | Stop reason: HOSPADM

## 2024-11-27 RX ORDER — LIDOCAINE HYDROCHLORIDE 10 MG/ML
0.1 INJECTION, SOLUTION INFILTRATION; PERINEURAL ONCE
Status: DISCONTINUED | OUTPATIENT
Start: 2024-11-27 | End: 2024-11-27 | Stop reason: HOSPADM

## 2024-11-27 RX ORDER — VANCOMYCIN HYDROCHLORIDE 1 G/20ML
INJECTION, POWDER, LYOPHILIZED, FOR SOLUTION INTRAVENOUS AS NEEDED
Status: DISCONTINUED | OUTPATIENT
Start: 2024-11-27 | End: 2024-11-27 | Stop reason: HOSPADM

## 2024-11-27 SDOH — SOCIAL STABILITY: SOCIAL INSECURITY: WITHIN THE LAST YEAR, HAVE YOU BEEN HUMILIATED OR EMOTIONALLY ABUSED IN OTHER WAYS BY YOUR PARTNER OR EX-PARTNER?: NO

## 2024-11-27 SDOH — SOCIAL STABILITY: SOCIAL INSECURITY: HAVE YOU HAD ANY THOUGHTS OF HARMING ANYONE ELSE?: NO

## 2024-11-27 SDOH — SOCIAL STABILITY: SOCIAL INSECURITY
WITHIN THE LAST YEAR, HAVE YOU BEEN RAPED OR FORCED TO HAVE ANY KIND OF SEXUAL ACTIVITY BY YOUR PARTNER OR EX-PARTNER?: NO

## 2024-11-27 SDOH — SOCIAL STABILITY: SOCIAL INSECURITY: ARE YOU OR HAVE YOU BEEN THREATENED OR ABUSED PHYSICALLY, EMOTIONALLY, OR SEXUALLY BY ANYONE?: NO

## 2024-11-27 SDOH — SOCIAL STABILITY: SOCIAL INSECURITY: WERE YOU ABLE TO COMPLETE ALL THE BEHAVIORAL HEALTH SCREENINGS?: YES

## 2024-11-27 SDOH — SOCIAL STABILITY: SOCIAL INSECURITY: ARE THERE ANY APPARENT SIGNS OF INJURIES/BEHAVIORS THAT COULD BE RELATED TO ABUSE/NEGLECT?: NO

## 2024-11-27 SDOH — ECONOMIC STABILITY: FOOD INSECURITY: WITHIN THE PAST 12 MONTHS, THE FOOD YOU BOUGHT JUST DIDN'T LAST AND YOU DIDN'T HAVE MONEY TO GET MORE.: SOMETIMES TRUE

## 2024-11-27 SDOH — SOCIAL STABILITY: SOCIAL INSECURITY
WITHIN THE LAST YEAR, HAVE YOU BEEN KICKED, HIT, SLAPPED, OR OTHERWISE PHYSICALLY HURT BY YOUR PARTNER OR EX-PARTNER?: NO

## 2024-11-27 SDOH — SOCIAL STABILITY: SOCIAL INSECURITY: HAS ANYONE EVER THREATENED TO HURT YOUR FAMILY OR YOUR PETS?: NO

## 2024-11-27 SDOH — ECONOMIC STABILITY: INCOME INSECURITY: IN THE PAST 12 MONTHS HAS THE ELECTRIC, GAS, OIL, OR WATER COMPANY THREATENED TO SHUT OFF SERVICES IN YOUR HOME?: YES

## 2024-11-27 SDOH — SOCIAL STABILITY: SOCIAL INSECURITY: WITHIN THE LAST YEAR, HAVE YOU BEEN AFRAID OF YOUR PARTNER OR EX-PARTNER?: NO

## 2024-11-27 SDOH — SOCIAL STABILITY: SOCIAL INSECURITY: DO YOU FEEL ANYONE HAS EXPLOITED OR TAKEN ADVANTAGE OF YOU FINANCIALLY OR OF YOUR PERSONAL PROPERTY?: NO

## 2024-11-27 SDOH — SOCIAL STABILITY: SOCIAL INSECURITY: HAVE YOU HAD THOUGHTS OF HARMING ANYONE ELSE?: NO

## 2024-11-27 SDOH — ECONOMIC STABILITY: FOOD INSECURITY
WITHIN THE PAST 12 MONTHS, YOU WORRIED THAT YOUR FOOD WOULD RUN OUT BEFORE YOU GOT THE MONEY TO BUY MORE.: SOMETIMES TRUE

## 2024-11-27 SDOH — SOCIAL STABILITY: SOCIAL INSECURITY: DOES ANYONE TRY TO KEEP YOU FROM HAVING/CONTACTING OTHER FRIENDS OR DOING THINGS OUTSIDE YOUR HOME?: NO

## 2024-11-27 SDOH — SOCIAL STABILITY: SOCIAL INSECURITY: ABUSE: ADULT

## 2024-11-27 SDOH — HEALTH STABILITY: MENTAL HEALTH: CURRENT SMOKER: 0

## 2024-11-27 SDOH — SOCIAL STABILITY: SOCIAL INSECURITY: DO YOU FEEL UNSAFE GOING BACK TO THE PLACE WHERE YOU ARE LIVING?: NO

## 2024-11-27 ASSESSMENT — PAIN SCALES - GENERAL
PAINLEVEL_OUTOF10: 6
PAINLEVEL_OUTOF10: 4
PAINLEVEL_OUTOF10: 2
PAINLEVEL_OUTOF10: 3
PAINLEVEL_OUTOF10: 7
PAINLEVEL_OUTOF10: 10 - WORST POSSIBLE PAIN
PAINLEVEL_OUTOF10: 2
PAINLEVEL_OUTOF10: 4
PAINLEVEL_OUTOF10: 7
PAINLEVEL_OUTOF10: 7

## 2024-11-27 ASSESSMENT — PATIENT HEALTH QUESTIONNAIRE - PHQ9
SUM OF ALL RESPONSES TO PHQ9 QUESTIONS 1 & 2: 0
1. LITTLE INTEREST OR PLEASURE IN DOING THINGS: NOT AT ALL
2. FEELING DOWN, DEPRESSED OR HOPELESS: NOT AT ALL

## 2024-11-27 ASSESSMENT — COGNITIVE AND FUNCTIONAL STATUS - GENERAL
MOVING TO AND FROM BED TO CHAIR: A LITTLE
CLIMB 3 TO 5 STEPS WITH RAILING: TOTAL
DRESSING REGULAR LOWER BODY CLOTHING: A LITTLE
DAILY ACTIVITIY SCORE: 23
PATIENT BASELINE BEDBOUND: NO
STANDING UP FROM CHAIR USING ARMS: A LITTLE
MOBILITY SCORE: 23
TURNING FROM BACK TO SIDE WHILE IN FLAT BAD: A LITTLE
MOBILITY SCORE: 16
CLIMB 3 TO 5 STEPS WITH RAILING: A LITTLE
WALKING IN HOSPITAL ROOM: A LITTLE
MOVING FROM LYING ON BACK TO SITTING ON SIDE OF FLAT BED WITH BEDRAILS: A LITTLE

## 2024-11-27 ASSESSMENT — ACTIVITIES OF DAILY LIVING (ADL)
JUDGMENT_ADEQUATE_SAFELY_COMPLETE_DAILY_ACTIVITIES: YES
LACK_OF_TRANSPORTATION: YES
FEEDING YOURSELF: INDEPENDENT
TOILETING: INDEPENDENT
ADLS_ADDRESSED: NO
ADL_ASSISTANCE: INDEPENDENT
HEARING - RIGHT EAR: FUNCTIONAL
GROOMING: INDEPENDENT
WALKS IN HOME: INDEPENDENT
DRESSING YOURSELF: NEEDS ASSISTANCE
ADEQUATE_TO_COMPLETE_ADL: YES
HEARING - LEFT EAR: FUNCTIONAL
PATIENT'S MEMORY ADEQUATE TO SAFELY COMPLETE DAILY ACTIVITIES?: YES
BATHING: INDEPENDENT

## 2024-11-27 ASSESSMENT — PAIN - FUNCTIONAL ASSESSMENT
PAIN_FUNCTIONAL_ASSESSMENT: 0-10
PAIN_FUNCTIONAL_ASSESSMENT: UNABLE TO SELF-REPORT
PAIN_FUNCTIONAL_ASSESSMENT: UNABLE TO SELF-REPORT
PAIN_FUNCTIONAL_ASSESSMENT: 0-10

## 2024-11-27 ASSESSMENT — PAIN DESCRIPTION - ORIENTATION: ORIENTATION: RIGHT

## 2024-11-27 ASSESSMENT — COLUMBIA-SUICIDE SEVERITY RATING SCALE - C-SSRS
6. HAVE YOU EVER DONE ANYTHING, STARTED TO DO ANYTHING, OR PREPARED TO DO ANYTHING TO END YOUR LIFE?: NO
1. IN THE PAST MONTH, HAVE YOU WISHED YOU WERE DEAD OR WISHED YOU COULD GO TO SLEEP AND NOT WAKE UP?: NO
2. HAVE YOU ACTUALLY HAD ANY THOUGHTS OF KILLING YOURSELF?: NO

## 2024-11-27 ASSESSMENT — PAIN DESCRIPTION - LOCATION: LOCATION: HIP

## 2024-11-27 ASSESSMENT — LIFESTYLE VARIABLES
AUDIT-C TOTAL SCORE: 1
HOW OFTEN DO YOU HAVE 6 OR MORE DRINKS ON ONE OCCASION: NEVER
HOW MANY STANDARD DRINKS CONTAINING ALCOHOL DO YOU HAVE ON A TYPICAL DAY: 1 OR 2
AUDIT-C TOTAL SCORE: 1
SKIP TO QUESTIONS 9-10: 1
HOW OFTEN DO YOU HAVE A DRINK CONTAINING ALCOHOL: MONTHLY OR LESS

## 2024-11-27 ASSESSMENT — PAIN SCALES - WONG BAKER: WONGBAKER_NUMERICALRESPONSE: HURTS EVEN MORE

## 2024-11-27 NOTE — HH CARE COORDINATION
Home Care received a Referral for Physical Therapy and Occupational Therapy. We have processed the referral for a Start of Care on 24 HOURS .     If you have any questions or concerns, please feel free to contact us at [phone]. Follow the prompts, enter your five digit zip code, and you will be directed to your care team on [facility].

## 2024-11-27 NOTE — PROGRESS NOTES
Physical Therapy    Physical Therapy Evaluation & Treatment    Patient Name: Tom Trimble  MRN: [id number]  Department: [facility]  Room: [facility] PACU 22  Today's Date: 11/27/2024   Time Calculation  Start Time: 1437  Stop Time: 1513  Time Calculation (min): 36 min    Assessment/Plan   PT Assessment  PT Assessment Results: Decreased strength, Decreased endurance, Impaired balance, Decreased mobility, Decreased skin integrity, Orthopedic restrictions, Pain  Rehab Prognosis: Excellent  Barriers to Discharge: pending stair training with PT tomorrow and issue of wheeled walker/script for shower chair tomorrow  Evaluation/Treatment Tolerance: Patient tolerated treatment well  Medical Staff Made Aware: Yes  Strengths: Ability to acquire knowledge, Attitude of self, Coping skills, Support of Caregivers, Premorbid level of function  Barriers to Participation: Other (Comment) (none)  End of Session Communication: Bedside nurse, Physician  Assessment Comment: 43 yo female with Right hip OA s/p anterior Right THR presents with mild post-op Right hip pain, balance deficits and decreased activity tolerance. Recommend 1-2 more PT visits for training of pt/family on stairs and then DC Home with assist prn and low intensity home therapy.  End of Session Patient Position: Bed, 2 rail up, Alarm off, not on at start of session (stretcher in PACU)   IP OR SWING BED PT PLAN  Inpatient or Swing Bed: Inpatient  PT Plan  Treatment/Interventions: Bed mobility, Transfer training, Gait training, Stair training, Balance training, Neuromuscular re-education, Endurance training, Therapeutic exercise, Therapeutic activity, Home exercise program, Postural re-education, Positioning  PT Plan: Ongoing PT  PT Frequency: BID  PT Discharge Recommendations: Low intensity level of continued care, Other (comment) (home PT; after able to do stairs and safe to DC home)  Equipment Recommended upon Discharge: Other (comment), Wheeled  walker (and prescription for shower chair; team informed)  PT Recommended Transfer Status: Assist x1, Assistive device, Contact guard (CG/min A +1, WBAT Right LE)  PT - OK to Discharge:  (needs stair training with PT tomorrow and issue of WW prior to DC)      Subjective     General Visit Information:  General  Reason for Referral: Admitted 11/27  for elective surgery; dx: Right hip OA; 11/27 s/p Right anterior total hip arthroplasty by Dr. Allen  Past Medical History Relevant to Rehab: anemia, OA bilateral hips, HTN, obesity, GERD, tinea corporis, hidradenitis suppurativa  Family/Caregiver Present: No  Prior to Session Communication: Bedside nurse  Patient Position Received: Bed, 2 rail up (in PACU on stretcher)  Preferred Learning Style: verbal, kinesthetic  General Comment: Pt alert and engaged, excited and anxious about new hip, per her report. Pt able to stand and sidestep and take multiple steps forward/back with walker/assist as noted (in PACU). Pt reports mild Right posterior hip pain.  Home Living:  Home Living  Type of Home: House (2nd floor of a double home)  Lives With: Alone (dtr going to stay with her for a week and can help as needed)  Home Adaptive Equipment:  (shower equipment as noted; bedside commode)  Home Layout: Multi-level (4 JAMES with 1 rail, 7 + 8 steps with 1 rail to bed/tub shower)  Home Access: Stairs to enter with rails  Entrance Stairs-Rails:  (1)  Entrance Stairs-Number of Steps: 4  Bathroom Shower/Tub: Tub/shower unit  Bathroom Toilet: Standard  Bathroom Equipment: Grab bars in shower  Bathroom Accessibility: on 2nd floor (lives on 2nd floor of double); bed & bath on same floor  Prior Level of Function:  Prior Function Per Pt/Caregiver Report  Level of Lander:  (independent ambulation  indoors no device/outdoors no device short distances, independent stairclimbing, no falls; using bedside commode due to pain in hip and urgency)  Receives Help From:  (will receive help from dtr  "upon DC)  ADL Assistance: Independent  Homemaking Assistance: Independent  Ambulatory Assistance: Independent  Vocational:  (currently not working to take time off for surgery)  Hand Dominance: Right  Prior Function Comments: drives  Precautions:  Precautions  LE Weight Bearing Status:  (per NP Nancymojgan Belltaylor pt is WBAT Right LE)  Post-Surgical Precautions:  (no precautions (anterior THR))    Vital Signs (Past 2hrs)        Date/Time Vitals Session Patient Position Pulse Resp SpO2 BP MAP (mmHg)                          11/27/24 1437 During PT  Sitting  70  --  97 %  127/78  --                Vital Signs Comment: no dizziness, RN present/aware    Objective   Pain:  Pain Assessment  Pain Assessment: 0-10  0-10 (Numeric) Pain Score:  (2/10 pre, 4/10 post Right posterior hip)  Pain Interventions: Repositioned, Ambulation/increased activity, Emotional support, Rest, Therapeutic presence, Therapeutic touch (RN present and going to give her meds)  Response to Interventions:  (resting comfortably supine at end of session)  Cognition:  Cognition  Overall Cognitive Status: Within Functional Limits  Arousal/Alertness: Appropriate responses to stimuli  Orientation Level: Oriented X4  Following Commands: Follows all commands and directions without difficulty  Processing Speed: Within funtional limits    General Assessments:  General Observation  General Observation: alert, engaged         Activity Tolerance  Endurance: Endurance does not limit participation in activity    Sensation  Light Touch: No apparent deficits (but does report mild right lateral proximal hip numbness (\"maybe from the nerve block\"))       Postural Control  Postural Control: Impaired  Posture Comment: standing with WW: incomplete truncal/hip extension, wider BLANCA    Static Sitting Balance  Static Sitting-Balance Support: Bilateral upper extremity supported, Feet supported  Static Sitting-Level of Assistance: Close supervision  Dynamic Sitting Balance  Dynamic " Sitting-Balance Support: Feet supported, Bilateral upper extremity supported  Dynamic Sitting-Level of Assistance: Close supervision  Dynamic Sitting-Balance:  (scooting to edge of stretcher in PACU)    Static Standing Balance  Static Standing-Balance Support: Bilateral upper extremity supported (on WW)  Static Standing-Level of Assistance: Contact guard (and cues for posture)  Dynamic Standing Balance  Dynamic Standing-Balance Support: Bilateral upper extremity supported (on WW)  Dynamic Standing-Level of Assistance: Contact guard (and cues for sequencing (see 'Gait'))  Dynamic Standing-Balance:  (sidestepping, ant/posterior stepping with walker and assist, as noted)  Functional Assessments:  ADL  ADL's Addressed: No    Bed Mobility  Bed Mobility: Yes  Bed Mobility 1  Bed Mobility 1: Supine to sitting, Sitting to supine  Level of Assistance 1: Minimum assistance, Minimal verbal cues, Minimal tactile cues  Bed Mobility Comments 1: HOB elevated 30 degrees, in/out on Right side    Transfers  Transfer: Yes  Transfer 1  Transfer From 1: Sit to, Stand to  Transfer to 1: Sit, Stand  Technique 1: Sit to stand, Stand to sit  Transfer Device 1: Walker  Transfer Level of Assistance 1: Minimum assistance, Minimal verbal cues, Minimal tactile cues    Ambulation/Gait Training  Ambulation/Gait Training Performed: Yes  Ambulation/Gait Training 1  Surface 1: Level tile  Device 1: Rolling walker  Assistance 1: Contact guard, Minimal verbal cues, Minimal tactile cues (CG to min A)  Quality of Gait 1:  (antalgic Right LE, slow, small steps, cues and intermittent assist with walker and for sequencing, incomplete truncal/hip extension in stance)  Comments/Distance (ft) 1: 3 sidesteps to right, 2 steps ant/posterior x 4 reps in front of stretcher in PACU    Stairs  Stairs: No (pt in front part of PACU)  Extremity/Trunk Assessments:  RUE   RUE : Within Functional Limits  LUE   LUE: Within Functional Limits  RLE   RLE : Exceptions to WFL  (AROM grossly: ankle DF/PF WFL, knee flex >90, knee ext WFL, hip flexion >85; strength grossly: ankle DF >3, knee ext >3- (pain limited))  LLE   LLE : Within Functional Limits  Treatments:  Therapeutic Exercise  Therapeutic Exercise Performed: Yes  Therapeutic Exercise Activity 1: reviewed/instructed in/gave handout for bilateral LE anti-embolics: AP, knee push downs, glut sets  Outcome Measures:  [facility] Basic Mobility  Turning from your back to your side while in a flat bed without using bedrails: A little  Moving from lying on your back to sitting on the side of a flat bed without using bedrails: A little  Moving to and from bed to chair (including a wheelchair): A little  Standing up from a chair using your arms (e.g. wheelchair or bedside chair): A little  To walk in hospital room: A little  Climbing 3-5 steps with railing: Total  Basic Mobility - Total Score: 16    Encounter Problems       Encounter Problems (Active)       General Goals       Pt will be independent with THR HEP (anterior approach) (Progressing)       Start:  11/27/24    Expected End:  12/11/24            Pt will come supine to/from sit (HOB flat, no rail) modified independent (Progressing)       Start:  11/27/24    Expected End:  12/11/24            Pt will come sit to/from stand, bed to/from chair with WW WBAT Right LE modified independent (Progressing)       Start:  11/27/24    Expected End:  12/11/24               Mobility       Pt will ambulate 150' with WW WBAT Right LE modified independent (Progressing)       Start:  11/27/24    Expected End:  12/11/24            Pt will ascend/descend 7 + 8 steps with 1 rail and CGA/min HHA WBAT  (Not Progressing)       Start:  11/27/24    Expected End:  12/11/24                   Education Documentation  Handouts, taught by Marley Burt, PT at 11/27/2024  3:30 PM.  Learner: Patient  Readiness: Eager  Method: Explanation, Demonstration, Teach-back  Response: Needs Reinforcement, Demonstrated  Understanding, Verbalizes Understanding  Comment: PT purpose/POC, LE anti-embolics, WBAT, no precautions, safe bed mobility, transfers and short ambulation with WW, plan to do stairs with next PT/PTA tomorrow, vitals    Precautions, taught by Marley Burt PT at 11/27/2024  3:30 PM.  Learner: Patient  Readiness: Eager  Method: Explanation, Demonstration, Teach-back  Response: Needs Reinforcement, Demonstrated Understanding, Verbalizes Understanding  Comment: PT purpose/POC, LE anti-embolics, WBAT, no precautions, safe bed mobility, transfers and short ambulation with WW, plan to do stairs with next PT/PTA tomorrow, vitals    Body Mechanics, taught by Marley Burt PT at 11/27/2024  3:30 PM.  Learner: Patient  Readiness: Eager  Method: Explanation, Demonstration, Teach-back  Response: Needs Reinforcement, Demonstrated Understanding, Verbalizes Understanding  Comment: PT purpose/POC, LE anti-embolics, WBAT, no precautions, safe bed mobility, transfers and short ambulation with WW, plan to do stairs with next PT/PTA tomorrow, vitals    Home Exercise Program, taught by Marley Burt PT at 11/27/2024  3:30 PM.  Learner: Patient  Readiness: Eager  Method: Explanation, Demonstration, Teach-back  Response: Needs Reinforcement, Demonstrated Understanding, Verbalizes Understanding  Comment: PT purpose/POC, LE anti-embolics, WBAT, no precautions, safe bed mobility, transfers and short ambulation with WW, plan to do stairs with next PT/PTA tomorrow, vitals    Mobility Training, taught by Marley Burt PT at 11/27/2024  3:30 PM.  Learner: Patient  Readiness: Eager  Method: Explanation, Demonstration, Teach-back  Response: Needs Reinforcement, Demonstrated Understanding, Verbalizes Understanding  Comment: PT purpose/POC, LE anti-embolics, WBAT, no precautions, safe bed mobility, transfers and short ambulation with WW, plan to do stairs with next PT/PTA tomorrow, vitals    Education Comments  No comments  found.

## 2024-11-27 NOTE — H&P
[facility] Department of Orthopaedic Surgery   Surgical History & Physical >30 Days    Reason for Surgery: OA R Hip  Planned Procedure: R TERA    History & Physical Reviewed:  Shoneia M Collins is a 44 y.o. female presenting with the above symptoms. This patient was evaluated as an outpatient, and a plan was made for operative management. Risks and benefits were discussed, and the patient and/or caregivers elected to proceed. The patient presents for the above listed procedure today.      Past Medical History:   Diagnosis Date    Anemia     Arthritis     bilateral severe hip osteoarthritis    Bariatric surgery status     Benign hypertension 10/30/2018    Class 3 severe obesity due to excess calories without serious comorbidity with body mass index (BMI) of 50.0 to 59.9 in adult 2023    Encounter for pre-operative cardiovascular clearance 2023    Liam Doss MD For Bariatric Surgery    GERD (gastroesophageal reflux disease) 2023    Hidradenitis suppurativa     Hypertrophy of tonsils with hypertrophy of adenoids 2014    Tonsillar and adenoid hypertrophy    Personal history of gestational diabetes 2017    History of gestational diabetes mellitus (GDM)    Snoring 11/10/2014    Snoring    Thrombophlebitis 2023    Varicose veins with pain 2023    Vitamin B12 deficiency 2023    Vitamin D deficiency 2023     Past Surgical History:   Procedure Laterality Date    BARIATRIC SURGERY  2024    LAP RYGB WITH DR MYERS @ [facility]     SECTION, CLASSIC  11/10/2014     Section     SECTION, LOW TRANSVERSE      x2    CHOLECYSTECTOMY      GALLBLADDER SURGERY  11/10/2014    Gallbladder Surgery    HYSTERECTOMY      OTHER SURGICAL HISTORY  2021    Laparoscopic hysterectomy    OTHER SURGICAL HISTORY  2021    Bilateral salpingectomy    OTHER SURGICAL HISTORY  2021    Cystoscopy    TONSILLECTOMY      TUBAL LIGATION  2017    Tubal  Ligation     Social History     Tobacco Use    Smoking status: Former     Current packs/day: 0.00     Average packs/day: 1 pack/day for 2.0 years (2.0 ttl pk-yrs)     Types: Cigarettes     Start date:      Quit date:      Years since quittin.9     Passive exposure: Never    Smokeless tobacco: Never    Tobacco comments:     24:  [facility]   Substance Use Topics    Alcohol use: Not Currently     Comment: 24: Verbalizes none since surgery hx socially, rarely  [facility]     Prior to Admission medications    Medication Sig Start Date End Date Taking? Authorizing Provider   calcium citrate-vitamin D3 500 mg-12.5 mcg (500 unit) tablet,chewable Chew 1,500 mg once daily. 24   CA Grace   chlorhexidine (Hibiclens) 4 % external liquid Use as a wash to affected areas once daily 24   Ginger Burton PA-C   chlorhexidine (Peridex) 0.12 % solution Swish and spit. Do not swallow. Use the night before and morning of surgery as directed 24   Ginger Burton PA-C   clindamycin (Cleocin T) 1 % lotion Apply topically if needed (for HS). 24   CA Grace   diclofenac sodium (Voltaren) 1 % gel Apply 4.5 inches (4 g) topically 4 times a day. 24   CA Grace   dicyclomine (Bentyl) 10 mg capsule Take 1 capsule (10 mg) by mouth 4 times a day before meals. Take with meals and at bedtime 24  CA Perkins   doxycycline (Monodox) 100 mg capsule Take 1 capsule (100 mg) by mouth 2 times a day. Take with at least 8 ounces (large glass) of water, do not lie down for 30 minutes after 9/10/24 12/9/24  Basil Kendall MD   ferrous sulfate, 325 mg ferrous sulfate, tablet Take 1 tablet by mouth once daily. 24  CA Grace   Fiber Therapy, m-cellulose, 500 mg tablet  3/2/24   Historical Provider, MD   fluticasone (Flonase) 50 mcg/actuation nasal spray Administer into affected nostril(s) if needed. 22   Historical  Provider, MD   folic acid (Folvite) 1 mg tablet TAKE ONE (1) TABLET (1 MG) BY MOUTH ONCE DAILY. 11/13/24   CA Grace   ketoconazole (NIZOral) 2 % cream Apply topically if needed.    Historical Provider, MD   lidocaine (Lidoderm) 5 % patch Place 1 patch over 12 hours on the skin once daily. Apply to painful area 12 hours per day, remove for 12 hours. 9/4/24 9/4/25  CA Grace   loratadine (Claritin Reditabs) 10 mg disintegrating tablet Take 1 tablet (10 mg) by mouth once daily. 6/11/23   Historical Provider, MD   multivitamin tablet TAKE ONE (1) TABLET BY MOUTH ONCE DAILY. 11/13/24   CA Grace   omeprazole (PriLOSEC) 40 mg DR capsule Take 1 capsule (40 mg) by mouth once daily in the morning. Take before meals. Do not crush or chew. Open capsule, sprinkle beads on SF jello, pudding or applesauce. 7/16/24   CA Pryor   tiZANidine (Zanaflex) 4 mg capsule Take 1 capsule (4 mg) by mouth once daily at bedtime. 11/14/23 11/13/24  CA Grace   Vitamin B-12 500 mcg tablet TAKE ONE (1) TABLET (500 MCG) BY MOUTH ONCE DAILY. 11/13/24   CA Grace   chlorhexidine (Hibiclens) 4 % external liquid Use as a wash to affected areas once daily 9/10/24 11/21/24  Basil Kendall MD     Allergies   Allergen Reactions    Pollen Extracts Runny nose       Review of Systems:   Gen: Denies recent weight loss  Neuro: Denies recent confusion  Ophtho: Denies changes in vision  ENT: Denies changes in hearing  Endo: Denies weight loss/weight gain  CV: Denies chest pain  Resp: Denies shortness of breath  GI: Denies melena/hematochezia  : Denies painful urination  MSK: Per above HPI  Heme: No abnormal bleeding  Psych: Denies hallucinations    Physical Exam:  - Constitutional: No acute distress, cooperative  - Eyes: EOM grossly intact  - Head/Neck: Trachea midline  - Respiratory/Thorax: Normal work of breathing  - Cardiovascular: RRR on peripheral palpation  -  Gastrointestinal: Nondistended  - Psychological: Appropriate mood/behavior  - Skin: Warm and dry. Additional findings in musculoskeletal evaluation  - Musculoskeletal:   Moving extremities    ERAS patient?: No    COVID-19 Risk Consent:   Surgeon has reviewed the key risks related to jovanni COVID-19 and subsequent sequelae.       Desiree Schreiber MD 11/27/24

## 2024-11-27 NOTE — DISCHARGE INSTR - OTHER ORDERS
WOUND CARE  You may shower over dressing.  Do not let the water directly hit the dressing. Pat dry when finished.   Your surgical bandage will be removed by you or your home therapist 1 week after surgery.  A skin closure device has been placed under your surgical dressing.  You may shower with the device in place. . After showering, gently pat the area dry. The skin closure device will be removed at your postoperative visit. Do not pull or tug on the device. Keep incision site clean and dry. May cover with bandage as needed.  Do not apply creams or lotions.  No tub soaks.  If you experience continued drainage or bleeding, you may cover with abdominal pads (purchase at local drug store).  Knee replacements should wrap with an ace wrap.      DENTAL PROCEDURES & CLEANINGS  You must wait a minimum of 3 months for elective dental appointments, including routine cleanings or dental work including bridges, crowns, extractions, etc..  For any dental visit - cleaning or dental procedures - patients must take an antibiotic 1 hour before the appointment.  Antibiotics are a lifelong need before dental appointments.  The antibiotic prescribed will be based on each patient's allergies.    ICE & COLD THERAPY INSTRUCTIONS       To assist with pain control and post-op swelling, you should be using ice regularly throughout recovery, especially for the first 6 weeks, regardless of the cold therapy method you use.      Always make sure there is a layer of protection between the cold pad and your skin.    If you are using ICE PACKS or GEL PACKS, you will need to alternate 20 minutes on, 20 minutes off twice per hour.    If you are using an ICE MACHINE, please follow the provided ice machine instructions.  These devices differ from ice or ice packs whereas the mechanism circulates water through tubing and a pad to provide longer periods of cold therapy to the desired site.  You can use your cold devices around the clock for optimal  comfort.  We recommend using cold therapy after working with therapy or completing exercises on your own.  There is no set schedule in which you must follow while using cold therapy.  Below are a few points to remember when using a cold therapy device:    You do not need to need to use the 20 on, 20 off method.  Detach the pad from the cooler and ambulate at least once every hour.  You can check your skin under the pad at this time.  You may wear the cold therapy device during periods of sleep including overnight.  If you wake up during the night, you can check the skin at this time.  You do not need to wake up specifically to perform skin checks.  Empty the cooler and pad when device is not in use.  Follow 's instructions for cleaning your cold therapy device.

## 2024-11-27 NOTE — PROGRESS NOTES
"Orthopaedic Surgery Progress Note    S: Evaluated in immediate postoperative period. Pain well controlled considering recent surgery. Denies chest pain, shortness of breath, or fevers.    O:  /89   Pulse 64   Temp 36.6 °C (97.9 °F) (Temporal)   Resp 16   Ht 1.702 m (5' 7\")   Wt 110 kg (241 lb 6.5 oz)   LMP 10/07/2021   SpO2 98%   BMI 37.81 kg/m²     Gen: arousable, NAD, appropriately conversational  Cardiac: RRR to peripheral palpation  Resp: nonlabored on RA  GI: soft, nondistended    MSK:  Right Lower Extremity:   -Dressing intact without strikethrough  -Fires DF/PF/EHL  -SILT in saph/sural/SPN/DPN distributions  -Foot warm, well perfused  -Palpable DP pulse, brisk cap refill  -Compartments soft and compressible    A/P: 44 y.o. female s/p R TERA on 11/27 with Dr. Allen.      Plan:  - Weight bearing: WBAT  - DVT ppx: SCDs,  ASA 81 BID  - Diet: Regular  - Pain: Tylenol, oxycodone 5/10  - Antibiotics: perioperative ancef 2g q8hr x3 doses  - FEN: HLIV with good PO intake  - Bowel Regimen: Colace, senna, dulcolax  - PT/OT  - Pulm: Encourage IS  - Continue home medications  - No thornton    Dispo: anticipate [facility] tomorrow 11/28    While admitted, this patient will be followed by the Ortho Trauma Team. Please contact the residents listed below with any questions (available via Epic Chat weekdays). Please page [phone] (ortho on-call) after 6pm and on weekends.    Ortho Trauma  First Call: Desiree Schreiber, PGY-1  Second Call: Ted Monahan, PGY-2  Third Call: Clint Mcknight, PGY-3  "

## 2024-11-27 NOTE — ANESTHESIA PREPROCEDURE EVALUATION
"Patient: Shoneia M Collins \"Tom\"    Procedure Information       Date/Time: 24 0755    Procedure: Arthroplasty Total Hip Anterior Approach - Right (Right: Hip)    Location: [facility] OR  [facility] OR    Surgeons: Marvin Allen MD          45 y/o woman presenting today for right TERA  PMH: HTN, obesity s/p lap gastric bypass sugery 2024, anemia    Relevant Problems   Neuro   (+) Reaction to chronic stress      Endocrine   (+) Class 3 severe obesity due to excess calories without serious comorbidity with body mass index (BMI) of 50.0 to 59.9 in adult   (+) Morbid obesity (Multi)   (+) Morbid obesity due to excess calories (Multi)      Hematology   (+) Anemia      Musculoskeletal   (+) Primary osteoarthritis of both hips   (+) Primary osteoarthritis of right hip      ID   (+) Hidradenitis suppurativa   (+) Tinea corporis       Clinical information reviewed:   Tobacco  Allergies  Meds   Med Hx  Surg Hx  OB Status  Fam Hx  Soc   Hx        NPO Detail:  NPO/Void Status  Carbohydrate Drink Given Prior to Surgery? : N  Date of Last Liquid: 24  Time of Last Liquid: 0000  Date of Last Solid: 24  Time of Last Solid: 0000  Last Intake Type: Clear fluids  Time of Last Void: 0643         Vitals:    24 0639   BP: 161/85   Pulse: 92   Resp: 18   Temp: 36.2 °C (97.2 °F)   SpO2: 99%       Past Surgical History:   Procedure Laterality Date    BARIATRIC SURGERY  2024    [place]AP R[place] MYERS [place] [facility] [place]     SECTION, CLASSIC  11/10/2014     Section     SECTION, LOW TRANSVERSE      x2    CHOLECYSTECTOMY      GALLBLADDER SURGERY  11/10/2014    Gallbladder Surgery    HYSTERECTOMY      OTHER SURGICAL HISTORY  2021    Laparoscopic hysterectomy    OTHER SURGICAL HISTORY  2021    Bilateral salpingectomy    OTHER SURGICAL HISTORY  2021    Cystoscopy    TONSILLECTOMY      TUBAL LIGATION  2017    Tubal Ligation     Past Medical History: "   Diagnosis Date    Anemia     Arthritis     bilateral severe hip osteoarthritis    Bariatric surgery status     Benign hypertension 10/30/2018    Class 3 severe obesity due to excess calories without serious comorbidity with body mass index (BMI) of 50.0 to 59.9 in adult 2023    Encounter for pre-operative cardiovascular clearance 2023    Liam Doss MD For Bariatric Surgery    GERD (gastroesophageal reflux disease) 2023    Hidradenitis suppurativa     Hypertrophy of tonsils with hypertrophy of adenoids 2014    Tonsillar and adenoid hypertrophy    Personal history of gestational diabetes 2017    History of gestational diabetes mellitus (GDM)    Snoring 11/10/2014    Snoring    Thrombophlebitis 2023    Varicose veins with pain 2023    Vitamin B12 deficiency 2023    Vitamin D deficiency 2023       Current Facility-Administered Medications:     povidone-iodine 5 % kit kit, , Topical, Once, Marvin Allen MD  Allergies   Allergen Reactions    Pollen Extracts Runny nose     Social History     Tobacco Use    Smoking status: Former     Current packs/day: 0.00     Average packs/day: 1 pack/day for 2.0 years (2.0 ttl pk-yrs)     Types: Cigarettes     Start date:      Quit date:      Years since quittin.9     Passive exposure: Never    Smokeless tobacco: Never    Tobacco comments:     24:  Verified verbally [facility]   Substance Use Topics    Alcohol use: Not Currently     Comment: 24: Verbalizes none since surgery hx socially, rarely  [facility]         Chemistry    Lab Results   Component Value Date/Time     2024 0848    K 4.2 2024 0848     2024 0848    CO2 26 2024 0848    BUN 8 2024 0848    CREATININE 0.62 2024 0848    Lab Results   Component Value Date/Time    CALCIUM 9.4 2024 0848    ALKPHOS 43 2024 1537    AST 17 2024 1537    ALT 15 2024 1537    BILITOT 0.7 2024  1537          Lab Results   Component Value Date/Time    WBC 5.7 11/25/2024 0848    HGB 12.1 11/25/2024 0848    HCT 37.3 11/25/2024 0848     11/25/2024 0848     Lab Results   Component Value Date/Time    PROTIME 11.3 11/25/2024 0848    INR 1.0 11/25/2024 0848       TTE 2023     CONCLUSIONS:   1. Left ventricular systolic function is normal with a 55-60% estimated ejection fraction.   2. Trace mitral valve regurgitation.   3. Mild tricuspid regurgitation visualized.   4. Slightly elevated RVSP.   5. The estimated PASP is 36 mmHg.        NPO Detail:  NPO/Void Status  Carbohydrate Drink Given Prior to Surgery? : N  Date of Last Liquid: 11/26/24  Time of Last Liquid: 0000  Date of Last Solid: 11/24/24  Time of Last Solid: 0000  Last Intake Type: Clear fluids  Time of Last Void: 0643         Review of Systems    Physical Exam    Airway  Mallampati: II  TM distance: >3 FB     Cardiovascular   Rhythm: regular     Dental - normal exam     Pulmonary   Breath sounds clear to auscultation     Abdominal   (+) obese             Anesthesia Plan    History of general anesthesia?: yes  History of complications of general anesthesia?: no    ASA 3     general     The patient is not a current smoker.    intravenous induction   Anesthetic plan and risks discussed with patient.  Use of blood products discussed with patient who consented to blood products.    Plan discussed with CRNA, attending and CAA.

## 2024-11-27 NOTE — ANESTHESIA POSTPROCEDURE EVALUATION
"Patient: Shoneia M Collins \"Tom\"    Procedure Summary       Date: 11/27/24 Room / Location: [facility] OR 07 / [facility] OR    Anesthesia Start: 0830 Anesthesia Stop: 1055    Procedure: Arthroplasty Total Hip Anterior Approach - Right (Right: Hip) Diagnosis:       Primary osteoarthritis of right hip      (Primary osteoarthritis of right hip [M16.11])    Surgeons: Marvin Allen MD Responsible Provider: Harriet Arreola MD    Anesthesia Type: general ASA Status: 3            Anesthesia Type: general    Vitals Value Taken Time   /79 11/27/24 1530   Temp 36.1 °C (97 °F) 11/27/24 1230   Pulse 55 11/27/24 1544   Resp 18 11/27/24 1536   SpO2 98 % 11/27/24 1544   Vitals shown include unfiled device data.    Anesthesia Post Evaluation    Patient location during evaluation: PACU  Patient participation: complete - patient participated  Level of consciousness: awake and alert  Pain management: adequate  Airway patency: patent  Cardiovascular status: acceptable  Respiratory status: acceptable  Hydration status: acceptable  Postoperative Nausea and Vomiting: none        No notable events documented.    "

## 2024-11-27 NOTE — DISCHARGE INSTRUCTIONS
JOINT CARE TEAM  Please use the information below to contact your care team following surgery.  If you are leaving a message, please include your full name, date of birth and date of surgery so that we can correctly identify you.  Your call will be returned within 1-2 business days, please do not leave multiple messages regarding a single issue while you are awaiting a return call.     Who to call Contact Information Matters needing handled    Meera CRAIG, RN   Ortho/Trauma Nurse Navigator    [phone]   Prescription Refills   Orders for dental antibiotics lifelong  Nursing, medical question related questions or concerns within 6 weeks of surgery   Orders for Outpatient Physical Therapy          [place]            [phone] [address]     Scheduling office Visits  Leave of Absence or other paperwork  Any concerns more than 6 weeks from surgery - an appointment will need to be made     MEDICATION REFILLS - KIARA Stringer, RN (MyChart or Main Office)    You will not receive a call indicating that your prescription has been filled.  Please contact your pharmacy with any questions.    Medication refills will be filled Monday-Friday 7am to 1pm ONLY. Please call the office or send a [employer] message for a refill request.  Any requests received outside of this timeframe will be handled on the next business day.  Messages should be left directly through the office or via my chart.  Please do not call multiple times or call other members of the care team for medication needs, this will cause the refill to take longer.    Per State and Institutional policy, pain medications can only be refilled every 7 days for up to six weeks following surgery.    DISCHARGE MEDICATIONS - Please reference the sample schedule on the reverse side for instructions on how to best schedule medications.  PAIN MEDICATION    ___X___ Tramadol / Oxycodone  Tramadol and Oxycodone have been prescribed for post-operative pain  control.    These medications will only be refilled ONCE every 7 days for a period of up to 6 weeks following surgery.  After 6 weeks, you will transition to acetaminophen and over -the- counter anti-inflammatories such as Ibuprofen, Advil or Aleve in conjunction with ICE/COLD THERAPY.   Side effects may be constipation and nausea, vomiting, sleepiness, dizziness, lightheadedness, headache, blurred vision, dry mouth sweating, itching (if you have itching, over-the -counter Benadryl can be used as needed).  You may NOT operate a motor vehicle while taking these medications or have been cleared by your care team.     ___ X___Acetaminophen (Tylenol)  Acetaminophen has been prescribed as an adjunct for pain control. Take two 500 mg tablets every 6 hours for 4 weeks. You will not receive a refill on this medication.  Do not exceed 4000mg of acetaminophen within a 24 hour period.  Side effects may include nausea, heartburn, drowsiness, and headache.    _______ Meloxicam (Mobic)-Meloxicam has been prescribed as an adjunct anti-inflammatory to assist in pain control.    Take one 15mg tablet once daily for 4 weeks.  You will not receive refills on this medication.   Side effects may include nausea.  May not be prescribed if you are on a more potent blood thinner than aspirin or have chronic kidney disease.    BLOOD THINNER    ___X ___ Blood Thinner  or Resume prescribed medication  A blood thinner has been prescribed to prevent blood clots in your leg or lungs. Take as prescribed on the bottle for 4 weeks. You will not receive a refill on this medication.    ANTI NAUSEA    ______Pantoprazole (Protonix)  Pantoprazole has been prescribed to help with nausea and protect your stomach while taking pain medication. Take one 40 mg tablet once daily for 4 weeks. You will not receive a refill on this medication.    ______ Zofran   Zofran has been prescribed to help with nausea and protect your stomach while taking pain medication.  Take one 4 mg tablet every eight hours as needed for nausea. Refills will be provided as necessary.    STOOL SOFTENERS    ___ X___Colace (Docusate Sodium) & Miralax (polyethylene glycol)  Take both medications to help with constipation while using the Oxycodone and Tramadol for pain control.  You will not receive a refill on this medication.    ______ Senna  Senna has been prescribed to help with constipation while on Oxycodone and Tramadol. Take one tab, once daily. Senna is a laxative used to treat constipation.  Side effects may include nausea, vomiting, persistent diarrhea, abdominal cramps, and discolored urine.      You will not receive refills on the following medications:  Acetaminophen (Tylenol)  Meloxicam  Miralax  Colace  Pantoprazole  Blood Thinner    Pain Medication Refills [phone] or MyChart- Monday through Friday 7am-1pm    Medication refills will be sent upon receipt of your request during the times listed above. Due to the high call volume, you will not receive a call confirming prescription refills; please do not call multiple times.  Prescription refills may take a few hours to process, you may follow up with your pharmacy for pickup availability.    SAMPLE              The times below are an example of how to organize medications to optimize pain control      Time 3:00 am 6:00 am 9:00 am 12:00 pm 3:00 pm 6:00 pm 9:00 pm 12:00 am   Medications Tramadol Tylenol  Oxycodone  Miralax   Blood Thinner  Colace  Pantoprazole  Tramadol  Meloxicam Tylenol  Oxycodone Tramadol Tylenol  Oxycodone  Miralax Blood Thinner  Colace  Tramadol   Tylenol  Oxycodone            You may begin to wean off the pain medication as your pain remains controlled with increased activity.  The schedules provided are meant to serve as an example.  You may wean off based on your pain control.  Please note that pain medications are not filled beyond 6 weeks after surgery.              The times below are an example of how to  WEAN OFF medications WHILE CONTINUING TO OPTIMIZE PAIN CONTROL.  Your actual medication schedule may vary based on your last dose taken.    Time 12:00am 4:00am 8:00am 12:00pm 4:00pm 8:00pm   Med Tramadol Oxycodone   Tramadol Oxycodone Tramadol Oxycodone     Time 12:00am 6:00am 12:00pm 6:00pm   Med Tramadol Oxycodone   Tramadol Oxycodone     Time 12:00am 8:00am 4:00pm   Med Tramadol Oxycodone   Tramadol     Time 12:00am 12:00pm   Med Tramadol Tramadol

## 2024-11-27 NOTE — ANESTHESIA PROCEDURE NOTES
Peripheral Block    Patient location during procedure: OR  Start time: 11/27/2024 7:15 AM  End time: 11/27/2024 7:34 AM  Reason for block: at surgeon's request and post-op pain management  Staffing  Performed: attending   Authorized by: Grecia Silva MD    Performed by: Ilda Collazo MD  Preanesthetic Checklist  Completed: patient identified, IV checked, site marked, risks and benefits discussed, surgical consent, monitors and equipment checked, pre-op evaluation and timeout performed   Timeout performed at: 11/27/2024 7:16 AM  Peripheral Block  Patient position: laying flat  Prep: ChloraPrep  Patient monitoring: heart rate, continuous pulse ox and cardiac monitor  Block type: QL  Laterality: right  Injection technique: single-shot  Guidance: ultrasound guided  Local infiltration: bupivicaine  Infiltration strength: 0.5 %  Needle  Needle type: short-bevel   Needle gauge: 22 G  Needle length: 8 cm  Needle localization: ultrasound guidance     image stored in chart  Assessment  Injection assessment: negative aspiration for heme, no paresthesia on injection, incremental injection and local visualized surrounding nerve on ultrasound  Heart rate change: no  Slow fractionated injection: yes  Additional Notes  QL single shot. informed consent obtained. risks and benefits discussed. ASA monitors placed, general anesthesia induced and timeout performed. Pt positioned, prepped with chlorhexidine, draped with sterile towels.     Ultrasound guidance used with visualization of the needle throughout duration of the procedure. Aspiration was negative. A total of ropi  % 20 ml and precedex 8 mcg was divided and injected on the right  side

## 2024-11-27 NOTE — CONSULTS
Acute Pain Consult Note    Shoneia M Collins is a 44 y.o. year old female patient who presents for right total hip arthroplasty with Dr. Allen on 24. Acute Pain consulted for block for postoperative pain control.     Anticipated Postop Pain Issues -   Palliative: typically relieved with IV analgesics and regional local anesthetics  Provocative: typically with movement  Quality: typically burning and aching  Radiation: typically none  Severity: typically severe -10/10  Timing: typically constant    Past Medical History:   Diagnosis Date    Anemia     Arthritis     bilateral severe hip osteoarthritis    Bariatric surgery status     Benign hypertension 10/30/2018    Class 3 severe obesity due to excess calories without serious comorbidity with body mass index (BMI) of 50.0 to 59.9 in adult 2023    Encounter for pre-operative cardiovascular clearance 2023    Liam Doss MD For Bariatric Surgery    GERD (gastroesophageal reflux disease) 2023    Hidradenitis suppurativa     Hypertrophy of tonsils with hypertrophy of adenoids 2014    Tonsillar and adenoid hypertrophy    Personal history of gestational diabetes 2017    History of gestational diabetes mellitus (GDM)    Snoring 11/10/2014    Snoring    Thrombophlebitis 2023    Varicose veins with pain 2023    Vitamin B12 deficiency 2023    Vitamin D deficiency 2023        Past Surgical History:   Procedure Laterality Date    BARIATRIC SURGERY  2024    LAP RYGB WITH DR MYERS @ [facility] [place]     SECTION, CLASSIC  11/10/2014     Section     SECTION, LOW TRANSVERSE      x2    CHOLECYSTECTOMY      GALLBLADDER SURGERY  11/10/2014    Gallbladder Surgery    HYSTERECTOMY      OTHER SURGICAL HISTORY  2021    Laparoscopic hysterectomy    OTHER SURGICAL HISTORY  2021    Bilateral salpingectomy    OTHER SURGICAL HISTORY  2021    Cystoscopy    TONSILLECTOMY      TUBAL LIGATION   2017    Tubal Ligation        Family History   Problem Relation Name Age of Onset    Lung disease Mother      Diabetes Paternal Grandmother      Diabetes Other          Social History     Socioeconomic History    Marital status: Single     Spouse name: Not on file    Number of children: Not on file    Years of education: Not on file    Highest education level: Not on file   Occupational History    Not on file   Tobacco Use    Smoking status: Former     Current packs/day: 0.00     Average packs/day: 1 pack/day for 2.0 years (2.0 ttl pk-yrs)     Types: Cigarettes     Start date:      Quit date:      Years since quittin.9     Passive exposure: Never    Smokeless tobacco: Never    Tobacco comments:     24:  [facility]   Vaping Use    Vaping status: Never Used   Substance and Sexual Activity    Alcohol use: Not Currently     Comment: 24: Verbalizes none since surgery hx socially, rarely  [facility]    Drug use: Yes     Types: Marijuana     Comment: 24:  [facility]    Sexual activity: Yes     Partners: Male     Birth control/protection: Female Sterilization, Condom Male   Other Topics Concern    Not on file   Social History Narrative    Not on file     Social Drivers of Health     Financial Resource Strain: Low Risk  (2024)    Overall Financial Resource Strain (CARDIA)     Difficulty of Paying Living Expenses: Not hard at all   Food Insecurity: No Food Insecurity (2023)    Hunger Vital Sign     Worried About Running Out of Food in the Last Year: Never true     Ran Out of Food in the Last Year: Never true   Transportation Needs: No Transportation Needs (2024)    PRAPARE - Transportation     Lack of Transportation (Medical): No     Lack of Transportation (Non-Medical): No   Physical Activity: Not on file   Stress: Not on file   Social Connections: Not on file   Intimate Partner Violence: Not At Risk (10/6/2023)    Humiliation, Afraid, Rape, and Kick  questionnaire     Fear of Current or Ex-Partner: No     Emotionally Abused: No     Physically Abused: No     Sexually Abused: No   Housing Stability: Low Risk  (4/23/2024)    Housing Stability Vital Sign     Unable to Pay for Housing in the Last Year: No     Number of Places Lived in the Last Year: 1     Unstable Housing in the Last Year: No        Allergies   Allergen Reactions    Pollen Extracts Runny nose         Review of Systems  Gen: No fatigue, anorexia, insomnia, fever.   Eyes: No vision loss, double vision, drainage, eye pain.   ENT: No pharyngitis, dry mouth, no hearing changes or ear discharge  Cardiac: No chest pain, palpitations, syncope, near syncope.   Pulmonary: No shortness of breath, cough, hemoptysis.   Heme/lymph: No swollen glands, fever, bleeding.   GI: No abdominal pain, change in bowel habits, melena, hematemesis, hematochezia, nausea, vomiting, diarrhea.   : No discharge, dysuria, frequency, urgency, hematuria.  Endo: No polyuria or weight loss.   Musculoskeletal: +right hip pain  Skin: No rashes or lesions  Neuro: Normal speech, no numbness or weakness. No gait difficulties  Review of systems is otherwise negative unless stated above or in history of present illness.    Physical Exam:  Constitutional:  no distress, alert and cooperative  Eyes: clear sclera  Head/Neck: No apparent injury, trachea midline  Respiratory/Thorax: Patent airways, thorax symmetric, breathing comfortably  Cardiovascular: no pitting edema  Gastrointestinal: Nondistended  Musculoskeletal: ROM intact  Extremities: no clubbing  Neurological: alert, rangel x4  Psychological: Appropriate affect    No results found for this or any previous visit (from the past 24 hours).     Shoneia M Collins is a 44 y.o. year old female patient who presents for right total hip arthroplasty with Dr. Allen on 11/27/24. Acute Pain consulted for block for postoperative pain control.     Plan:    - Right QL SS block performed preoperatively  on 11/27  - Pain medications per primary team  - Will see on POD1 if inpatient    Acute Pain Team  pg [phone] ph [phone].

## 2024-11-27 NOTE — ANESTHESIA PROCEDURE NOTES
Spinal Block    Patient location during procedure: OR  Start time: 11/27/2024 8:36 AM  End time: 11/27/2024 8:44 AM  Reason for block: primary anesthetic  Staffing  Performed: MSA   Authorized by: Harriet Arreola MD    Performed by: TONI Reddy    Preanesthetic Checklist  Completed: patient identified, IV checked, risks and benefits discussed, surgical consent, monitors and equipment checked, pre-op evaluation, timeout performed and sterile techniques followed  Block Timeout  RN/Licensed healthcare professional reads aloud to the Anesthesia provider and entire team: Patient identity, procedure with side and site, patient position, and as applicable the availability of implants/special equipment/special requirements.  Patient on coagulant treatment: no  Timeout performed at: 11/27/2024 8:36 AM  Spinal Block  Patient position: sitting  Prep: ChloraPrep  Sterility prep: cap, drape, gloves, hand hygiene and mask  Sedation level: light sedation  Patient monitoring: blood pressure, continuous pulse oximetry and heart rate  Approach: midline  Vertebral space: L3-4  Injection technique: single-shot  Needle  Needle type: pencil-point   Needle gauge: 25 G  Needle length: 3.5 in  Free flowing CSF: yes    Assessment  Block outcome: block to be assessed in the OR  Procedure assessment: patient sedated but conversant throughout procedure  Additional Notes  Easy spinal per MSAS2 under direct supervision.

## 2024-11-27 NOTE — BRIEF OP NOTE
"Date: 2024  OR Location: [facility] OR    Name: Shoneia M Collins \"Tom\", : 1980, Age: 44 y.o., MRN: [id number], Sex: female    Diagnosis  Pre-op Diagnosis      * Primary osteoarthritis of right hip [M16.11] Post-op Diagnosis     * Primary osteoarthritis of right hip [M16.11]     Procedures  Arthroplasty Total Hip Anterior Approach - Right  [id number] - ID ARTHRP ACETBLR/PROX FEM PROSTC AGRFT/ALGRFT      Surgeons      * Marvin Allen - Primary    Resident/Fellow/Other Assistant:  Surgeons and Role:     * Lolly Byrnes DO - Resident - Assisting     * Clint Mcknight MD - Resident - Assisting     * LINDSEY Berman-C - PARKER First Assist    Staff:   Circulator: Goran Brennan Person: Solange Brennan Person: Talia  Circulator: Rosamaria    Anesthesia Staff: Anesthesiologist: Harriet Arreola MD  C-AA: TONI Reddy  EMERITA: Kaushki Oconnell    Procedure Summary  Anesthesia: General  ASA: III  Estimated Blood Loss: 150mL  Intra-op Medications:   Administrations occurring from 0755 to 1030 on 24:   Medication Name Total Dose   vancomycin (Vancocin) vial for injection 1 g   bupivacaine PF 0.25 % (Marcaine) 0.25 % (2.5 mg/mL) 30 mL, ketorolac (Toradol) 30 mg in sodium chloride 0.9% 30 mL syringe 61 mL   bupivacaine PF (Marcaine) 0.75 % 1.8 mL   ceFAZolin (Ancef) vial 1 g 2 g   ePHEDrine injection 10 mg   fentaNYL (Sublimaze) injection 50 mcg/mL 50 mcg   LR bolus Cannot be calculated   lidocaine (cardiac) injection 2% prefilled syringe 60 mg   midazolam PF (Versed) injection 1 mg/mL 2 mg   ondansetron (Zofran) 2 mg/mL injection 4 mg   phenylephrine (Jung-Synephrine) 10 mg/250 mL NS (40 mcg/mL) infusion 1.59 mg   phenylephrine 40 mcg/mL syringe 10 mL 560 mcg   propofol (Diprivan) injection 10 mg/mL 1,406.34 mg   tranexamic acid (Cyklokapron) injection 1,000 mg              Anesthesia Record               Intraprocedure I/O Totals          Intake    LR bolus 1600.00 mL    Tranexamic Acid 0.00 mL    The total " shown is the total volume documented since Anesthesia Start was filed.    Phenylephrine Drip 0.00 mL    The total shown is the total volume documented since Anesthesia Start was filed.    Total Intake 1600 mL       Output    Est. Blood Loss 150 mL    Total Output 150 mL       Net    Net Volume 1450 mL          Specimen: No specimens collected               Findings: End stage arthritic changes R hip    Complications:  None; patient tolerated the procedure well.     Disposition: PACU - hemodynamically stable.  Condition: stable  Specimens Collected: No specimens collected  Attending Attestation:     Clint Mcknight MD  Orthopaedic Surgery, PGY-3  Orth[facility] Trauma  [facility]

## 2024-11-28 ENCOUNTER — PHARMACY VISIT (OUTPATIENT)
Dept: PHARMACY | Facility: CLINIC | Age: 44
End: 2024-11-28
Payer: MEDICAID

## 2024-11-28 VITALS
HEIGHT: 67 IN | DIASTOLIC BLOOD PRESSURE: 64 MMHG | HEART RATE: 61 BPM | RESPIRATION RATE: 14 BRPM | BODY MASS INDEX: 37.89 KG/M2 | WEIGHT: 241.4 LBS | OXYGEN SATURATION: 95 % | TEMPERATURE: 97.3 F | SYSTOLIC BLOOD PRESSURE: 117 MMHG

## 2024-11-28 LAB
ANION GAP SERPL CALC-SCNC: 12 MMOL/L (ref 10–20)
BLOOD EXPIRATION DATE: NORMAL
BLOOD EXPIRATION DATE: NORMAL
BUN SERPL-MCNC: 6 MG/DL (ref 6–23)
CALCIUM SERPL-MCNC: 8.9 MG/DL (ref 8.6–10.6)
CHLORIDE SERPL-SCNC: 102 MMOL/L (ref 98–107)
CO2 SERPL-SCNC: 24 MMOL/L (ref 21–32)
CREAT SERPL-MCNC: 0.61 MG/DL (ref 0.5–1.05)
DISPENSE STATUS: NORMAL
DISPENSE STATUS: NORMAL
EGFRCR SERPLBLD CKD-EPI 2021: >90 ML/MIN/1.73M*2
ERYTHROCYTE [DISTWIDTH] IN BLOOD BY AUTOMATED COUNT: 16.1 % (ref 11.5–14.5)
GLUCOSE SERPL-MCNC: 91 MG/DL (ref 74–99)
HCT VFR BLD AUTO: 31.2 % (ref 36–46)
HGB BLD-MCNC: 10.2 G/DL (ref 12–16)
MCH RBC QN AUTO: 24.6 PG (ref 26–34)
MCHC RBC AUTO-ENTMCNC: 32.7 G/DL (ref 32–36)
MCV RBC AUTO: 75 FL (ref 80–100)
NRBC BLD-RTO: 0 /100 WBCS (ref 0–0)
PLATELET # BLD AUTO: 225 X10*3/UL (ref 150–450)
POTASSIUM SERPL-SCNC: 3.4 MMOL/L (ref 3.5–5.3)
PRODUCT BLOOD TYPE: 600
PRODUCT BLOOD TYPE: 600
PRODUCT CODE: NORMAL
PRODUCT CODE: NORMAL
RBC # BLD AUTO: 4.14 X10*6/UL (ref 4–5.2)
SODIUM SERPL-SCNC: 135 MMOL/L (ref 136–145)
UNIT ABO: NORMAL
UNIT ABO: NORMAL
UNIT NUMBER: NORMAL
UNIT NUMBER: NORMAL
UNIT RH: NORMAL
UNIT RH: NORMAL
UNIT VOLUME: 350
UNIT VOLUME: 350
WBC # BLD AUTO: 7.9 X10*3/UL (ref 4.4–11.3)
XM INTEP: NORMAL
XM INTEP: NORMAL

## 2024-11-28 PROCEDURE — 36415 COLL VENOUS BLD VENIPUNCTURE: CPT

## 2024-11-28 PROCEDURE — 85027 COMPLETE CBC AUTOMATED: CPT

## 2024-11-28 PROCEDURE — 7100000011 HC EXTENDED STAY RECOVERY HOURLY - NURSING UNIT

## 2024-11-28 PROCEDURE — 99231 SBSQ HOSP IP/OBS SF/LOW 25: CPT

## 2024-11-28 PROCEDURE — 80051 ELECTROLYTE PANEL: CPT

## 2024-11-28 PROCEDURE — 2500000001 HC RX 250 WO HCPCS SELF ADMINISTERED DRUGS (ALT 637 FOR MEDICARE OP): Mod: SE

## 2024-11-28 PROCEDURE — 2500000004 HC RX 250 GENERAL PHARMACY W/ HCPCS (ALT 636 FOR OP/ED): Mod: SE

## 2024-11-28 PROCEDURE — 97116 GAIT TRAINING THERAPY: CPT | Mod: GP,CQ

## 2024-11-28 ASSESSMENT — PAIN - FUNCTIONAL ASSESSMENT
PAIN_FUNCTIONAL_ASSESSMENT: 0-10

## 2024-11-28 ASSESSMENT — PAIN DESCRIPTION - ORIENTATION
ORIENTATION: RIGHT
ORIENTATION: RIGHT

## 2024-11-28 ASSESSMENT — COGNITIVE AND FUNCTIONAL STATUS - GENERAL
MOVING TO AND FROM BED TO CHAIR: A LITTLE
TURNING FROM BACK TO SIDE WHILE IN FLAT BAD: A LITTLE
MOBILITY SCORE: 18
CLIMB 3 TO 5 STEPS WITH RAILING: A LITTLE
MOVING FROM LYING ON BACK TO SITTING ON SIDE OF FLAT BED WITH BEDRAILS: A LITTLE
STANDING UP FROM CHAIR USING ARMS: A LITTLE
WALKING IN HOSPITAL ROOM: A LITTLE

## 2024-11-28 ASSESSMENT — PAIN DESCRIPTION - LOCATION
LOCATION: HIP
LOCATION: HIP

## 2024-11-28 ASSESSMENT — PAIN SCALES - GENERAL
PAINLEVEL_OUTOF10: 7
PAINLEVEL_OUTOF10: 5 - MODERATE PAIN
PAINLEVEL_OUTOF10: 5 - MODERATE PAIN
PAINLEVEL_OUTOF10: 7
PAINLEVEL_OUTOF10: 4
PAINLEVEL_OUTOF10: 5 - MODERATE PAIN

## 2024-11-28 NOTE — PROGRESS NOTES
"Orthopaedic Surgery Progress Note    S: Evaluated in immediate postoperative period. Pain well controlled considering recent surgery. Denies chest pain, shortness of breath, or fevers.    O:  /72 (BP Location: Left arm, Patient Position: Lying)   Pulse 76   Temp 36.6 °C (97.9 °F) (Temporal)   Resp 15   Ht 1.702 m (5' 7\")   Wt 110 kg (241 lb 6.5 oz)   LMP 10/07/2021   SpO2 95%   BMI 37.81 kg/m²     Gen: arousable, NAD, appropriately conversational  Cardiac: RRR to peripheral palpation  Resp: nonlabored on RA  GI: soft, nondistended    MSK:  Right Lower Extremity:   -Dressing intact without strikethrough  -Fires DF/PF/EHL  -SILT in saph/sural/SPN/DPN distributions  -Foot warm, well perfused  -Palpable DP pulse, brisk cap refill  -Compartments soft and compressible    A/P: 44 y.o. female s/p R TERA on 11/27 with Dr. Allen.      Plan:  - Weight bearing: WBAT  - DVT ppx: SCDs,  ASA 81 BID  - Diet: Regular  - Pain: Tylenol, oxycodone 5/10  - Antibiotics: perioperative ancef 2g q8hr x3 doses  - FEN: HLIV with good PO intake  - Bowel Regimen: Colace, senna, dulcolax  - PT/OT  - Pulm: Encourage IS  - Continue home medications  - No thornton    Dispo: anticipate [facility] tomorrow 11/28    While admitted, this patient will be followed by the Ortho Trauma Team. Please contact the residents listed below with any questions (available via Epic Chat weekdays). Please page [phone] (ortho on-call) after 6pm and on weekends.    Ortho Trauma  First Call: Desiree Schreiber, PGY-1  Second Call: Ted Monahan, PGY-2  Third Call: Clint Mcknight, PGY-3  "

## 2024-11-28 NOTE — NURSING NOTE
Patient instructed to not drive or operate machinery while taking tramadol or oxycodone. Patient verbalized understanding to not drive or operate machinery while taking tramadol or oxycodone.

## 2024-11-28 NOTE — PROGRESS NOTES
PT recommends low intensity, WW and shower chair for discharge. DME referral sent to [place]. Pending confirmation of acceptance. WW to be provided from [facility] [place] closet once approval is confirmed. TCC and bedside RN notified. Care Transitions will continue to follow.     1300-Patient provided with walker and verbalized understanding that bill may be received if insurance does not cover DME. Signed copy provided to patient and copy placed in [place] closet. TCC and floor notified.     YANET Lamar

## 2024-11-28 NOTE — HOSPITAL COURSE
44 y.o. year-old female who presented with R hip OA. Patient is now s/p R TERA on 11/28/2024 by Dr. Allen. On the day of surgery, patient was identified in the pre-operative holding area and agreeable to proceed with surgery. Written consent was obtained.  Please see operative note for further details of this procedure. Patient received 24 hours of gallo-operative antibiotics. Patient recovered in the PACU before transfer to a regular nursing floor. Patient was started on oxycodone, tylenol for pain control and ASA 81 mg bid for DVT prophylaxis. Physical therapy recommended continued recovery at home with continued physical therapy and wound care. On the day of discharge, patient was afebrile with stable vital signs. Patient was neurovascularly intact at time of discharge. Patient was discharged with prescription of ASA 81 mg bid for DVT prophylaxis for 6 weeks. Patient will follow-up with Dr. Allen in 2-3 weeks for post-operative visit.

## 2024-11-28 NOTE — CARE PLAN
Problem: Pain - Adult  Goal: Verbalizes/displays adequate comfort level or baseline comfort level  Outcome: Progressing     Problem: Fall/Injury  Goal: Not fall by end of shift  Outcome: Progressing  Goal: Be free from injury by end of the shift  Outcome: Progressing  Goal: Verbalize understanding of personal risk factors for fall in the hospital  Outcome: Progressing  Goal: Use assistive devices by end of the shift  Outcome: Progressing  Goal: Pace activities to prevent fatigue by end of the shift  Outcome: Progressing     The clinical goals for the shift include Patient's pain controlled to tolerable level. Patient compliant with DVT prophylaxis. Patient remains safe and free from falls.

## 2024-11-28 NOTE — PROGRESS NOTES
"Orthopaedic Surgery Progress Note    S: NAEO. Pain well controlled considering recent surgery. Denies chest pain, shortness of breath, or fevers.    O:  /72 (BP Location: Left arm, Patient Position: Lying)   Pulse 76   Temp 36.6 °C (97.9 °F) (Temporal)   Resp 15   Ht 1.702 m (5' 7\")   Wt 110 kg (241 lb 6.5 oz)   LMP 10/07/2021   SpO2 95%   BMI 37.81 kg/m²     Gen: arousable, NAD, appropriately conversational  Cardiac: RRR to peripheral palpation  Resp: nonlabored on RA  GI: soft, nondistended    MSK:  Right Lower Extremity:   -Dressing intact without strikethrough  -Fires DF/PF/EHL  -SILT in saph/sural/SPN/DPN distributions  -Foot warm, well perfused  -Palpable DP pulse, brisk cap refill  -Compartments soft and compressible    A/P: 44 y.o. female s/p R TERA on 11/27 with Dr. Allen.      Plan:  - Weight bearing: WBAT  - DVT ppx: SCDs,  ASA 81 BID  - Diet: Regular  - Pain: Tylenol, oxycodone 5/10  - Antibiotics: perioperative ancef 2g q8hr x3 doses  - FEN: HLIV with good PO intake  - Bowel Regimen: Colace, senna, dulcolax  - PT/OT consulted  - Pulm: Encourage IS  - Continue home medications  - No thornton    Dispo: pending PT/OT, likely today    While admitted, this patient will be followed by the Ortho Trauma Team. Please contact the residents listed below with any questions (available via Epic Chat weekdays). Please page [phone] (ortho on-call) after 6pm and on weekends.    Ortho Trauma  First Call: Desiree Schreiber, PGY-1  Second Call: Ted Monahan, PGY-2  Third Call: Clint Mcknight, PGY-3  "

## 2024-11-28 NOTE — PROGRESS NOTES
Damarisneleigh VO Atilio is a 44 y.o. year old female patient who presents for right total hip arthroplasty with Dr. Allen on 11/27/24. Acute Pain consulted for block for postoperative pain control     Postop Pain HPI -   Palliative: relieved with IV analgesics and regional local anesthetics  Provocative: movement  Quality:  burning and aching  Radiation:  none  Severity:  5-6/10  Timing: constant    24-HOUR OPIOID CONSUMPTION:  Diluadid 0.4mg q5 x3  Oxycodone 10mg q4 x4  Tylenol 650mg     Scheduled medications    Continuous medications    PRN medications       Physical Exam:  Constitutional:  no distress, alert and cooperative  Eyes: clear sclera  Head/Neck: No apparent injury, trachea midline  Respiratory/Thorax: Patent airways, thorax symmetric, breathing comfortably  Cardiovascular: no pitting edema  Gastrointestinal: Nondistended  Musculoskeletal: ROM intact  Extremities: no clubbing, RLE surgical dressing c/d/I, foot is warm and well perfused  Neurological: alert, rangel x4  Psychological: Appropriate affect    Results for orders placed or performed during the hospital encounter of 11/27/24 (from the past 24 hours)   CBC   Result Value Ref Range    WBC 7.9 4.4 - 11.3 x10*3/uL    nRBC 0.0 0.0 - 0.0 /100 WBCs    RBC 4.14 4.00 - 5.20 x10*6/uL    Hemoglobin 10.2 (L) 12.0 - 16.0 g/dL    Hematocrit 31.2 (L) 36.0 - 46.0 %    MCV 75 (L) 80 - 100 fL    MCH 24.6 (L) 26.0 - 34.0 pg    MCHC 32.7 32.0 - 36.0 g/dL    RDW 16.1 (H) 11.5 - 14.5 %    Platelets 225 150 - 450 x10*3/uL   Basic metabolic panel   Result Value Ref Range    Glucose 91 74 - 99 mg/dL    Sodium 135 (L) 136 - 145 mmol/L    Potassium 3.4 (L) 3.5 - 5.3 mmol/L    Chloride 102 98 - 107 mmol/L    Bicarbonate 24 21 - 32 mmol/L    Anion Gap 12 10 - 20 mmol/L    Urea Nitrogen 6 6 - 23 mg/dL    Creatinine 0.61 0.50 - 1.05 mg/dL    eGFR >90 >60 mL/min/1.73m*2    Calcium 8.9 8.6 - 10.6 mg/dL        Shoneleigh Trimble is a 44 y.o. year old female patient who presents for  right total hip arthroplasty with Dr. Allen on 11/27/24. Acute Pain consulted for block for postoperative pain control.      Plan:     - Right QL SS block performed preoperatively on 11/27  - Recommend adding gabapentin 300mg TID to pain regimen  - Pain medications per primary team    Acute pain will sign off. Please contact for questions or concerns.      Acute Pain Team  pg [phone] ph [phone].

## 2024-11-28 NOTE — PROGRESS NOTES
"Shoneia M Collins \"Tom\" is a 44 y.o. female on day 0 of admission presenting with Primary osteoarthritis of right hip.    Transitional Care Coordinator Note: Met with patient to discuss discharge planning s/p admission.  Patient informed TCC of dissatisfaction with bedside nursing care and need for patient to contact patient advocate. Patient also informed TCC she was originally supposed to be an outpatient surgery and was admitted secondary to need for DME equipment of which patient states was discussed on 11/27 but patient still had not received and was frustrated about. TCC/SW provided DME equipment, shower chair script, confirmed HC SOC with patient as well as updated provider team. No further needs at this time. Cesilia Fountain RN TCC via Epic.      "

## 2024-11-28 NOTE — DISCHARGE SUMMARY
Discharge Diagnosis  Primary osteoarthritis of right hip    Issues Requiring Follow-Up  As above    Test Results Pending At Discharge  Pending Labs       No current pending labs.            Hospital Course  44 y.o. year-old female who presented with R hip OA. Patient is now s/p R TERA on 11/28/2024 by Dr. Allen. On the day of surgery, patient was identified in the pre-operative holding area and agreeable to proceed with surgery. Written consent was obtained.  Please see operative note for further details of this procedure. Patient received 24 hours of gallo-operative antibiotics. Patient recovered in the PACU before transfer to a regular nursing floor. Patient was started on oxycodone, tylenol for pain control and ASA 81 mg bid for DVT prophylaxis. Physical therapy recommended continued recovery at home with continued physical therapy and wound care. On the day of discharge, patient was afebrile with stable vital signs. Patient was neurovascularly intact at time of discharge. Patient was discharged with prescription of ASA 81 mg bid for DVT prophylaxis for 6 weeks. Patient will follow-up with Dr. Allen in 2-3 weeks for post-operative visit.          Home Medications     Medication List      START taking these medications     acetaminophen 500 mg tablet; Commonly known as: Tylenol Extra Strength;   Take 2 tablets (1,000 mg) by mouth every 6 hours if needed for mild pain   (1 - 3).   aspirin 81 mg EC tablet; Take 1 tablet (81 mg) by mouth 2 times a day.   docusate sodium 100 mg capsule; Commonly known as: Colace; Take 1   capsule (100 mg) by mouth 2 times a day.   ketorolac 10 mg tablet; Commonly known as: Toradol; Take 1 tablet (10   mg) by mouth every 6 hours if needed for mild pain (1 - 3) for up to 3   days.   oxyCODONE 5 mg immediate release tablet; Commonly known as: Roxicodone;   Take 1 tablet (5 mg) by mouth every 6 hours if needed for severe pain (7 -   10) for up to 7 days.   pantoprazole 40 mg EC  tablet; Commonly known as: ProtoNix; Take 1 tablet   (40 mg) by mouth once daily. Do not crush, chew, or split.   polyethylene glycol 17 gram packet; Commonly known as: Glycolax,   Miralax; Take 17 g by mouth once daily. Mix 1 packet (17g) into 8 ounces   of fluid.   traMADol 50 mg tablet; Commonly known as: Ultram; Take 1 tablet (50 mg)   by mouth every 6 hours if needed for severe pain (7 - 10) for up to 7   days.     CONTINUE taking these medications     calcium citrate-vitamin D3 500 mg-12.5 mcg (500 unit) tablet,chewable;   Chew 1,500 mg once daily.   * chlorhexidine 4 % external liquid; Commonly known as: Hibiclens; Use   as a wash to affected areas once daily   * chlorhexidine 0.12 % solution; Commonly known as: Peridex; Swish and   spit. Do not swallow. Use the night before and morning of surgery as   directed   diclofenac sodium 1 % gel; Commonly known as: Voltaren; Apply 4.5 inches   (4 g) topically 4 times a day.   dicyclomine 10 mg capsule; Commonly known as: Bentyl; Take 1 capsule (10   mg) by mouth 4 times a day before meals. Take with meals and at bedtime   ferrous sulfate (325 mg ferrous sulfate) tablet; Take 1 tablet by mouth   once daily.   folic acid 1 mg tablet; Commonly known as: Folvite; TAKE ONE (1) TABLET   (1 MG) BY MOUTH ONCE DAILY.   ketoconazole 2 % cream; Commonly known as: NIZOral   loratadine 10 mg disintegrating tablet; Commonly known as: Claritin   Reditabs   multivitamin tablet; TAKE ONE (1) TABLET BY MOUTH ONCE DAILY.   omeprazole 40 mg DR capsule; Commonly known as: PriLOSEC; Take 1 capsule   (40 mg) by mouth once daily in the morning. Take before meals. Do not   crush or chew. Open capsule, sprinkle beads on SF jello, pudding or   applesauce.   tiZANidine 4 mg capsule; Commonly known as: Zanaflex; Take 1 capsule (4   mg) by mouth once daily at bedtime.   Vitamin B-12 500 mcg tablet; Generic drug: cyanocobalamin; TAKE ONE (1)   TABLET (500 MCG) BY MOUTH ONCE DAILY.  * This list  has 2 medication(s) that are the same as other medications   prescribed for you. Read the directions carefully, and ask your doctor or   other care provider to review them with you.     ASK your doctor about these medications     clindamycin 1 % lotion; Commonly known as: Cleocin T; Apply topically if   needed (for HS).   doxycycline 100 mg capsule; Commonly known as: Monodox; Take 1 capsule   (100 mg) by mouth 2 times a day. Take with at least 8 ounces (large glass)   of water, do not lie down for 30 minutes after   Fiber Therapy (m-cellulose) 500 mg tablet; Generic drug: methylcellulose   (laxative)   fluticasone 50 mcg/actuation nasal spray; Commonly known as: Flonase   lidocaine 5 % patch; Commonly known as: Lidoderm; Place 1 patch over 12   hours on the skin once daily. Apply to painful area 12 hours per day,   remove for 12 hours.       Outpatient Follow-Up  Future Appointments   Date Time Provider Department Center   12/2/2024 To Be Determined Samia Benson PT [facility]   12/17/2024  9:20 AM Nancy Louis PA-C [id number] [facility]   1/6/2025  3:45 PM Karan Wilson MD [id number] [facility]   2/6/2025 10:00 AM TORRES Grace-CNP [id number] [facility]   3/13/2025 10:30 AM Horetnsia Rutledge PA-C [id number] [facility]   4/15/2025  9:30 AM CA Pryor [id number] [place]   4/16/2025  1:30 PM Leela Chatterjee RDN, LD [id number] [facility]       Desiree Schreiber MD

## 2024-11-28 NOTE — PROGRESS NOTES
"  Physical Therapy Treatment    Patient Name: Shoneia M Collins \"Tom\"  MRN: [id number]  Today's Date: 11/28/2024  Room: [address] [place], [place] [address]  Time Calculation  Start Time: 0832  Stop Time: 0920  Time Calculation (min): 48 min       Assessment/Plan   PT Assessment  PT Assessment Results: Decreased strength, Decreased endurance, Impaired balance, Decreased mobility, Pain, Decreased range of motion  Rehab Prognosis: Good  Barriers to Discharge: none  Evaluation/Treatment Tolerance: Patient tolerated treatment well  Medical Staff Made Aware: Yes  Strengths: Attitude of self, Ability to acquire knowledge  Barriers to Participation: Comorbidities  End of Session Communication: Bedside nurse  End of Session Patient Position: Bed, 3 rail up     PT Plan  Treatment/Interventions: Bed mobility, Transfer training, Gait training, Stair training, Balance training, Neuromuscular re-education, Endurance training, Therapeutic exercise, Therapeutic activity, Home exercise program, Postural re-education, Positioning  PT Plan: Ongoing PT  PT Frequency: BID  PT Discharge Recommendations: Low intensity level of continued care, Equipment Recommended upon Discharge: Other (comment), Wheeled walker (and prescription for shower chair; team informed)  PT Recommended Transfer Status: Assist x1, Assistive device, SUP  PT - OK to Discharge:   Assessment: Patient is progressing Well with therapy this date. Able to tolerate stairs well, safe to DC when given walker. Would continue to benefit from continued skilled PT to address all mobility deficits; Patient remains appropriate for LOW intensity therapy when medically appropriate for discharge from acute stay.  Will continue to follow.     General Visit Information:   PT  Visit  PT Received On: 11/28/24  Prior to Session Communication: Bedside nurse  Patient Position Received: Bed, 3 rail up     Subjective   Subjective: Pt frustrated with pain med coordination, agreeable to therapy " "    Precautions:  Precautions  LE Weight Bearing Status: Weight Bearing as Tolerated  Precautions Comment: no precautions (anterior THR)  Vital Signs:  Vital Signs (Past 2hrs)        Date/Time Vitals Session Patient Position Pulse Resp SpO2 BP MAP (mmHg)    11/28/24 0749 --  --  61  14  95 %  117/64  82                     Objective   Pain:  Pain Assessment  Pain Assessment: 0-10  0-10 (Numeric) Pain Score: 7  Pain Type: Acute pain  Pain Location: Hip  Pain Orientation: Right  Pain Interventions: Cold applied, Repositioned, Medication (See MAR)  Cognition:  Cognition  Overall Cognitive Status: Within Functional Limits  Arousal/Alertness: Appropriate responses to stimuli  Orientation Level: Oriented X4  Following Commands: Follows all commands and directions without difficulty    PT Treatments:     Therapeutic Activity  Therapeutic Activity Performed: Yes  Therapeutic Activity 1: pt educated on \"no hip precautions\". Use of bed sheet to help RLE onto bed     Bed Mobility 1  Bed Mobility 1: Supine to sitting  Level of Assistance 1: Close supervision  Bed Mobility Comments 1: increased time and effort to complete  Bed Mobility 2  Bed Mobility  2: Sitting to supine  Level of Assistance 2: Minimum assistance  Bed Mobility Comments 2: assist for RLE  Ambulation/Gait Training 1  Surface 1: Level tile  Device 1: Rolling walker  Assistance 1: Distant supervision  Quality of Gait 1: Decreased step length  Comments/Distance (ft) 1: 65'x2 step to pattern slow nahomi  Transfer 1  Transfer From 1: Sit to  Transfer to 1: Stand  Technique 1: Sit to stand, Stand to sit  Transfer Device 1: Walker  Transfer Level of Assistance 1: Distant supervision  Trials/Comments 1: x4  Stairs  Stairs: Yes  Stairs  Rails 1: Bilateral  Device 1: Railing  Assistance 1: Close supervision, Minimal verbal cues  Comment/Number of Steps 1: 8 steps total, no LOB    Outcome Measures:  [facility] Basic Mobility  Turning from your back to your side while in a flat " bed without using bedrails: A little  Moving from lying on your back to sitting on the side of a flat bed without using bedrails: A little  Moving to and from bed to chair (including a wheelchair): A little  Standing up from a chair using your arms (e.g. wheelchair or bedside chair): A little  To walk in hospital room: A little  Climbing 3-5 steps with railing: A little  Basic Mobility - Total Score: 18       Education Documentation  Handouts, taught by Lisbet Martinez PTA at 11/28/2024  9:39 AM.  Learner: Patient  Readiness: Acceptance  Method: Explanation  Response: Verbalizes Understanding  Comment: POC    Precautions, taught by Lisbet Martinez PTA at 11/28/2024  9:39 AM.  Learner: Patient  Readiness: Acceptance  Method: Explanation  Response: Verbalizes Understanding  Comment: POC    Body Mechanics, taught by Lisbet Martinez PTA at 11/28/2024  9:39 AM.  Learner: Patient  Readiness: Acceptance  Method: Explanation  Response: Verbalizes Understanding  Comment: POC    Home Exercise Program, taught by Lisbet Martinez PTA at 11/28/2024  9:39 AM.  Learner: Patient  Readiness: Acceptance  Method: Explanation  Response: Verbalizes Understanding  Comment: POC    Mobility Training, taught by Lisbet Martinez PTA at 11/28/2024  9:39 AM.  Learner: Patient  Readiness: Acceptance  Method: Explanation  Response: Verbalizes Understanding  Comment: POC    Education Comments  No comments found.          OP EDUCATION:       Encounter Problems       Encounter Problems (Active)       General Goals       Pt will be independent with THR HEP (anterior approach) (Progressing)       Start:  11/27/24    Expected End:  12/11/24            Pt will come supine to/from sit (HOB flat, no rail) modified independent (Progressing)       Start:  11/27/24    Expected End:  12/11/24            Pt will come sit to/from stand, bed to/from chair with WW FWB Right LE modified independent (Not met)       Start:  11/27/24    Expected End:  12/11/24    Resolved:  11/27/24     Updated to: Pt will come sit to/from stand, bed to/from chair with WW WBAT Right LE modified independent    Update reason: update         Pt will come sit to/from stand, bed to/from chair with WW WBAT Right LE modified independent (Progressing)       Start:  11/27/24    Expected End:  12/11/24                   Mobility       Pt will ambulate 150' with WW FWB Right LE modified independent (Not met)       Start:  11/27/24    Expected End:  12/11/24    Resolved:  11/27/24    Updated to: Pt will ambulate 150' with WW WBAT Right LE modified independent    Update reason: update         Pt will ascend/descend 7 + 8 steps with 1 rail and CGA/min HHA  (Not met)       Start:  11/27/24    Expected End:  12/11/24    Resolved:  11/27/24    Updated to: Pt will ascend/descend 7 + 8 steps with 1 rail and CGA/min HHA WBAT Right LE    Update reason: update         Pt will ambulate 150' with WW WBAT Right LE modified independent (Progressing)       Start:  11/27/24    Expected End:  12/11/24                Pt will ascend/descend 7 + 8 steps with 1 rail and CGA/min HHA WBAT Right LE (Progressing)       Start:  11/27/24    Expected End:  12/11/24                   Pain - Adult

## 2024-11-29 NOTE — OP NOTE
"Arthroplasty Total Hip Anterior Approach - Right (R) Operative Note     Date: 2024  OR Location: [facility] OR    Name: Shoneia M Collins \"Tom\", : 1980, Age: 44 y.o., MRN: [id number], Sex: female    Diagnosis  Pre-op Diagnosis      * Primary osteoarthritis of right hip [M16.11] Post-op Diagnosis     * Primary osteoarthritis of right hip [M16.11]     Procedures  Arthroplasty Total Hip Anterior Approach - Right  [address]641 - [place] ARTHRP ACETBLR/PROX FEM PROSTC AGRFT/ALGRFT      Surgeons      * Marvin Allen - Primary    Resident/Fellow/Other Assistant:  Surgeons and Role:     * Lolly Byrnes DO - Resident - Assisting     * Clint Mcknight MD - Resident - Assisting     * LINDSEY Berman-C - PARKER First Assist: She was my primary assistant for this procedure.  There no available residents to assist with for this procedure.  Her assistance was needed and critical to the success of this procedure.  She assisted with surgical exposure, placement of retractors and implantation of implant/prosthesis and wound closure      Staff:   Circulator: Goran Bonnerub Person: Solange  Scrub Person: Talia  Circulator: Rosamaria    Anesthesia Staff: Anesthesiologist: Harriet Arreola MD  C-AA: TONI Reddy  EMERITA: Kaushik Oconnell    Procedure Summary  Anesthesia: General  ASA: III  Estimated Blood Loss: 75mL  Intra-op Medications:   Administrations occurring from 0755 to 1030 on 24:   Medication Name Total Dose   vancomycin (Vancocin) vial for injection 1 g   bupivacaine PF 0.25 % (Marcaine) 0.25 % (2.5 mg/mL) 30 mL, ketorolac (Toradol) 30 mg in sodium chloride 0.9% 30 mL syringe 61 mL   bupivacaine PF (Marcaine) 0.75 % 1.8 mL   ceFAZolin (Ancef) vial 1 g 2 g   ePHEDrine injection 10 mg   fentaNYL (Sublimaze) injection 50 mcg/mL 50 mcg   LR bolus Cannot be calculated   lidocaine (cardiac) injection 2% prefilled syringe 60 mg   midazolam PF (Versed) injection 1 mg/mL 2 mg   ondansetron (Zofran) 2 mg/mL injection 4 mg " "  phenylephrine (Jung-Synephrine) 10 mg/250 mL NS (40 mcg/mL) infusion 1.59 mg   phenylephrine 40 mcg/mL syringe 10 mL 560 mcg   propofol (Diprivan) injection 10 mg/mL 1,406.34 mg   tranexamic acid (Cyklokapron) injection 1,000 mg              Anesthesia Record               Intraprocedure I/O Totals          Intake    LR bolus 1700.00 mL    Tranexamic Acid 0.00 mL    The total shown is the total volume documented since Anesthesia Start was filed.    Phenylephrine Drip 0.00 mL    The total shown is the total volume documented since Anesthesia Start was filed.    Total Intake 1700 mL       Output    Est. Blood Loss 150 mL    Total Output 150 mL       Net    Net Volume 1550 mL          Specimen: No specimens collected              Drains and/or Catheters: * None in log *    Tourniquet Times:         Implants:  Implants       Type Name Action Serial No.      Joint Hip LINER, ALTRX, NEURTAL, 36 X 52MM - [id number] Implanted      Joint Hip FEMORAL HEAD, CERAMIC 36 +1.5 - [id number] Implanted      Joint Hip STEM, ACTIS COLLAR, HIGH, SIZE 3 - [id number] Implanted      Screw SCREW CANCELLOUS 6.5 X 20 - [id number] Implanted      Joint Hip ACETABULAR CUP, SECTOR, GRIPTON, SIZE 52MM - [id number] Implanted               Findings: see below    Indications: Shoneia M Collins \"Tom\" is an 44 y.o. female who is having surgery for Primary osteoarthritis of right hip [M16.11]. The patient with longstanding right hip  osteoarthritis and pain.  The patient has tried conservative  treatment without alleviation of their symptoms.  We discussed  arthroplasty as an option.  The patient was interested and wished to  proceed.  Patient have debilitating pain and wanted to proceed as soon as possible.  We discussed  the risks, benefits and alternatives.  Patient understood and wished to proceed.      The patient was seen in the preoperative area. The risks, benefits, complications, treatment options, non-operative alternatives, expected " recovery and outcomes were discussed with the patient. The possibilities of reaction to medication, pulmonary aspiration, injury to surrounding structures, bleeding, recurrent infection, the need for additional procedures, failure to diagnose a condition, and creating a complication requiring transfusion or operation were discussed with the patient. The patient concurred with the proposed plan, giving informed consent.  The site of surgery was properly noted/marked if necessary per policy. The patient has been actively warmed in preoperative area. Preoperative antibiotics have been ordered and given within 1 hours of incision. Venous thrombosis prophylaxis have been ordered including bilateral sequential compression devices and chemical prophylaxis    Procedure Details:     We proceeded to the operating room.  Appropriate time-out was  performed.  Anesthesia was initiated by the Anesthesia team.   After appropriate time-out was performed, the [place] table boot was  placed on bilateral feet.  We ensured that the boot was not too  tight.  Patient was transferred to the [place] table, placed in a supine  position where all bony prominences were adequately padded. Patient  received preoperative antibiotics and tranexamic acid to minimize  risk of bleeding.  Patient was prepped and draped in the usual sterile  fashion.  We made a direct anterior approach to the hip, centered over the  tensor fascia abimbola muscle belly.  Incision was made using the knife.   The fascia of the tensor muscle was incised.  We stayed within the  tensor compartment.  The lateral circumflex vessel was identified and  ligated.  We then elevated the rectus off the anterior hip capsule.   The anterior hip capsule was identified.  Inverted T-capsulotomy was  performed.  We placed a retractor around the neck.  Using the  anterior tubercle of the intertrochanteric line as the landmark, we  made a femoral neck cut.  The head was removed.  The head  was  severely deformed and had osteophytes around it.  The patient did  have degenerative labral tear.  The labrum was hypertrophic and some portion was calcified, it was  excised.  We then carefully placed retractors around the ring of the  acetabulum.  We began reaming.  We reamed starting with a 47 mm  reamer.  We then reamed up to 52 mm reamer.  We had good exposed  bleeding bone.  We used fluoroscopy to confirm appropriate reaming  and medialization.  We copiously irrigated and the 52 mm cup was then  placed.  I used the assistance of fluoroscopy and also the plane of the body to  appropriate cup positioning.  I was very satisfied  with the position of the cup.  The cup was impacted into place.   There was excellent stability of the cup.  In order to further  augment the stability, I chose to use one cancellous bone screw  through the hole in the cup.  We had excellent purchase with this  screw.  We then copiously irrigated.  The neutral liner was then  inserted and impacted into place with good fit.  Next, we turned our  attention to preparation of the femur for the stem.  We externally  rotated the femur.  We made a capsular release both medial and  laterally.  We extended the leg.  The femur ronaldo within the wound.   We then used calcar orientation to  the version of the stem.  A  box osteotome was used to lateralize the entry point.  The canal  finder was inserted.  We began broaching.  We started with a starter  broach and broached up to size 3.  There was excellent fit with the  broach.  There was no further subsiding of the broach.  The broach  was rotational and was very stable.  A high offset neck was placed on  the trunnion after it was cleaned based on preoperative templating.  A trial of +1.5 head  was then placed.  The hip was reduced.  We took the hip through range  of motion.  The hip was very stable under fluoroscopy.  I was very  satisfied with the fit of the stem.  The leg length was  equal. The hip was then dislocated.  The  broach was removed.  We then copiously irrigated and the actual stem  was impacted into place.  The collar sat about 1 mm shy of  resting on the calcar.  There was no further subsidence, and the  stent was rotational and stable.  At this point, we then cleaned the  trunnion and a +1.5, 36 mm head was then placed on the trunnion.  We  proceeded to reduce the hip, took it through extreme range of motion.  Again, the hip was very stable.  Radiographically, leg length was  equal.  We then copiously irrigated the wound bed.  We injected the  joint local solution to surrounding soft tissue.  The wound bed was dry with excellent hematosis.  Vancomycin powder was added to the wound.  We then repaired  the capsule and subsequently repaired the fascia of the tensor fascia  abimbola.  We then closed the wound in a layered fashion using 2-0 Biosyn  subcuticular sutures and running 4-0 subcuticular sutures were used.   Dermabond was then applied.  A  sterile dressing was applied.  The  patient was then awakened from sedation.  Prior to leaving the  operating room, I checked his clinical leg length and it was equal.   He was then transferred to PACU in stable condition without any  complication.  PLAN:    The patient will be evaluated by physical therapy and disposition planning.   Patient will be weightbearing as tolerated.  No hip precaution  required.  When patient is discharged, I will see patient in clinic in 2  weeks.  We will obtain x-ray of the AP pelvis, right  AP radiograph,  and cross-table lateral.  I was present for this entire surgical  procedure.     Marvin Allen MD.        Complications:  None; patient tolerated the procedure well.    Disposition: PACU - hemodynamically stable.  Condition: stable       Attending Attestation: I was present and scrubbed for the entire procedure.    Marvin Allen  Phone Number: [phone]

## 2024-11-30 ENCOUNTER — HOME CARE VISIT (OUTPATIENT)
Dept: HOME HEALTH SERVICES | Facility: HOME HEALTH | Age: 44
End: 2024-11-30
Payer: COMMERCIAL

## 2024-11-30 VITALS
SYSTOLIC BLOOD PRESSURE: 110 MMHG | TEMPERATURE: 98 F | DIASTOLIC BLOOD PRESSURE: 74 MMHG | RESPIRATION RATE: 18 BRPM | HEART RATE: 80 BPM

## 2024-11-30 PROCEDURE — G0152 HHCP-SERV OF OT,EA 15 MIN: HCPCS

## 2024-11-30 SDOH — ECONOMIC STABILITY: HOUSING INSECURITY: HOME SAFETY: NO FORMAL PRECAUTIONS FROM ANTERIOR APPROACH THR ON RIGHT SIDE

## 2024-11-30 ASSESSMENT — ENCOUNTER SYMPTOMS
PAIN LOCATION - PAIN FREQUENCY: CONSTANT
PAIN LOCATION - PAIN SEVERITY: 2/10
LOWEST PAIN SEVERITY IN PAST 24 HOURS: 3/10
PERSON REPORTING PAIN: PATIENT
PAIN LOCATION: RIGHT HIP
PAIN LOCATION - PAIN DURATION: WAXES AND WANES
SUBJECTIVE PAIN PROGRESSION: GRADUALLY IMPROVING
PAIN SEVERITY GOAL: 0/10
PAIN LOCATION - EXACERBATING FACTORS: CONSTIPATION
PAIN LOCATION - PAIN SEVERITY: 6/10
HIGHEST PAIN SEVERITY IN PAST 24 HOURS: 7/10
PAIN LOCATION: RIGHT HIP
PAIN: 1
PAIN LOCATION - PAIN FREQUENCY: CONSTANT

## 2024-11-30 ASSESSMENT — ACTIVITIES OF DAILY LIVING (ADL)
BATHING_WITHIN_DEFINED_LIMITS: 1
AMBULATION ASSISTANCE: STAND BY ASSIST
ENTERING_EXITING_HOME: MINIMUM ASSIST
AMBULATION ASSISTANCE: 1
OASIS_M1830: 03

## 2024-11-30 ASSESSMENT — PAIN DESCRIPTION - PAIN TYPE: TYPE: SURGICAL PAIN

## 2024-12-01 NOTE — CASE COMMUNICATION
44 y.o. female living in 2nd floor walk up with mario following r anterior approach hip replacement verbalizes 2/10 pain in hip verbalizing 6/10 abdominal pain from constipation with no BM since home. Patient had bariatric surgery earlier this year and is unable to take stool softeners. Able to get around apartment well with walker.  Primary Reason for Home Care:  restore safety and independence with ADL's, PT for HEP/ ther ex to rest ore ambulation to WNL  Skilled Needs:  ADL retraining, functional transfers and mobility  Instruction provided: Instructed in set up and sequence for tub transfers, safety with walker to reduce fall risk, instructed in s/s to report vs call 911. no fall or medication changes reported.  Medication reconciliation completed with pill bottles. Instructed to continue icing frequently and how to don and doff CARMEN hose with wearing schedule on  by day and off by night.  Patient and Caregiver response to instruction: good   Patient and Caregiver instructed on plan of care and visit frequency.   Patient and Caregiver in agreement with plan of care and visit frequency.    Discipline Communication: care team   Plan for next visit: NA. OT goals adequate for discharge. Patient feels well supported by daughter PT to evaluate.

## 2024-12-02 RX ORDER — ENOXAPARIN SODIUM 100 MG/ML
40 INJECTION SUBCUTANEOUS DAILY
Qty: 28 EACH | Refills: 0 | Status: SHIPPED | OUTPATIENT
Start: 2024-12-02 | End: 2024-12-30

## 2024-12-02 NOTE — PATIENT COMMUNICATION
Patient needs lovenox sent for post-op DVT prophylaxis. Patient doesn't want to take the aspirin despite it only being a short term RX.

## 2024-12-03 ENCOUNTER — HOME CARE VISIT (OUTPATIENT)
Dept: HOME HEALTH SERVICES | Facility: HOME HEALTH | Age: 44
End: 2024-12-03
Payer: COMMERCIAL

## 2024-12-04 ENCOUNTER — HOME CARE VISIT (OUTPATIENT)
Dept: HOME HEALTH SERVICES | Facility: HOME HEALTH | Age: 44
End: 2024-12-04
Payer: COMMERCIAL

## 2024-12-04 VITALS
HEART RATE: 61 BPM | RESPIRATION RATE: 14 BRPM | TEMPERATURE: 97.8 F | DIASTOLIC BLOOD PRESSURE: 83 MMHG | SYSTOLIC BLOOD PRESSURE: 102 MMHG

## 2024-12-04 PROCEDURE — G0151 HHCP-SERV OF PT,EA 15 MIN: HCPCS

## 2024-12-04 SDOH — HEALTH STABILITY: PHYSICAL HEALTH: EXERCISE COMMENTS: HEEL RAISES, MINI SQUATS AND STANDING KNEE FLEXION.

## 2024-12-04 SDOH — HEALTH STABILITY: PHYSICAL HEALTH
EXERCISE COMMENTS: ASSIGNED AP, HEEL SLIDES OR SEATED R KNEE FLEXION ROM ACTIVITIES AND WALK EVERY HOUR WHILE AWAKE AS TOLERATED.  PATIENT VOICED UNDERSTANDING.  HANDOUT PROVIDED.  PERFORMED 10 REPS EACH OF FOLLOWING: LAQ, STANDING HIP ABD, FLEX AND EXTENSION, STANDING

## 2024-12-04 ASSESSMENT — ENCOUNTER SYMPTOMS
LOSS OF SENSATION IN FEET: 0
PAIN LOCATION - PAIN SEVERITY: 4/10
SUBJECTIVE PAIN PROGRESSION: WAXING AND WANING
PERSON REPORTING PAIN: PATIENT
PAIN LOCATION - PAIN QUALITY: ACHE
HIGHEST PAIN SEVERITY IN PAST 24 HOURS: 6/10
DEPRESSION: 0
LOWEST PAIN SEVERITY IN PAST 24 HOURS: 4/10
PAIN LOCATION: RIGHT HIP
PAIN SEVERITY GOAL: 0/10
OCCASIONAL FEELINGS OF UNSTEADINESS: 1
PAIN LOCATION - PAIN FREQUENCY: CONSTANT
PAIN: 1

## 2024-12-04 ASSESSMENT — ACTIVITIES OF DAILY LIVING (ADL): AMBULATION ASSISTANCE ON FLAT SURFACES: 1

## 2024-12-06 ENCOUNTER — HOME CARE VISIT (OUTPATIENT)
Dept: HOME HEALTH SERVICES | Facility: HOME HEALTH | Age: 44
End: 2024-12-06
Payer: COMMERCIAL

## 2024-12-09 ENCOUNTER — HOME CARE VISIT (OUTPATIENT)
Dept: HOME HEALTH SERVICES | Facility: HOME HEALTH | Age: 44
End: 2024-12-09
Payer: COMMERCIAL

## 2024-12-09 VITALS — HEART RATE: 54 BPM | DIASTOLIC BLOOD PRESSURE: 68 MMHG | RESPIRATION RATE: 16 BRPM | SYSTOLIC BLOOD PRESSURE: 130 MMHG

## 2024-12-09 PROCEDURE — G0157 HHC PT ASSISTANT EA 15: HCPCS | Mod: CQ

## 2024-12-09 SDOH — HEALTH STABILITY: PHYSICAL HEALTH: EXERCISE TYPE: STANDING THERAPEUTIC EXERCISES

## 2024-12-09 SDOH — HEALTH STABILITY: PHYSICAL HEALTH
EXERCISE COMMENTS: PATIENT PARTICIPATED IN STANDING THERAPEUTIC EXERCISES. PATIENT DEMONSTRATED CIRCUMDUCTION WITH HIP FLEXION. PATIENT REQUIRED VERBAL CUES AND VISUAL DEMONSTRATION FOR PROPER TECHNIQUE. PATIENT WAS PROVIDED WITH WRITTEN HEP. PATIENT WAS ENCOURAGED TO

## 2024-12-09 SDOH — HEALTH STABILITY: PHYSICAL HEALTH: EXERCISE COMMENTS: FOCUS ON QUALITY OF MOVEMENT OVER QUANTITY.

## 2024-12-09 ASSESSMENT — ACTIVITIES OF DAILY LIVING (ADL)
AMBULATION_DISTANCE/DURATION_TOLERATED: 200 FEET
AMBULATION ASSISTANCE ON FLAT SURFACES: 1

## 2024-12-09 ASSESSMENT — ENCOUNTER SYMPTOMS
LOWEST PAIN SEVERITY IN PAST 24 HOURS: 2/10
PAIN: 1
PERSON REPORTING PAIN: PATIENT
PAIN LOCATION: RIGHT HIP
HIGHEST PAIN SEVERITY IN PAST 24 HOURS: 4/10
SUBJECTIVE PAIN PROGRESSION: GRADUALLY IMPROVING
PAIN LOCATION - PAIN QUALITY: ACHE, STIFF
PAIN LOCATION - PAIN FREQUENCY: CONSTANT
PAIN LOCATION - PAIN SEVERITY: 2/10

## 2024-12-13 ENCOUNTER — HOME CARE VISIT (OUTPATIENT)
Dept: HOME HEALTH SERVICES | Facility: HOME HEALTH | Age: 44
End: 2024-12-13
Payer: COMMERCIAL

## 2024-12-16 ENCOUNTER — HOME CARE VISIT (OUTPATIENT)
Dept: HOME HEALTH SERVICES | Facility: HOME HEALTH | Age: 44
End: 2024-12-16
Payer: COMMERCIAL

## 2024-12-16 PROCEDURE — G0157 HHC PT ASSISTANT EA 15: HCPCS | Mod: CQ

## 2024-12-16 SDOH — HEALTH STABILITY: PHYSICAL HEALTH: EXERCISE TYPE: STANDING THERAPEUTIC EXERCISES

## 2024-12-16 SDOH — HEALTH STABILITY: PHYSICAL HEALTH
EXERCISE COMMENTS: PATIENT PARTICIPATED IN STANDING THERAPEUTIC EXERCISES WITH NO ADVERSE RESPONSE. PATIENT WEAS EDUCATED ON WAYS TO REDUCE COMPENSATION WITH EXERCISES. PATIENT REQUIRED VERBAL CUES AND VISUAL DEMONSTRATION FOR PROPER TECHNIQUE.

## 2024-12-16 ASSESSMENT — ENCOUNTER SYMPTOMS
PERSON REPORTING PAIN: PATIENT
LOWEST PAIN SEVERITY IN PAST 24 HOURS: 4/10
HIGHEST PAIN SEVERITY IN PAST 24 HOURS: 7/10
PAIN LOCATION: RIGHT HIP
SUBJECTIVE PAIN PROGRESSION: UNCHANGED
PAIN: 1
PAIN LOCATION - PAIN QUALITY: ACHE, STIFF
PAIN LOCATION - PAIN SEVERITY: 4/10
PAIN LOCATION - PAIN FREQUENCY: CONSTANT

## 2024-12-16 ASSESSMENT — ACTIVITIES OF DAILY LIVING (ADL)
AMBULATION_DISTANCE/DURATION_TOLERATED: 200 FEET
AMBULATION ASSISTANCE ON FLAT SURFACES: 1

## 2024-12-17 ENCOUNTER — OFFICE VISIT (OUTPATIENT)
Dept: ORTHOPEDIC SURGERY | Facility: HOSPITAL | Age: 44
End: 2024-12-17
Payer: COMMERCIAL

## 2024-12-17 ENCOUNTER — HOSPITAL ENCOUNTER (OUTPATIENT)
Dept: RADIOLOGY | Facility: HOSPITAL | Age: 44
Discharge: HOME | End: 2024-12-17
Payer: COMMERCIAL

## 2024-12-17 DIAGNOSIS — Z96.641 STATUS POST RIGHT HIP REPLACEMENT: Primary | ICD-10-CM

## 2024-12-17 DIAGNOSIS — Z96.641 STATUS POST RIGHT HIP REPLACEMENT: ICD-10-CM

## 2024-12-17 PROCEDURE — 99211 OFF/OP EST MAY X REQ PHY/QHP: CPT

## 2024-12-17 PROCEDURE — 1036F TOBACCO NON-USER: CPT

## 2024-12-17 PROCEDURE — 73502 X-RAY EXAM HIP UNI 2-3 VIEWS: CPT | Mod: RIGHT SIDE | Performed by: RADIOLOGY

## 2024-12-17 PROCEDURE — 73502 X-RAY EXAM HIP UNI 2-3 VIEWS: CPT | Mod: RT

## 2024-12-17 RX ORDER — OMEPRAZOLE 20 MG/1
CAPSULE, DELAYED RELEASE ORAL
COMMUNITY
Start: 2024-06-26

## 2024-12-17 RX ORDER — CALCIUM CITRATE/VITAMIN D3 315MG-6.25
TABLET ORAL
COMMUNITY
Start: 2024-11-13

## 2024-12-17 ASSESSMENT — PAIN - FUNCTIONAL ASSESSMENT: PAIN_FUNCTIONAL_ASSESSMENT: 0-10

## 2024-12-17 ASSESSMENT — PAIN DESCRIPTION - DESCRIPTORS: DESCRIPTORS: TIGHTNESS;OTHER (COMMENT)

## 2024-12-17 ASSESSMENT — PAIN SCALES - GENERAL: PAINLEVEL_OUTOF10: 5 - MODERATE PAIN

## 2024-12-17 NOTE — PROGRESS NOTES
Subjective    Patient ID: Tom Trimble is a 44 y.o. female.    Chief Complaint: Post-op of the Right Hip     Last Surgery: Arthroplasty Total Hip Anterior Approach - Right - Right  Last Surgery Date: 11/27/2024    HPI  Patient is a 44 y.o. female who is s/p right direct anterior total hip arthroplasty.  Date of surgery was 11/27/2024. Patient continues to be weight bearing as tolerated on the right leg with a walker at this time and denies issues with incision. Has been covering the incision daily due to overhanging skin going over the incision. Patient continues on ASA for DVT ppx. Patient continues with therapy sessions, performing exercise program at home. Patient denies fever or chills, N/T or calf pain.     ROS: All other systems have been reviewed and are negative except as previously noted in history of present illness.        IMP:  Problem List Items Addressed This Visit    None  Visit Diagnoses       Status post right hip replacement    -  Primary    Relevant Orders    XR hip right with pelvis when performed 2 or 3 views          Objective   General: Alert and oriented x 3, NAD, respirations easy and unlabored with no audible wheezes, skin warm and dry, speech and dress appropriate for noted age, affect euthymic.     Musculoskeletal: right Lower Extremity  incisions c/d/i  mild swelling to lower leg  5 cm by 5 cm area of fluctuance  compartments soft  no calf tenderness  sensation intact to light touch  motor intact including TA/GS/EHL  palpable DP/PT pulses 2+     X-ray: Images of right hip reviewed personally by me today and reveal maintenance of alignment of prosthesis with hardware in position and no interval change. No loosening noted. No lucency. Stable appearance.      Assessment/Plan   Encounter Diagnoses:  Status post right hip replacement    PLAN: Patient is s/p right direct anterior total hip arthroplasty. Patient is overall doing well. She is weight bearing as tolerated on the right leg  with a walker at this time. She continues to work with home PT. She keeps her incision covered at all times due to overlaying skin overlapping the incision. On exam, 5 cm by 5 cm area of fluctuance noted at distal incision. Imaging reveals a stable right hip prosthesis. Patient is educated that the area of fluctuance noted is most likely a hematoma. This can either get reabsorbed eventually in the body or we can drain it today in the office. Patient would like to proceed with draining in the office. The patient is in agreement with this plan. The distal incision was prepped in a sterile fashion using chloraprep. A 60 ml syringe and 18 gauge needle was used to drain the hematoma. 130 ml of bright red liquid was drained from the area. There was a noticeable reduction to the area after drainage. Patient is educated that this confirms our diagnosis of a hematoma. She should apply compression dressing and ice to reduce reoccurrence of the hematoma. Patient will continue to work with home PT on hip and knee ROM, quad strengthening, gait training, and weight bearing. Patient will follow up in 3 months. Patient is in agreement with this plan.     Orders Placed This Encounter    XR hip right with pelvis when performed 2 or 3 views     No follow-ups on file.

## 2024-12-19 ENCOUNTER — HOME CARE VISIT (OUTPATIENT)
Dept: HOME HEALTH SERVICES | Facility: HOME HEALTH | Age: 44
End: 2024-12-19
Payer: COMMERCIAL

## 2024-12-19 VITALS — RESPIRATION RATE: 16 BRPM | DIASTOLIC BLOOD PRESSURE: 60 MMHG | SYSTOLIC BLOOD PRESSURE: 104 MMHG

## 2024-12-19 PROCEDURE — G0157 HHC PT ASSISTANT EA 15: HCPCS | Mod: CQ

## 2024-12-19 SDOH — HEALTH STABILITY: PHYSICAL HEALTH: EXERCISE COMMENTS: PATIENT DEMONSTRATED INDEPENDENCE IN HER HEP, SHE HAS NO QUESTIONS AT THIS TIME.

## 2024-12-19 SDOH — HEALTH STABILITY: PHYSICAL HEALTH: EXERCISE TYPE: STANDING THERAPEUTIC EXERCISES

## 2024-12-19 ASSESSMENT — ENCOUNTER SYMPTOMS
PAIN: 1
LOWEST PAIN SEVERITY IN PAST 24 HOURS: 0/10
PAIN LOCATION - PAIN QUALITY: ACHE, STIFF
PAIN LOCATION - PAIN FREQUENCY: INTERMITTENT
PERSON REPORTING PAIN: PATIENT
SUBJECTIVE PAIN PROGRESSION: RAPIDLY IMPROVING
PAIN LOCATION - PAIN SEVERITY: 1/10
PAIN LOCATION: RIGHT HIP
HIGHEST PAIN SEVERITY IN PAST 24 HOURS: 1/10

## 2024-12-19 ASSESSMENT — ACTIVITIES OF DAILY LIVING (ADL)
AMBULATION_DISTANCE/DURATION_TOLERATED: 150 FEET
AMBULATION ASSISTANCE ON FLAT SURFACES: 1

## 2024-12-24 ENCOUNTER — HOME CARE VISIT (OUTPATIENT)
Dept: HOME HEALTH SERVICES | Facility: HOME HEALTH | Age: 44
End: 2024-12-24
Payer: COMMERCIAL

## 2024-12-24 VITALS
RESPIRATION RATE: 13 BRPM | SYSTOLIC BLOOD PRESSURE: 108 MMHG | DIASTOLIC BLOOD PRESSURE: 79 MMHG | HEART RATE: 93 BPM | TEMPERATURE: 98.9 F

## 2024-12-24 PROCEDURE — G0151 HHCP-SERV OF PT,EA 15 MIN: HCPCS

## 2024-12-24 SDOH — HEALTH STABILITY: PHYSICAL HEALTH
EXERCISE COMMENTS: PERFORMED 15 REPS EACH OF FOLLOWING: LAQ, STANDING HIP ABD, FLEX AND EXTENSION, STANDING HEEL RAISES, MINI SQUATS AND STANDING KNEE FLEXION.

## 2024-12-24 ASSESSMENT — ENCOUNTER SYMPTOMS
PAIN LOCATION - PAIN QUALITY: STIFF
OCCASIONAL FEELINGS OF UNSTEADINESS: 1
PAIN LOCATION - PAIN SEVERITY: 1/10
PAIN: 1
PERSON REPORTING PAIN: PATIENT
LOWEST PAIN SEVERITY IN PAST 24 HOURS: 0/10
PAIN LOCATION: RIGHT HIP
DEPRESSION: 0
LIMITED RANGE OF MOTION: 1
PAIN LOCATION - PAIN FREQUENCY: INTERMITTENT
PAIN SEVERITY GOAL: 0/10
HIGHEST PAIN SEVERITY IN PAST 24 HOURS: 1/10
LOSS OF SENSATION IN FEET: 0
MUSCLE WEAKNESS: 1

## 2024-12-24 ASSESSMENT — ACTIVITIES OF DAILY LIVING (ADL)
OASIS_M1830: 00
HOME_HEALTH_OASIS: 00
AMBULATION ASSISTANCE ON FLAT SURFACES: 1

## 2025-01-06 ENCOUNTER — APPOINTMENT (OUTPATIENT)
Dept: DERMATOLOGY | Facility: CLINIC | Age: 45
End: 2025-01-06
Payer: COMMERCIAL

## 2025-01-14 ENCOUNTER — APPOINTMENT (OUTPATIENT)
Dept: BEHAVIORAL HEALTH | Facility: CLINIC | Age: 45
End: 2025-01-14
Payer: COMMERCIAL

## 2025-01-14 DIAGNOSIS — Z72.4 PROBLEMS RELATED TO INAPPROPRIATE DIET AND EATING HABITS: ICD-10-CM

## 2025-01-14 DIAGNOSIS — F43.0 STRESS REACTION: ICD-10-CM

## 2025-01-14 DIAGNOSIS — F41.9 ANXIETY: ICD-10-CM

## 2025-01-14 DIAGNOSIS — F43.89 REACTION TO CHRONIC STRESS: ICD-10-CM

## 2025-01-14 DIAGNOSIS — Z98.84 S/P GASTRIC BYPASS: ICD-10-CM

## 2025-01-14 PROCEDURE — 90837 PSYTX W PT 60 MINUTES: CPT | Performed by: PSYCHOLOGIST
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
## 2025-01-15 ENCOUNTER — APPOINTMENT (OUTPATIENT)
Dept: PHYSICAL THERAPY | Facility: CLINIC | Age: 45
End: 2025-01-15
Payer: COMMERCIAL

## 2025-01-16 PROBLEM — Z72.4 PROBLEMS RELATED TO INAPPROPRIATE DIET AND EATING HABITS: Status: ACTIVE | Noted: 2025-01-16

## 2025-01-16 PROBLEM — F43.0 STRESS REACTION: Status: ACTIVE | Noted: 2025-01-16

## 2025-01-16 PROBLEM — F41.9 ANXIETY: Status: ACTIVE | Noted: 2025-01-16

## 2025-01-22 ENCOUNTER — APPOINTMENT (OUTPATIENT)
Dept: PHYSICAL THERAPY | Facility: CLINIC | Age: 45
End: 2025-01-22
Payer: COMMERCIAL

## 2025-01-29 ENCOUNTER — APPOINTMENT (OUTPATIENT)
Dept: PHYSICAL THERAPY | Facility: CLINIC | Age: 45
End: 2025-01-29
Payer: COMMERCIAL

## 2025-02-05 ENCOUNTER — APPOINTMENT (OUTPATIENT)
Dept: PHYSICAL THERAPY | Facility: CLINIC | Age: 45
End: 2025-02-05
Payer: COMMERCIAL

## 2025-02-06 ENCOUNTER — APPOINTMENT (OUTPATIENT)
Dept: PRIMARY CARE | Facility: CLINIC | Age: 45
End: 2025-02-06
Payer: COMMERCIAL

## 2025-03-04 ENCOUNTER — APPOINTMENT (OUTPATIENT)
Dept: ORTHOPEDIC SURGERY | Facility: HOSPITAL | Age: 45
End: 2025-03-04
Payer: COMMERCIAL

## 2025-03-11 ENCOUNTER — APPOINTMENT (OUTPATIENT)
Dept: BEHAVIORAL HEALTH | Facility: CLINIC | Age: 45
End: 2025-03-11
Payer: COMMERCIAL

## 2025-03-13 ENCOUNTER — APPOINTMENT (OUTPATIENT)
Dept: PLASTIC SURGERY | Facility: CLINIC | Age: 45
End: 2025-03-13
Payer: COMMERCIAL

## 2025-04-14 NOTE — PATIENT INSTRUCTIONS
"The following are the recommendations we discussed at your appointment today:  1. Nutrition  - Please make sure you are seeing the bariatric dietitian regularly.    Dietitian Information:   Rody Martinez: [phone]  [facility] - [place] [phone]    Your Protein Goals will gradually increase as you get further out from surgery:  0-3 months - 60 GRAMS PER DAY  3-6 months - 60-75 GRAMS PER DAY  6-12 months - 75-90 GRAMS PER DAY  12+ months - 80-90 GRAMS PER DAY    - Follow Pouch Rules;.   Stop drinking 30 minutes before your meals, Take 30 minutes to eat your meal   - NO DRINKING DURING MEALS, Wait for 30 minutes after your meal to drink  - Eat 5 servings of fruits and veggies daily.  A serving is 1 small (tennis ball size) piece of whole fruit, 1/2 cup fresh fruit, 1 cup non starchy vegetables  - Eat 3 small meals and 1-2 healthy, protein rich, snacks per day.    EAT PROTEIN FIRST AT MEALS, 2nd non starchy vegetable or fruit serving, consume starches last and aim for whole grains of small portion.  This pattern of eating ensures you are getting full on high quality protein and higher fiber foods with many vitamins and minerals to support adequate nutrition first.      2. Exercise Recommendations:    Regular physical activity is CRITICAL for long term successful weight management.    Strive for a minimum weekly exercise goal of at least 200 minutes per week of aerobic exercise (45 minutes at least 5 days per week or 30 minutes daily)    Strength based resistance training is critical for helping to maintain and build lean body mass/muscle mass which is very important for long term health.  Having higher muscle mass is associated with better health outcomes and can help to maintain higher calorie expenditure.      Designing a Strength Program:  Select 3-4 exercises that target different major muscle groups.  - Emphasize the following movements \"pull, push, squat, hip hinge\"  \"Pull\" examples - pull up, " "bent over row or dumbbell row, pull down with weight bar or resistance band  \"Push\" examples - push up, bench press, chest flys, dips, overhead press, dumbbell bench press  \"Squat\" examples - air squat, chair squat, lunge steps, goblet squat, front squat, etc  \"Hip hinge\" examples - kettle bell swing, good morning, glute bridge, deadlift, suitcase pick ups, hip thrust    Perform two to three sets of eight to 15 repetitions of each exercise.  Try to use a resistance that feels like an \"8 out of 10\" effort, with 10 being the highest effort you can give.    To get stronger, try increasing either the weight, number of repetitions, number of sets or number of exercises every 4-8 weeks as you feel more comfortable with your current program.      3. Fluids  - Drink at least 60 oz of water every day.   - Wait 30 minutes before or after meals to drink fluids.  - Avoid carbonated beverages.    4. Vitamins  - Remember to take your multivitamins 2 times daily, once in the morning and once in the evening  - Take your calcium 2-3 times daily, at least 2 hours apart from the multivitamin - total daily dose at least 1200-1500mg per day.    - Vitamin D3 3000 units per day  - B12 - depending on your blood work and how well you absorb B12 from your multivitamin you may need additional B12 of 350-500mcg oral per day.    5. Labs  - We will check labs today and results will be communicated via [facility] My Chart  - A copy of the results can be viewed on  My Chart.      Follow-up with Dietitian for bariatric diet optimization  Follow-up with PCP for general health questions and ongoing management of routine health concerns      "

## 2025-04-14 NOTE — PROGRESS NOTES
"Follow Up Bariatric Nutrition Assessment                  Name: Shoneia M Collins  MRN: [id number]  Date: 04/16/25    Surgery Date:  4/22/24  Surgeon:  Thong  Procedure:  RNYGB    ASSESSMENT:  Current weight:      Vitals:    04/16/25 1334   Weight: 105 kg (232 lb)                 Ht: 1.702 m (5' 7\")  BMI:  Body mass index is 36.34 kg/m².  Previous weight:  250lbs  Initial start weight:  339lbs  EBW: 180lbs  Total weight change:  107lbs  %EBW Lost: 59.4%    PROGRESS:  Nutrition Interventions for last encounter    Eat 70-80 g of protein per day. Limit yourself to 1 protein drink a day.   Have some fruits and veggies for snacks.   Try to do things to get your mind off of snacking at night.  Drink 64 oz. of zero calorie beverages per day  Be sure to avoid drinking 30 min before, during the meal and for 30 minutes after the meal  Look into starting bungee fit.   Continue current vit/min regimen  Schedule an appointment with psych       CHANGES IN TREATMENT:   Patient met goals: partially     24 hour food recall:   Breakfast:  high protein yogurt or protein shake   Snack:   Lunch: salad w/ribs   Snack:     Dinner:  meat w/veggies   Snack: leftovers   Beverages: water, coffee, diet Gingerale   Alcohol: 1-2x per month       Vitamins:   Celebrate w/B12 or MVI 2x and B12,  1200-1500mg total Calcium citrate, biotin, iron   Medications:   Current Outpatient Medications:     calcium citrate-vitamin D3 315 mg-6.25 mcg (250 unit) tablet, , Disp: , Rfl:     calcium citrate-vitamin D3 500 mg-12.5 mcg (500 unit) tablet,chewable, Chew 1,500 mg once daily., Disp: 270 tablet, Rfl: 3    chlorhexidine (Hibiclens) 4 % external liquid, Use as a wash to affected areas once daily, Disp: 473 mL, Rfl: 11    chlorhexidine (Peridex) 0.12 % solution, Swish and spit. Do not swallow. Use the night before and morning of surgery as directed, Disp: 15 mL, Rfl: 0    clindamycin (Cleocin T) 1 % lotion, Apply topically if needed (for HS)., Disp: 60 mL, Rfl: " 7    diclofenac sodium (Voltaren) 1 % gel, Apply 4.5 inches (4 g) topically 4 times a day., Disp: 100 g, Rfl: 1    dicyclomine (Bentyl) 10 mg capsule, Take 1 capsule (10 mg) by mouth 4 times a day before meals. Take with meals and at bedtime, Disp: 120 capsule, Rfl: 11    Fiber Therapy, m-cellulose, 500 mg tablet, , Disp: , Rfl:     fluticasone (Flonase) 50 mcg/actuation nasal spray, Administer into affected nostril(s) if needed., Disp: , Rfl:     folic acid (Folvite) 1 mg tablet, TAKE ONE (1) TABLET (1 MG) BY MOUTH ONCE DAILY., Disp: 90 tablet, Rfl: 0    ketoconazole (NIZOral) 2 % cream, Apply topically if needed., Disp: , Rfl:     lidocaine (Lidoderm) 5 % patch, Place 1 patch over 12 hours on the skin once daily. Apply to painful area 12 hours per day, remove for 12 hours., Disp: 30 patch, Rfl: 3    loratadine (Claritin Reditabs) 10 mg disintegrating tablet, Dissolve 1 tablet (10 mg) in the mouth once daily., Disp: , Rfl:     multivitamin tablet, TAKE ONE (1) TABLET BY MOUTH ONCE DAILY., Disp: 90 tablet, Rfl: 0    omeprazole (PriLOSEC) 20 mg DR capsule, , Disp: , Rfl:     omeprazole (PriLOSEC) 40 mg DR capsule, Take 1 capsule (40 mg) by mouth once daily in the morning. Take before meals. Do not crush or chew. Open capsule, sprinkle beads on SF jello, pudding or applesauce., Disp: 30 capsule, Rfl: 5    pantoprazole (ProtoNix) 40 mg EC tablet, Take 1 tablet (40 mg) by mouth once daily. Do not crush, chew, or split., Disp: 30 tablet, Rfl: 0    tiZANidine (Zanaflex) 4 mg capsule, Take 1 capsule (4 mg) by mouth once daily at bedtime., Disp: 30 capsule, Rfl: 11    Vitamin B-12 500 mcg tablet, TAKE ONE (1) TABLET (500 MCG) BY MOUTH ONCE DAILY., Disp: 90 tablet, Rfl: 0  Physical Activity: PT homework exercises daily      READINESS TO LEARN:  Motivation to learn:  Interested      Understanding of instruction: Good       Anticipated Compliance: Good       Family Support: Unable to assess-family not present     Patient  presents with post-op weight loss surgery gastric bypass. Pt is 1 year post op.   Patient has lost 107 pounds since initial assessment accounting for 59.4% loss excess body weight. Got hip surgery in November of 2024.  Should be getting cleared to start exercising soon. Has been at her current weight since Feb. Tolerating regular diet without difficulty.  Protein intake is adequate for post-op individual. Fluid consumption is adequate. Patient is supplementing recommended vitamin/minerals. Pt states no concerns/difficulties.    Malnutrition Screening  Significant unintentional weight loss? n/a  Eating less than 75% of usual intake for more than 2 weeks? n/a     Nutrition Diagnosis:   Increased protein and nutrition needs related to altered GI function as evidenced by pt. s/p gastric bypass.  Food- and nutrition-related knowledge deficit related to lack of prior exposure to surgical weight loss information as evidenced by diet recall.     Nutrition Interventions:   Modify type and amount of food and nutrients within meals and snacks.  Comprehensive Nutrition Education  -Nutrition education materials:         Recommendations:    Eat 70-80 g of protein per day. Choose low fat proteins.   Drink 64 oz. of zero calorie beverages per day  Continue no drinking 30 min before, during the meal and for 30 minutes after the meal  Begin set exercises once you are cleared to do so. Increase intensity and duration of exercise  Continue current vit/min regimen    Nutrition Monitoring and Evaluation:   1-2 pounds weight loss per week  Criteria: weight check, food recall  Need for Follow-up: prn or annually     Leela Chatterjee MS, RD, LD  Phone: [phone]

## 2025-04-14 NOTE — ASSESSMENT & PLAN NOTE
Patient is doing well   one year s/p RYGB  no acute issues   takes supplements   does regular exercise  no pain or GERD   Not on PPI    Recs:     doing well  No acute issues   advised to continue Diet, exercise,  portion control   visual cues for portion control   Regular FU with dietitian   continue life long vitamin supplementation   support groups  will check labs  FU 6 months

## 2025-04-14 NOTE — PROGRESS NOTES
BARIATRIC SURGERY CLINIC  Comprehensive Weight Management          Name: Shoneia M Collins  MRN: [id number]     Index Surgery  Date of Surgery: 4/22/24   Surgeon: Luis Armando Benito MD  Surgical Procedure: Laparoscopic bakari en y gastric bypass 94285    Virtual or Telephone Consent    An interactive audio and video telecommunication system which permits real time communications between the patient (at the originating site) and provider (at the distant site) was utilized to provide this telehealth service.   Verbal consent was requested and obtained from Shoneia M Collins on this date, 04/14/25 for a telehealth visit and the patient's location was confirmed at the time of the visit.      HPI   Shoneia M Collins is a 45 y.o. female presenting for annual pov s/p RYGB. Visit prior doing great with progress! Meeting protein and fluid goals and weight loss is steady.     Diet:  - Stage: regular food diet  - Achieving protein and fluid goals most days: yes     Exercise:  - walking, increasing activity as tolerated.  - physical therapy now for hip, having THR when weight is ~245 lbs    Concerns related to:  Nausea/Vomiting, Reflux: denies  Abdominal Pain: denies  Diarrhea/Constipation- bowel function is regular    Daily Supplements:  Calcium: Calcium Citrate w/ vitamin D (1200 - 1500mg)  Multivitamin & Minerals: 2 per day  Vitamin B12: 500 mcg/day   Vitamin D3: 3000 units   Iron/Other: iron supplement   PPI: n/a    Primary Medical Hx:  Patient Active Problem List   Diagnosis    Anemia    Arthritis    Cervical lymphadenopathy    Snoring    Hidradenitis suppurativa    Class 3 severe obesity due to excess calories without serious comorbidity with body mass index (BMI) of 50.0 to 59.9 in adult    Mass of breast, right    Primary osteoarthritis of both hips    Sleep apnea    Tinea corporis    Varicose veins of leg with edema, bilateral    Vitamin B12 deficiency    Vitamin D deficiency    Thrombophlebitis    S/P GAVIN (total abdominal  hysterectomy)    Left lower quadrant abdominal pain    Back pain    Hemorrhage of varicose veins of left lower extremity    Hyperglycemia    Chronic left shoulder pain    Lingual tonsil hypertrophy    Smell and taste disorder    Subacromial bursitis of left shoulder joint    Varicose veins with pain    Inadequate caloric intake    Inadequate dietary caloric intake    S/P gastric bypass    Umbilical hernia without obstruction and without gangrene    Use of cannabis    Eating disorder in remission    Reaction to chronic stress    History of marijuana use    Cannabis use, unspecified, in remission    Generalized abdominal pain    Localized swelling of left lower leg    Strain of muscle of hip    Morbid obesity (Multi)    Morbid obesity due to excess calories (Multi)    Intestinal malabsorption following gastrectomy ([facility])    Dietary counseling    Primary osteoarthritis of right hip    Status post hip replacement, right    Anxiety    Problems related to inappropriate diet and eating habits    Stress reaction      Past Surgical History:   Procedure Laterality Date    BARIATRIC SURGERY  2024    [place] [facility] [place], [place]IC  [place]     Section     SECTION, LOW TRANSVERSE      x2    CHOLECYSTECTOMY      GALLBLADDER SURGERY  11/10/2014    Gallbladder Surgery    HYSTERECTOMY      OTHER SURGICAL HISTORY  2021    Laparoscopic hysterectomy    OTHER SURGICAL HISTORY  2021    Bilateral salpingectomy    OTHER SURGICAL HISTORY  2021    Cystoscopy    TONSILLECTOMY      TUBAL LIGATION  2017    Tubal Ligation      Social History     Socioeconomic History    Marital status: Single     Spouse name: Not on file    Number of children: Not on file    Years of education: Not on file    Highest education level: Not on file   Occupational History    Not on file   Tobacco Use    Smoking status: Former     Current packs/day: 0.00     Average packs/day: 1 pack/day  for 2.0 years (2.0 ttl pk-yrs)     Types: Cigarettes     Start date:      Quit date:      Years since quittin.2     Passive exposure: Never    Smokeless tobacco: Never    Tobacco comments:     24:  Verified verbally [facility]   Vaping Use    Vaping status: Never Used   Substance and Sexual Activity    Alcohol use: Not Currently     Comment: 24: Verbalizes none since surgery hx socially, rarely  [facility]    Drug use: Yes     Types: Marijuana     Comment: 24:  Denies current [facility] LPN    Sexual activity: Yes     Partners: Male     Birth control/protection: Female Sterilization, Condom Male   Other Topics Concern    Not on file   Social History Narrative    Not on file     Social Drivers of Health     Financial Resource Strain: High Risk (2024)    Overall Financial Resource Strain (CARDIA)     Difficulty of Paying Living Expenses: Very hard   Food Insecurity: Food Insecurity Present (2024)    Hunger Vital Sign     Worried About Running Out of Food in the Last Year: Sometimes true     Ran Out of Food in the Last Year: Sometimes true   Transportation Needs: No Transportation Needs (2024)    OASIS : Transportation     Lack of Transportation (Medical): No     Lack of Transportation (Non-Medical): No     Patient Unable or Declines to Respond: No   Recent Concern: Transportation Needs - Unmet Transportation Needs (2024)    PRAPARE - Transportation     Lack of Transportation (Medical): No     Lack of Transportation (Non-Medical): Yes   Physical Activity: Not on file   Stress: Not on file   Social Connections: Feeling Socially Integrated (2024)    OASIS : Social Isolation     Frequency of experiencing loneliness or isolation: Never   Intimate Partner Violence: Not At Risk (2024)    Humiliation, Afraid, Rape, and Kick questionnaire     Fear of Current or Ex-Partner: No     Emotionally Abused: No     Physically Abused: No     Sexually Abused: No   Housing  Stability: High Risk (2024)    Housing Stability Vital Sign     Unable to Pay for Housing in the Last Year: Yes     Number of Times Moved in the Last Year: 0     Homeless in the Last Year: No     Family History   Problem Relation Name Age of Onset    Lung disease Mother      Diabetes Paternal Grandmother      Diabetes Other        Current Outpatient Medications on File Prior to Visit   Medication Sig Dispense Refill    calcium citrate-vitamin D3 315 mg-6.25 mcg (250 unit) tablet       calcium citrate-vitamin D3 500 mg-12.5 mcg (500 unit) tablet,chewable Chew 1,500 mg once daily. 270 tablet 3    chlorhexidine (Hibiclens) 4 % external liquid Use as a wash to affected areas once daily 473 mL 11    chlorhexidine (Peridex) 0.12 % solution Swish and spit. Do not swallow. Use the night before and morning of surgery as directed 15 mL 0    clindamycin (Cleocin T) 1 % lotion Apply topically if needed (for HS). 60 mL 7    diclofenac sodium (Voltaren) 1 % gel Apply 4.5 inches (4 g) topically 4 times a day. 100 g 1    dicyclomine (Bentyl) 10 mg capsule Take 1 capsule (10 mg) by mouth 4 times a day before meals. Take with meals and at bedtime 120 capsule 11    [] ferrous sulfate, 325 mg ferrous sulfate, tablet Take 1 tablet by mouth once daily. 90 tablet 3    Fiber Therapy, m-cellulose, 500 mg tablet       fluticasone (Flonase) 50 mcg/actuation nasal spray Administer into affected nostril(s) if needed.      folic acid (Folvite) 1 mg tablet TAKE ONE (1) TABLET (1 MG) BY MOUTH ONCE DAILY. 90 tablet 0    ketoconazole (NIZOral) 2 % cream Apply topically if needed.      lidocaine (Lidoderm) 5 % patch Place 1 patch over 12 hours on the skin once daily. Apply to painful area 12 hours per day, remove for 12 hours. 30 patch 3    loratadine (Claritin Reditabs) 10 mg disintegrating tablet Dissolve 1 tablet (10 mg) in the mouth once daily.      multivitamin tablet TAKE ONE (1) TABLET BY MOUTH ONCE DAILY. 90 tablet 0     "omeprazole (PriLOSEC) 20 mg DR capsule       omeprazole (PriLOSEC) 40 mg DR capsule Take 1 capsule (40 mg) by mouth once daily in the morning. Take before meals. Do not crush or chew. Open capsule, sprinkle beads on SF jello, pudding or applesauce. 30 capsule 5    pantoprazole (ProtoNix) 40 mg EC tablet Take 1 tablet (40 mg) by mouth once daily. Do not crush, chew, or split. 30 tablet 0    tiZANidine (Zanaflex) 4 mg capsule Take 1 capsule (4 mg) by mouth once daily at bedtime. 30 capsule 11    Vitamin B-12 500 mcg tablet TAKE ONE (1) TABLET (500 MCG) BY MOUTH ONCE DAILY. 90 tablet 0     No current facility-administered medications on file prior to visit.      Allergies   Allergen Reactions    Pollen Extracts Runny nose     REVIEW OF SYSTEMS:  Negative unless otherwise reviewed in HPI.    PHYSICAL EXAM   Physical Exam   Ht: 5'7\"  Wt: 252 lbs ***  BMI: ***  Alert, well appearing, no acute distress, nourished, hydrated.  Anicteric sclera, no ptosis  Facial symmetry  Neck supple  Unlabored respirations.  Easily conversant without increased respiratory effort  Oriented to person, place, time.  Judgement intact.  Appropriate mood, affect.     ASSESSMENT & PLAN ***  45 y.o. female presenting for annual pov s/p RYGB. Nearing goal weight for THR. Doing well with steady weight loss. Requests referral to plastics for evaluation HS and skin removal. Taking appropriate supplements. Counseled on diet, exercise and lifestyle modifications. Labs reviewed with patient and supplements adjusted as needed. Follow up in 6 months, sooner if needed.    Weight Impression:  -Initial Weight: 339 lbs   -Today's Weight: 252 lbs ***  -%Total Weight Loss: -126.00 % ***     Problem List Items Addressed This Visit       Sleep apnea     - reports improved sleep with steady weight loss  - continue to follow with sleep medicine as needed  - continue to wear CPAP or BiPAP if ordered at prescribed settings  - reviewed practices to promote adequate " sleep hygiene         S/P gastric bypass     Patient is doing well   one year s/p RYGB  no acute issues   takes supplements   does regular exercise  no pain or GERD   Not on PPI    Recs:     doing well  No acute issues   advised to continue Diet, exercise,  portion control   visual cues for portion control   Regular FU with dietitian   continue life long vitamin supplementation   support groups  will check labs  FU 6 months           Intestinal malabsorption following gastrectomy ([facility])     Check micronutrient levels - see orders  Adjust micronutrient supplementation per results  Continue recommended post bariatric surgery supplements         Dietary counseling - Primary     - Counseled on benefits of increased regular exercise, both aerobic & resistance training.  Reviewed benefits of resistance training to enhance/maintain lean body mass.  - Counseled on dietary modifications to support weight reduction, reduced calorie diet, encourage adequate protein, increased dietary fiber from fruit and vegetable to improve appetite/satiety regulation and quality of diet.  Discussed impact of processed foods and liquid calories to weight gain & contribution to dysfunctional appetite signals.   -  Reviewed importance of intensification of lifestyle therapy in weight reduction and maintenance, set behavioral modification goals of nutrition, physical activity or behavioral practices to benefit weight reduction.    - Discussed self monitoring practices to ensure reduce calorie diet, emphasizing increased dietary protein & fiber.  Reviewed protein targets per meal as recommendation to help with satiety and lean mass maintenance.          Please see Patient Instructions for today's relevant referrals, orders and instructions. Preventative health counseling for fifteen minutes face to face for diet, exercise and lifestyle modifications for the promotion of sustained and ongoing weight loss.      Follow Up: No follow-ups on file.      Courtney Addison, APRN-CNP

## 2025-04-14 NOTE — ASSESSMENT & PLAN NOTE
- reports improved sleep with steady weight loss  - continue to follow with sleep medicine as needed  - continue to wear CPAP or BiPAP if ordered at prescribed settings  - reviewed practices to promote adequate sleep hygiene

## 2025-04-15 ENCOUNTER — APPOINTMENT (OUTPATIENT)
Dept: SURGERY | Facility: CLINIC | Age: 45
End: 2025-04-15
Payer: COMMERCIAL

## 2025-04-15 DIAGNOSIS — G47.33 OBSTRUCTIVE SLEEP APNEA SYNDROME: ICD-10-CM

## 2025-04-15 DIAGNOSIS — K91.2 INTESTINAL MALABSORPTION FOLLOWING GASTRECTOMY (HHS-HCC): ICD-10-CM

## 2025-04-15 DIAGNOSIS — Z90.3 INTESTINAL MALABSORPTION FOLLOWING GASTRECTOMY (HHS-HCC): ICD-10-CM

## 2025-04-15 DIAGNOSIS — Z98.84 S/P GASTRIC BYPASS: ICD-10-CM

## 2025-04-15 DIAGNOSIS — Z71.3 DIETARY COUNSELING: Primary | ICD-10-CM

## 2025-04-16 ENCOUNTER — APPOINTMENT (OUTPATIENT)
Dept: SURGERY | Facility: CLINIC | Age: 45
End: 2025-04-16
Payer: COMMERCIAL

## 2025-04-16 VITALS — WEIGHT: 232 LBS | HEIGHT: 67 IN | BODY MASS INDEX: 36.41 KG/M2

## 2026-04-16 ENCOUNTER — APPOINTMENT (OUTPATIENT)
Dept: SURGERY | Facility: CLINIC | Age: 46
End: 2026-04-16
Payer: COMMERCIAL

## (undated) DEVICE — LIGASURE, L-HOOK 44CM SEALER/DIVIDER LAP, MARYLAND

## (undated) DEVICE — COVER, CART, 45 X 27 X 48 IN, CLEAR

## (undated) DEVICE — STAPLER, LINEAR ENDO RELOAD, 60MM, BLUE, DISP

## (undated) DEVICE — TROCAR SYSTEM, BALLOON, KII GELPORT, 12 X 100MM

## (undated) DEVICE — Device

## (undated) DEVICE — MANIFOLD, 4 PORT NEPTUNE STANDARD

## (undated) DEVICE — STAPLELINE, BIOABSORB, SEAMGUARD, 60

## (undated) DEVICE — SYRINGE, 20 CC, LUER LOCK, MONOJECT, W/O CAP, LF

## (undated) DEVICE — RELOAD, ENDOSTITCH, SURGIDAC, 2-0, 48 IN, GREEN, SULU

## (undated) DEVICE — DRAPE, INSTRUMENT, W/POUCH, STERI DRAPE, 9 5/8 X 18 LONG

## (undated) DEVICE — INTERPULSE HANDPIECE SET W/ 10FT SUCTION TUBING

## (undated) DEVICE — CLOSURE SYSTEM, DERMABOND, PRINEO, 22CM, STERILE

## (undated) DEVICE — SUTURE, ETHIBOND, 5, 30 IN, V40, MULTIPACK, GREEN

## (undated) DEVICE — SCISSOR, MINI ENDO CUT, TIPS, DISP

## (undated) DEVICE — ELECTRODE, ELECTROSURGICAL, BLADE, INSULATED, ENT/IMA, STERILE

## (undated) DEVICE — TUBE SET, PNEUMOCLEAR, SMOKE EVACU, HIGH-FLOW

## (undated) DEVICE — COVER, C-ARM W/CLIPS, OEC GE

## (undated) DEVICE — TROCAR, OPTICAL BLADELESS 5MM X 100 W/ADVANCED FIXATION

## (undated) DEVICE — CLIP, ENDO APPLIER LIGAMAX 5MM

## (undated) DEVICE — CARE KIT, LAPAROSCOPIC, ADVANCED

## (undated) DEVICE — APPLICATOR, CHLORAPREP, W/ORANGE TINT, 26ML

## (undated) DEVICE — HOOD, SURGICAL, FLYTE HYBRID

## (undated) DEVICE — NEEDLE, INSUFFLATION, 13GAX120MM, DISP

## (undated) DEVICE — MAYO TRAY, LARGE

## (undated) DEVICE — SUTURE, MONOCRYL, 4-0, 18 IN, PS2, UNDYED

## (undated) DEVICE — SUTURE, PDS II, 0, 27 IN, CT-2, VIOLET

## (undated) DEVICE — NEEDLE, SPINAL, QUINCKE, 18 G X 3.5 IN, PINK HUB

## (undated) DEVICE — CAUTERY, PENCIL, PUSH BUTTON, SMOKE EVAC, 70MM

## (undated) DEVICE — STAPLER,  LINEAR RELOAD, 60MM, WHITE, DISP

## (undated) DEVICE — ACCESS SYS, KII SHIELDED BLADED, Z-THREAD, 12X150CM

## (undated) DEVICE — HIGH FLOW TIP FOR INTERPULSE HANDPIECE SET

## (undated) DEVICE — DEVICE, SUTURE, ENDOSTITCH, 10 MM

## (undated) DEVICE — DRESSING, MEPILEX BORDER, POST-OP AG, 4 X 10 IN

## (undated) DEVICE — STAPLER,  LINEAR ENDO 60MM RELOAD, GREEN, DISP

## (undated) DEVICE — DRAPE, TIBURON, SPLIT SHEET, REINF ADH STRIP, 77X122

## (undated) DEVICE — STAPLER, ECHELON FLEX GST, POWERED PLUS, 60MM X 44CM, STANDARD

## (undated) DEVICE — ADHESIVE, SKIN, DERMABOND ADVANCED, 15CM, PEN-STYLE

## (undated) DEVICE — TIP,  ELECTRODE COATED INSULATED, EXTENDED, LF

## (undated) DEVICE — SUTURE, MONOCRYL, 2-0, 36 IN, CT-1, UNDYED

## (undated) DEVICE — STAPLER, SKIN PROXIMATE, 35 WIDE

## (undated) DEVICE — TISSUE ADHESIVE, PREMIERPRO EXOFIN, PRECISION PEN HV, 1.0ML

## (undated) DEVICE — SLEEVE, KII, Z-THREAD, 5X100CM

## (undated) DEVICE — SYRINGE, 35 CC, LUER LOCK, MONOJECT, LF

## (undated) DEVICE — RELOAD, ENDOSTITCH, POLYSORB, 2-0, 48 IN, ES9, VIOLET, SULU

## (undated) DEVICE — NEEDLE, EPIDURAL, TUOHY, 18 G X 3.5 IN

## (undated) DEVICE — BLADE, SAW, RECIPROCATING, DOUBLE-SIDED, 70 X 12.5 X 1 MM, STAINLESS STEEL

## (undated) DEVICE — SPONGE, LAP, XRAY DECT, 18IN X 18IN, W/LOOP, STERILE

## (undated) DEVICE — BANDAGE, GAUZE, CONFORMING, KERLIX, 6 PLY, 4.5 IN X 4.1 YD

## (undated) DEVICE — ASSEMBLY, STRYKER FLOW 2, SUCTION IRRIGATOR, WITH TIP

## (undated) DEVICE — SYSTEM, ORTHOPEDIC SUCTION